# Patient Record
Sex: MALE | Race: WHITE | Employment: FULL TIME | URBAN - METROPOLITAN AREA
[De-identification: names, ages, dates, MRNs, and addresses within clinical notes are randomized per-mention and may not be internally consistent; named-entity substitution may affect disease eponyms.]

---

## 2020-10-07 ENCOUNTER — TELEPHONE (OUTPATIENT)
Dept: FAMILY MEDICINE CLINIC | Age: 45
End: 2020-10-07

## 2020-10-07 ENCOUNTER — OFFICE VISIT (OUTPATIENT)
Dept: FAMILY MEDICINE CLINIC | Age: 45
End: 2020-10-07
Payer: OTHER GOVERNMENT

## 2020-10-07 VITALS
WEIGHT: 163 LBS | DIASTOLIC BLOOD PRESSURE: 94 MMHG | SYSTOLIC BLOOD PRESSURE: 138 MMHG | HEART RATE: 76 BPM | TEMPERATURE: 97.5 F | BODY MASS INDEX: 24.71 KG/M2 | OXYGEN SATURATION: 99 % | HEIGHT: 68 IN

## 2020-10-07 PROBLEM — F41.9 ANXIETY: Status: ACTIVE | Noted: 2020-10-07

## 2020-10-07 PROBLEM — F90.9 ATTENTION DEFICIT HYPERACTIVITY DISORDER (ADHD): Status: ACTIVE | Noted: 2020-10-07

## 2020-10-07 PROBLEM — F43.10 PTSD (POST-TRAUMATIC STRESS DISORDER): Status: ACTIVE | Noted: 2020-10-07

## 2020-10-07 PROCEDURE — 90471 IMMUNIZATION ADMIN: CPT | Performed by: NURSE PRACTITIONER

## 2020-10-07 PROCEDURE — 99203 OFFICE O/P NEW LOW 30 MIN: CPT | Performed by: NURSE PRACTITIONER

## 2020-10-07 PROCEDURE — 90686 IIV4 VACC NO PRSV 0.5 ML IM: CPT | Performed by: NURSE PRACTITIONER

## 2020-10-07 RX ORDER — BUPROPION HYDROCHLORIDE 150 MG/1
150 TABLET ORAL EVERY MORNING
Qty: 30 TABLET | Refills: 5 | Status: SHIPPED | OUTPATIENT
Start: 2020-10-07 | End: 2020-12-10

## 2020-10-07 ASSESSMENT — PATIENT HEALTH QUESTIONNAIRE - PHQ9
1. LITTLE INTEREST OR PLEASURE IN DOING THINGS: 0
SUM OF ALL RESPONSES TO PHQ QUESTIONS 1-9: 2
SUM OF ALL RESPONSES TO PHQ9 QUESTIONS 1 & 2: 2
SUM OF ALL RESPONSES TO PHQ QUESTIONS 1-9: 2
2. FEELING DOWN, DEPRESSED OR HOPELESS: 2

## 2020-10-07 ASSESSMENT — ENCOUNTER SYMPTOMS
STRIDOR: 0
COUGH: 0
SHORTNESS OF BREATH: 0
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
## 2020-10-07 NOTE — TELEPHONE ENCOUNTER
Patient states his referral to psychiatry needs to go through [facility]. Patient had his  ID only with him today. His member Id is on the back of card.

## 2020-10-07 NOTE — PATIENT INSTRUCTIONS
Start Wellbutrin daily   Make appointment with [facility]  Follow up in 8 weeks   Can schedule physical for fasting blood work  BP mildly elevated monitor salt intake    Patient Education   bupropion  Pronunciation:  byoo PRO pee on  Brand:  Aplenzin, Forfivo XL, Wellbutrin SR, Wellbutrin XL, Zyban Advantage Pack  What is the most important information I should know about bupropion? You should not take bupropion if you have seizures or an eating disorder, or if you have suddenly stopped using alcohol, seizure medication, or sedatives. If you take Wellbutrin for depression, do not also take Zyban to quit smoking. Do not use bupropion within 14 days before or 14 days after you have used an MAO inhibitor, such as isocarboxazid, linezolid, methylene blue injection, phenelzine, rasagiline, selegiline, or tranylcypromine. Some young people have thoughts about suicide when first taking an antidepressant. Stay alert to changes in your mood or symptoms. Report any new or worsening symptoms to your doctor. What is bupropion? Bupropion is an antidepressant used to treat major depressive disorder and seasonal affective disorder. The Zyban brand of bupropion is used to help people stop smoking by reducing cravings and other withdrawal effects. Bupropion may also be used for purposes not listed in this medication guide. What should I discuss with my healthcare provider before taking bupropion? You should not take bupropion if you are allergic to it, or if you have:  · a seizure disorder;  · an eating disorder such as anorexia or bulimia; or  · if you have suddenly stopped using alcohol, seizure medication, or a sedative (such as Xanax, Valium, Fiorinal, Klonopin, and others). Do not use an MAO inhibitor within 14 days before or 14 days after you take bupropion. A dangerous drug interaction could occur. MAO inhibitors include isocarboxazid, linezolid, phenelzine, rasagiline, selegiline, and tranylcypromine.   Do not take bupropion to treat more than one condition at a time. If you take bupropion for depression, do not also take this medicine to quit smoking. Bupropion may cause seizures, especially if you have certain medical conditions or use certain drugs. Tell your doctor about all of your medical conditions and the drugs you use. Tell your doctor if you have ever had:  · a head injury, seizures, or brain or spinal cord tumor;  · narrow-angle glaucoma;  · heart disease, high blood pressure, or a heart attack;  · diabetes;  · kidney or liver disease (especially cirrhosis);  · depression, bipolar disorder, or other mental illness; or  · if you drink alcohol. Some young people have thoughts about suicide when first taking an antidepressant. Your doctor will need to check your progress at regular visits. Your family or other caregivers should also be alert to changes in your mood or symptoms. Ask your doctor about taking this medicine if you are pregnant. It is not known whether bupropion will harm an unborn baby. However, you may have a relapse of depression if you stop taking your antidepressant. Tell your doctor right away if you become pregnant. Do not start or stop taking bupropion without your doctor's advice. If you are pregnant, your name may be listed on a pregnancy registry to track the effects of bupropion on the baby. It may not be safe to breastfeed while using this medicine. Ask your doctor about any risk. Bupropion is not approved for use by anyone younger than 25years old. How should I take bupropion? Follow all directions on your prescription label and read all medication guides or instruction sheets. Your doctor may occasionally change your dose. Use the medicine exactly as directed. Too much of this medicine can increase your risk of a seizure. You may take bupropion with or without food. Swallow the extended-release tablet whole and do not crush, chew, or break it.   You should not change your dose or stop using bupropion suddenly, unless you have a seizure while taking this medicine. Stopping suddenly can cause unpleasant withdrawal symptoms. Ask your doctor how to safely stop using bupropion. If you take Zyban to help you stop smoking, you may continue to smoke for about 1 week after you start the medicine. Set a date to quit smoking during the first 2 weeks of treatment. Talk to your doctor if you have trouble quitting after taking Zyban for 7 to 12 weeks. Your doctor may prescribe a nicotine replacement product (such as patches or gum) to help you stop smoking. Start using the nicotine replacement product on the same day you stop (quit) smoking or using tobacco products. Some people taking bupropion (Wellbutrin or Zyban) have had high blood pressure that is severe, especially when also using a nicotine replacement product (patch or gum). Your blood pressure may need to be checked before and during treatment with bupropion. Read and carefully follow any Instructions for Use provided with your medicine. Ask your doctor or pharmacist if you do not understand these instructions. You may have nicotine withdrawal symptoms when you stop smoking, including: increased appetite, weight gain, trouble sleeping, trouble concentrating, slower heart rate, having the urge to smoke, and feeling anxious, restless, depressed, angry, frustrated, or irritated. These symptoms may occur with or without using medication such as Zyban. Smoking cessation may also cause new or worsening mental health problems, such as depression. This medicine may affect a drug-screening urine test and you may have false results. Tell the laboratory staff that you use bupropion. Store at room temperature away from moisture, heat, and light. What happens if I miss a dose? Skip the missed dose and use your next dose at the regular time. Do not use two doses at one time. What happens if I overdose?   Seek emergency medical attention or call the Poison Help line at 1-306.417.9927. An overdose of bupropion can be fatal.  Overdose symptoms may include muscle stiffness, hallucinations, fast or uneven heartbeat, shallow breathing, or fainting. What should I avoid while taking bupropion? Drinking alcohol with bupropion may increase your risk of seizures. If you drink alcohol regularly, talk with your doctor before changing the amount you drink. Bupropion can also cause seizures in a regular drinker who suddenly stops drinking at the start of treatment with bupropion. Avoid driving or hazardous activity until you know how this medicine will affect you. Your reactions could be impaired. What are the possible side effects of bupropion? Get emergency medical help if you have signs of an allergic reaction (hives, itching, fever, swollen glands, difficult breathing, swelling in your face or throat) or a severe skin reaction (fever, sore throat, burning eyes, skin pain, red or purple skin rash with blistering and peeling). Report any new or worsening symptoms to your doctor, such as: mood or behavior changes, anxiety, depression, panic attacks, trouble sleeping, or if you feel impulsive, irritable, agitated, hostile, aggressive, restless, hyperactive (mentally or physically), more depressed, or have thoughts about suicide or hurting yourself. Call your doctor at once if you have:  · a seizure (convulsions);  · confusion, unusual changes in mood or behavior;  · blurred vision, tunnel vision, eye pain or swelling, or seeing halos around lights;  · fast or irregular heartbeats; or  · a manic episode --racing thoughts, increased energy, reckless behavior, feeling extremely happy or irritable, talking more than usual, severe problems with sleep.   Common side effects may include:  · dry mouth, sore throat, stuffy nose;  · ringing in the ears;  · blurred vision;  · nausea, vomiting, stomach pain, loss of appetite, constipation;  · sleep problems (insomnia);  · tremors, sweating, feeling anxious or nervous;  · fast heartbeats;  · confusion, agitation, hostility;  · rash;  · weight loss;  · increased urination;  · headache, dizziness; or  · muscle or joint pain. This is not a complete list of side effects and others may occur. Call your doctor for medical advice about side effects. You may report side effects to FDA at 1-988-GSI-7520. What other drugs will affect bupropion? You may have a higher risk of seizures if you use certain other medicines while taking bupropion. Many drugs can affect bupropion. This includes prescription and over-the-counter medicines, vitamins, and herbal products. Not all possible interactions are listed here. Tell your doctor about all your current medicines and any medicine you start or stop using. Where can I get more information? Your pharmacist can provide more information about bupropion. Remember, keep this and all other medicines out of the reach of children, never share your medicines with others, and use this medication only for the indication prescribed. Every effort has been made to ensure that the information provided by [address] is accurate, up-to-date, and complete, but no guarantee is made to that effect. Drug information contained herein may be time sensitive. [facility] information has been compiled for use by healthcare practitioners and consumers in the [place] and therefore [employer] does not warrant that uses outside of the [place] are appropriate, unless specifically indicated otherwise. [facility]'s drug information does not endorse drugs, diagnose patients or recommend therapy.  [facility] drug information is an informational resource designed to assist licensed healthcare practitioners in caring for their patients and/or to serve consumers viewing this service as a supplement to, and not a substitute for, the expertise, skill, knowledge and judgment of healthcare practitioners. The absence of a warning for a given drug or drug combination in no way should be construed to indicate that the drug or drug combination is safe, effective or appropriate for any given patient. [employer] does not assume any responsibility for any aspect of healthcare administered with the aid of information [employer] provides. The information contained herein is not intended to cover all possible uses, directions, precautions, warnings, drug interactions, allergic reactions, or adverse effects. If you have questions about the drugs you are taking, check with your doctor, nurse or pharmacist.  Copyright 6892-0234 [address]: 24.01. Revision date: 1/27/2020. Care instructions adapted under license by [facility]). If you have questions about a medical condition or this instruction, always ask your healthcare professional. [place] [address] any warranty or liability for your use of this information. Patient Education   DASH Diet: Care Instructions  Your Care Instructions     The DASH diet is an eating plan that can help lower your blood pressure. DASH stands for Dietary Approaches to Stop Hypertension. Hypertension is high blood pressure. The DASH diet focuses on eating foods that are high in calcium, potassium, and magnesium. These nutrients can lower blood pressure. The foods that are highest in these nutrients are fruits, vegetables, low-fat dairy products, nuts, seeds, and legumes. But taking calcium, potassium, and magnesium supplements instead of eating foods that are high in those nutrients does not have the same effect. The DASH diet also includes whole grains, fish, and poultry. The DASH diet is one of several lifestyle changes your doctor may recommend to lower your high blood pressure. Your doctor may also want you to decrease the amount of sodium in your diet.  Lowering sodium while following the DASH diet can lower blood pressure even further than just the DASH diet alone. Follow-up care is a key part of your treatment and safety. Be sure to make and go to all appointments, and call your doctor if you are having problems. It's also a good idea to know your test results and keep a list of the medicines you take. How can you care for yourself at home? Following the DASH diet  · Eat 4 to 5 servings of fruit each day. A serving is 1 medium-sized piece of fruit, ½ cup chopped or canned fruit, 1/4 cup dried fruit, or 4 ounces (½ cup) of fruit juice. Choose fruit more often than fruit juice. · Eat 4 to 5 servings of vegetables each day. A serving is 1 cup of lettuce or raw leafy vegetables, ½ cup of chopped or cooked vegetables, or 4 ounces (½ cup) of vegetable juice. Choose vegetables more often than vegetable juice. · Get 2 to 3 servings of low-fat and fat-free dairy each day. A serving is 8 ounces of milk, 1 cup of yogurt, or 1 ½ ounces of cheese. · Eat 6 to 8 servings of grains each day. A serving is 1 slice of bread, 1 ounce of dry cereal, or ½ cup of cooked rice, pasta, or cooked cereal. Try to choose whole-grain products as much as possible. · Limit lean meat, poultry, and fish to 2 servings each day. A serving is 3 ounces, about the size of a deck of cards. · Eat 4 to 5 servings of nuts, seeds, and legumes (cooked dried beans, lentils, and split peas) each week. A serving is 1/3 cup of nuts, 2 tablespoons of seeds, or ½ cup of cooked beans or peas. · Limit fats and oils to 2 to 3 servings each day. A serving is 1 teaspoon of vegetable oil or 2 tablespoons of salad dressing. · Limit sweets and added sugars to 5 servings or less a week. A serving is 1 tablespoon jelly or jam, ½ cup sorbet, or 1 cup of lemonade. · Eat less than 2,300 milligrams (mg) of sodium a day. If you limit your sodium to 1,500 mg a day, you can lower your blood pressure even more. Tips for success  · Start small. Do not try to make dramatic changes to your diet all at once.  You

## 2020-10-07 NOTE — PROGRESS NOTES
Vaccine Information Sheet, \"Influenza - Inactivated\"  given to Kameron Joyce, or parent/legal guardian of  Kameron Joyce and verbalized understanding. Patient responses:    Have you ever had a reaction to a flu vaccine? No  Do you have any current illness? No  Have you ever had Guillian Delanson Syndrome? No  Do you have a serious allergy to any of the follow: Neomycin, Polymyxin, Thimerosal, eggs or egg products? No    Flu vaccine given per order. Please see immunization tab. Risks and benefits explained. Current VIS given.

## 2020-10-14 NOTE — TELEPHONE ENCOUNTER
The referral was approved. The pt was called and the referral information was sent to the p.  The referral and the the documentation was sent to the spesicalist

## 2020-12-09 ENCOUNTER — TELEPHONE (OUTPATIENT)
Dept: FAMILY MEDICINE CLINIC | Age: 45
End: 2020-12-09

## 2020-12-09 NOTE — TELEPHONE ENCOUNTER
----- Message from [facility] sent at 12/9/2020  4:15 PM EST -----  Subject: Appointment Request    Reason for Call: Routine Existing Condition Follow Up    QUESTIONS  Type of Appointment? Established Patient  Reason for appointment request? No appointments available during search  Additional Information for Provider? Patient is requesting a virtual apt   since he has tested positive for COVID on Dec 6 and has cancelled his in   person apt on 12/10 with Carlos Maurice. Please call [phone].  ---------------------------------------------------------------------------  --------------  Darl Boom INFO  What is the best way for the office to contact you? OK to leave message on   voicemail  Preferred Call Back Phone Number? [phone]  ---------------------------------------------------------------------------  --------------  SCRIPT ANSWERS  Relationship to Patient? Self  Appointment reason? Well Care/Follow Ups  Select a Well Care/Follow Ups appointment reason? Adult Existing Condition   Follow Up [Diabetes   CHF   COPD   Hypertension/Blood Pressure Check]  (Is the patient requesting to be seen urgently for their symptoms?)? No  Is this follow up request related to routine Diabetes Management? No  Are you having any new concerns about your existing condition? No  Have you been diagnosed with   tested for   or told that you are suspected of having COVID-19 (Coronavirus)? Yes  Did your symptoms begin or have you been tested for COVID-19 in the last   10 days?  Yes

## 2020-12-10 ENCOUNTER — VIRTUAL VISIT (OUTPATIENT)
Dept: FAMILY MEDICINE CLINIC | Age: 45
End: 2020-12-10
Payer: OTHER GOVERNMENT

## 2020-12-10 PROCEDURE — 99213 OFFICE O/P EST LOW 20 MIN: CPT | Performed by: NURSE PRACTITIONER

## 2020-12-10 ASSESSMENT — ENCOUNTER SYMPTOMS
COUGH: 1
STRIDOR: 0
SHORTNESS OF BREATH: 0
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
## 2021-03-16 ENCOUNTER — TELEPHONE (OUTPATIENT)
Dept: FAMILY MEDICINE CLINIC | Age: 46
End: 2021-03-16

## 2021-03-16 NOTE — TELEPHONE ENCOUNTER
----- Message from Gaudencio White sent at 3/15/2021  4:18 PM EDT -----  Subject: Appointment Request    Reason for Call: New Patient Request Appointment    QUESTIONS  Type of Appointment? New Patient/New to Provider  Reason for appointment request? Requested Provider unavailable - Alexys Rodriguez  Additional Information for Provider? Pt's Gf   Cassie Hahn   called in to establish care for PT with provider Maria Isabel Sanchez. Provider   told Ms. Best that she would take him as a new PT. Screened green. Please   call him back to schedule.  ---------------------------------------------------------------------------  --------------  CALL BACK INFO  What is the best way for the office to contact you? OK to leave message on   voicemail  Preferred Call Back Phone Number? [phone]  ---------------------------------------------------------------------------  --------------  SCRIPT ANSWERS  Relationship to Patient? Other  Representative Name? Cassie Hahn  Additional information verified (besides Name and Date of Birth)? Address  Appointment reason? Establish Care/Find a provider  Is this the first appointment to establish care for a ? No  Have you been diagnosed with   tested for   or told that you are suspected of having COVID-19 (Coronavirus)? Yes  Did your symptoms begin or have you been tested for COVID-19 in the last   10 days? No  Have you had a fever or taken medication to treat a fever within the past   3 days? No  Have you had a cough   shortness of breath or flu-like symptoms within the past 3 days? No  Do you currently have flu-like symptoms including fever or chills   cough   shortness of breath   or difficulty breathing   or new loss of taste or smell? No  (Service Expert  click yes below to proceed with RAP Index As Usual   Scheduling)?  Yes

## 2021-03-24 NOTE — TELEPHONE ENCOUNTER
Pt was scheduled using a different chart. [facility] made this chart when patient already had a chart. [facility] also put in the wrong birthday for pt in this chart.  His other chart is   Layla Reyes : [birth year]

## 2021-03-29 ENCOUNTER — OFFICE VISIT (OUTPATIENT)
Dept: FAMILY MEDICINE CLINIC | Age: 46
End: 2021-03-29
Payer: OTHER GOVERNMENT

## 2021-03-29 VITALS
WEIGHT: 167 LBS | BODY MASS INDEX: 25.31 KG/M2 | TEMPERATURE: 97.3 F | HEIGHT: 68 IN | OXYGEN SATURATION: 98 % | SYSTOLIC BLOOD PRESSURE: 130 MMHG | DIASTOLIC BLOOD PRESSURE: 86 MMHG | HEART RATE: 98 BPM

## 2021-03-29 DIAGNOSIS — R41.840 CONCENTRATION DEFICIT: Primary | ICD-10-CM

## 2021-03-29 PROCEDURE — 99213 OFFICE O/P EST LOW 20 MIN: CPT | Performed by: NURSE PRACTITIONER

## 2021-03-29 ASSESSMENT — ENCOUNTER SYMPTOMS
SHORTNESS OF BREATH: 0
WHEEZING: 0

## 2021-03-29 NOTE — PROGRESS NOTES
Patient: Araceli Olmos is a 55 y.o. male who presents today with the following Chief Complaint(s):  Chief Complaint   Patient presents with    Establish Care     discuss add, [facility] and [facility] since covid started          HPI   Here to establish care  Wants to discuss attention deficit- was going to be tested at the [facility] but then COVID happened and they weren't seeing anyone except emergencies. Was seeing Jordan Christopher and tried wellbutrin but it did not help him focus at all. Did not like how he felt on it. He states his daughter is getting tested [facility]  For ADD/ADHD  Retired from [employer] last year     No other c/o    No current outpatient medications on file. No current facility-administered medications for this visit. Patient's past medical history, surgical history, family history, medications,  andallergies  were all reviewed and updated as appropriate today. Review of Systems   Constitutional: Negative for chills and fever. Respiratory: Negative for shortness of breath and wheezing. Cardiovascular: Negative for chest pain and palpitations. Neurological: Negative for dizziness and headaches. Physical Exam  Vitals signs and nursing note reviewed. Constitutional:       Appearance: Normal appearance. He is well-developed. HENT:      Head: Normocephalic and atraumatic. Right Ear: External ear normal.      Left Ear: External ear normal.      Nose: Nose normal.      Mouth/Throat:      Pharynx: No oropharyngeal exudate or posterior oropharyngeal erythema. Eyes:      Conjunctiva/sclera: Conjunctivae normal.   Neck:      Musculoskeletal: Normal range of motion. Cardiovascular:      Rate and Rhythm: Normal rate and regular rhythm. Heart sounds: Normal heart sounds. No murmur. Pulmonary:      Effort: Pulmonary effort is normal. No respiratory distress. Breath sounds: Normal breath sounds. No wheezing or rales.    Musculoskeletal: Normal range of motion. Lymphadenopathy:      Cervical: No cervical adenopathy. Skin:     General: Skin is warm and dry. Neurological:      General: No focal deficit present. Mental Status: He is alert and oriented to person, place, and time. Deep Tendon Reflexes: Reflexes are normal and symmetric. Psychiatric:         Mood and Affect: Mood normal.         Behavior: Behavior normal.         Thought Content: Thought content normal.         Judgment: Judgment normal.       Vitals:    03/29/21 1732   BP: 130/86   Pulse:    Temp:    SpO2:        Assessment:  Encounter Diagnosis   Name Primary?  Concentration deficit Yes       Plan:  1. Concentration deficit  Will make appt with Ilsa for eval of ADD/ADHD          No follow-ups on file.

## 2021-04-20 ENCOUNTER — VIRTUAL VISIT (OUTPATIENT)
Dept: PSYCHOLOGY | Age: 46
End: 2021-04-20
Payer: OTHER GOVERNMENT

## 2021-04-20 DIAGNOSIS — R41.840 POOR CONCENTRATION: Primary | ICD-10-CM

## 2021-04-20 PROCEDURE — 90791 PSYCH DIAGNOSTIC EVALUATION: CPT | Performed by: PSYCHOLOGIST

## 2021-04-20 NOTE — PROGRESS NOTES
Behavioral Health Consultation  Jatin Muro M.A. Psychology Assistant  Kristofer Medrano, Ph.D. Supervising Psychologist  4/20/2021  3:08 PM EDT      Time spent with Patient: 25 minutes  [facility]. Reason for Consult:    Chief Complaint   Patient presents with   [facility] Other     ADHD symptoms     Referring Provider: Brendon Quintero, APRN - CNP  [address] [facility],  [address] [place] [address]    Pt provided informed consent for the behavioral health program. Discussed with patient model of service to include the limits of confidentiality (i.e. abuse reporting, suicide intervention, etc.) and short-term intervention focused approach. Pt indicated understanding. Feedback given to PCP. TELEHEALTH VISIT -- Audio/Visual (During Critical access hospitalYV-23 public health emergency)  }  Pursuant to the emergency declaration under the [address] [place] [address], [facility] waiver authority and the [employer] Act, this Virtual Visit was conducted, with patient's consent, to reduce the patient's risk of exposure to COVID-19 and provide continuity of care for an established patient. Services were provided through a video synchronous discussion virtually to substitute for in-person clinic visit. Pt gave verbal informed consent to participate in telehealth services. Conducted a risk-benefit analysis and determined that the patient's presenting problems are consistent with the use of telepsychology. Determined that the patient has sufficient knowledge and skills in the use of technology enabling them to adequately benefit from telepsychology. It was determined that this patient was able to be properly treated without an in-person session. Patient verified that they were currently located at the [facility] address that was provided during registration.     Verified the following information:  Patient's identification: Yes  [facility]  Patient's call back number: [phone]   Patient's emergency contact's name and number, as well as permission to contact them if needed: Extended Emergency Contact Information  Primary Emergency Contact: Korey Roach  Home Phone: [phone]  Relation: Domestic Partner   needed? No  Secondary Emergency Contact: alicia resendiz  Home Phone: [phone]  Relation: Other  Preferred language: English     Provider location: [facility]:  Patient presents for ADHD evaluation. Has trouble finishing projects (e.g., basement). Pt reported fidgeting and interruptting people a lot. Symptoms have been going on for most of my life. Goals for treatment incude having better quality of life. Patient lives with partner and up to 5 children. Patient works as an . Daily routine is inconsistent. Daily caffeine use consists of one medium [employer] coffee. No cigarette use. Pt has one glass of wine twice a month. No illegal drug use. Patient spends little time per day on social media or watching TV. Pt exercises 4 days per week for 20-40 min. Patient described having poor sleep quality (has been difficult for 15 years). Avg. Amount of sleep per night is 5 hours (could be broken up too). Racing thoughts in bed. Patient enjoys the following hobbies: running. Patient describes good social support. Pt does not identify with any spiritual or Methodist beliefs (reported that he tries to be kind). Family history is positive for ADHD.     O:  MSE:    Appearance: good hygiene   Attitude: cooperative and friendly  Consciousness: alert  Orientation: oriented to person, place, time, general circumstance  Memory: recent and remote memory intact  Attention/Concentration: intact during session  Psychomotor Activity:normal  Eye Contact: normal  Speech: normal rate and volume, well-articulated  Mood: neutral  Affect: congruent  Perception: within normal limits  Thought Content: within Family History:   Family History   Problem Relation Age of Onset    High Blood Pressure Mother     High Cholesterol Mother     Breast Cancer Mother     Alcohol Abuse Mother     High Blood Pressure Father     High Cholesterol Father     Cancer Paternal Aunt     Heart Attack Paternal Uncle     Cancer Maternal Grandmother         breast     Heart Attack Maternal Grandfather     Cancer Paternal Grandmother         \"female region\"    Heart Attack Paternal Grandfather        A:    Pt is being assessed for ADHD sx. Pt denied SI and harm ideation. Diagnosis:    1. Poor concentration      No past medical history on file.   Plan:  Pt interventions:  Established rapport, Conducted functional assessment, [place]-setting to identify pt's primary goals for [facility] visit / overall health and Supportive techniques    Pt Behavioral Change Plan:   See Pt Instructions

## 2021-05-07 ENCOUNTER — VIRTUAL VISIT (OUTPATIENT)
Dept: PSYCHOLOGY | Age: 46
End: 2021-05-07
Payer: OTHER GOVERNMENT

## 2021-05-07 DIAGNOSIS — R41.840 POOR CONCENTRATION: Primary | ICD-10-CM

## 2021-05-07 PROCEDURE — 90832 PSYTX W PT 30 MINUTES: CPT | Performed by: PSYCHOLOGIST

## 2021-05-07 NOTE — PROGRESS NOTES
Relation: Other  Preferred language: English     Provider location: [facility]:    Pt reported that as a child he got in trouble in school for not staying seated, not finishing assignments, and being dissruptive. Pt reported that he received Bs and Cs before college. Pt reported getting Ds and Fs in college. Pt did eventually earn two bachelor's degrees and an SAMSON. Pt reported the following inattentive ADHD sx: Overlooking details (since childhood), appearing not to listen (since childhood), task inattention, and difficulty organizing tasks. Pt reported the following hyperactivity/impulsivity sx: Fidgeting (present in childhood), leaving seats (present since childhood, pt has averaged 30k steps per day, 900k steps per month), running or climbing when inappropriate (present since childhood), unable to remain quiet (since childhood), talking excessively (present since childhood), and interrupting people (as a child). Pt reported that he is interested in both medication and behavior medicine interventions. Pt reported that he was previously on Wellbutrin (did not help). O:  MSE:    Appearance: good hygiene   Attitude: cooperative and friendly  Consciousness: alert  Orientation: oriented to person, place, time, general circumstance  Memory: recent and remote memory intact  Attention/Concentration: variable  Psychomotor Activity:normal  Eye Contact: normal  Speech: normal rate and volume, well-articulated  Mood: neutral  Affect: congruent  Perception: within normal limits  Thought Content: within normal limits  Thought Process: logical, coherent and goal-directed  Insight: fair  Judgment: intact  Ability to understand instructions: Yes  Ability to respond meaningfully: Yes  Morbid Ideation: no   Suicide Assessment: no suicidal ideation, plan, or intent  Homicidal Ideation: no    History:    Medications:   No current outpatient medications on file.      No current facility-administered medications for this visit. Social History:   Social History     Socioeconomic History    Marital status: Single     Spouse name: Not on file    Number of children: Not on file    Years of education: Not on file    Highest education level: Master's degree (e.g., MA, MS, Alan, MEd, MSW, SAMSON)   Occupational History    Not on file   Social Needs    Financial resource strain: Not on file    Food insecurity     Worry: Not on file     Inability: Not on file   [employer] needs     Medical: Not on file     Non-medical: Not on file   Tobacco Use    Smoking status: Never Smoker    Smokeless tobacco: Never Used   Substance and Sexual Activity    Alcohol use: Not Currently    Drug use: Never    Sexual activity: Yes   Lifestyle    Physical activity     Days per week: Not on file     Minutes per session: Not on file    Stress: Not on file   Relationships    Social connections     Talks on phone: Not on file     Gets together: Not on file     Attends Jainism service: Not on file     Active member of club or organization: Not on file     Attends meetings of clubs or organizations: Not on file     Relationship status: Not on file    Intimate partner violence     Fear of current or ex partner: Not on file     Emotionally abused: Not on file     Physically abused: Not on file     Forced sexual activity: Not on file   Other Topics Concern    Not on file   Social History Narrative    Not on file     TOBACCO:   reports that he has never smoked. He has never used smokeless tobacco.  ETOH:   reports previous alcohol use.   Family History:   Family History   Problem Relation Age of Onset    High Blood Pressure Mother     High Cholesterol Mother     Breast Cancer Mother     Alcohol Abuse Mother     High Blood Pressure Father     High Cholesterol Father     Cancer Paternal Aunt     Heart Attack Paternal Uncle     Cancer Maternal Grandmother         breast     Heart Attack Maternal Grandfather     Cancer Paternal Grandmother         \"female region\"    Heart Attack Paternal Grandfather        A:    Pt likely has ADHD but further assessment is needed to determine if it is the hyperactivity/impulsive type or combined type. Diagnosis:    1. Poor concentration      No past medical history on file.   Plan:  Pt interventions:  Conducted functional assessment    Pt Behavioral Change Plan:   See Pt Instructions

## 2021-05-21 ENCOUNTER — VIRTUAL VISIT (OUTPATIENT)
Dept: PSYCHOLOGY | Age: 46
End: 2021-05-21

## 2021-05-21 DIAGNOSIS — F90.2 ATTENTION DEFICIT HYPERACTIVITY DISORDER (ADHD), COMBINED TYPE: Primary | ICD-10-CM

## 2021-05-21 PROCEDURE — 99999 PR OFFICE/OUTPT VISIT,PROCEDURE ONLY: CPT | Performed by: PSYCHOLOGIST

## 2021-05-21 NOTE — PROGRESS NOTES
Behavioral Health Consultation  Ramirez Liao M.A. Psychology Assistant  Gucci Gannon, Ph.D. Supervising Psychologist  5/21/2021  11:45 AM EDT      Time spent with Patient: 15 minutes  This is patient's third [facility] appointment. Reason for Consult:    Chief Complaint   Patient presents with    Other     ADHD symptoms     Feedback given to PCP. TELEHEALTH VISIT -- Audio/Visual (During QBGUQ-40 public health emergency)  }  Pursuant to the emergency declaration under the [address] [place] [address] waiver authority and the [employer] Act, this Virtual Visit was conducted, with patient's consent, to reduce the patient's risk of exposure to COVID-19 and provide continuity of care for an established patient. Services were provided through a video synchronous discussion virtually to substitute for in-person clinic visit. Pt gave verbal informed consent to participate in telehealth services. Conducted a risk-benefit analysis and determined that the patient's presenting problems are consistent with the use of telepsychology. Determined that the patient has sufficient knowledge and skills in the use of technology enabling them to adequately benefit from telepsychology. It was determined that this patient was able to be properly treated without an in-person session. Patient verified that they were currently located at the [facility] address that was provided during registration. Verified the following information:  Patient's identification: Yes  Patient location: [address] [place] [id number]   Patient's call back number: [phone]   Patient's emergency contact's name and number, as well as permission to contact them if needed: Extended Emergency Contact Information  Primary Emergency Contact: Alicia Rowell  Home Phone: [phone]  Relation: Domestic Partner   needed?  No  Secondary Emergency Contact: alicia resendiz  Home Phone: [phone]  Relation: Other  Preferred language: English     [facility] [address] [facility]     S:    Pt completed the rest of the diagnostic interview for ADHD. In addition to sx previously reported (see previous notes), pt reported the following Inattentions Sx: Fails to finish tasks, Often loses things necessary for tasks, Easily distracted, and Often forgetful. In addition to sx previously reported (see previous notes), pt reported the following Hyperactivity/impulsivity: Hyperactivity, Blurts answers, and Struggles to take turns. [facility] shared diagnosis with pt. Pt reported that it felt good to know about the diagnosis. Pt was referred to PCP for medication, and to Dr. Vernell Hopkins [facility] for behavioral health interventions. O:  MSE:    Appearance: good hygiene   Attitude: cooperative and friendly  Consciousness: alert  Orientation: oriented to person, place, time, general circumstance  Memory: recent and remote memory intact  Attention/Concentration: intact during session  Psychomotor Activity:normal  Eye Contact: normal  Speech: normal rate and volume, well-articulated  Mood: neutral  Affect: congruent  Perception: within normal limits  Thought Content: within normal limits  Thought Process: logical, coherent and goal-directed  Insight: good  Judgment: intact  Ability to understand instructions: Yes  Ability to respond meaningfully: Yes  Morbid Ideation: no   Suicide Assessment: no suicidal ideation, plan, or intent  Homicidal Ideation: no    History:    Medications:   No current outpatient medications on file. No current facility-administered medications for this visit.      Social History:   Social History     Socioeconomic History    Marital status: Single     Spouse name: Not on file    Number of children: Not on file    Years of education: Not on file    Highest education level: Master's degree (e.g., MA, MS, Alan, MEd, MSW, SAMSON)   Occupational History    Not on file Tobacco Use    Smoking status: Never Smoker    Smokeless tobacco: Never Used   Vaping Use    Vaping Use: Never used   Substance and Sexual Activity    Alcohol use: Not Currently    Drug use: Never    Sexual activity: Yes   Other Topics Concern    Not on file   Social History Narrative    Not on file     Social Determinants of Health     Financial Resource Strain:     Difficulty of Paying Living Expenses:    Food Insecurity:     Worried About Running Out of Food in the Last Year:     [address] in the Last Year:    Transportation Needs:     Lack of Transportation (Medical):  Lack of Transportation (Non-Medical):    Physical Activity:     Days of Exercise per Week:     Minutes of Exercise per Session:    Stress:     Feeling of Stress :    Social Connections:     Frequency of Communication with Friends and Family:     Frequency of Social Gatherings with Friends and Family:     Attends Taoism Services:     Active Member of Clubs or Organizations:     Attends Club or Organization Meetings:     Marital Status:    Intimate Partner Violence:     Fear of Current or Ex-Partner:     Emotionally Abused:     Physically Abused:     Sexually Abused:      TOBACCO:   reports that he has never smoked. He has never used smokeless tobacco.  ETOH:   reports previous alcohol use. Family History:   Family History   Problem Relation Age of Onset    High Blood Pressure Mother     High Cholesterol Mother     Breast Cancer Mother     Alcohol Abuse Mother     High Blood Pressure Father     High Cholesterol Father     Cancer Paternal Aunt     Heart Attack Paternal Uncle     Cancer Maternal Grandmother         breast     Heart Attack Maternal Grandfather     Cancer Paternal Grandmother         \"female region\"    Heart Attack Paternal Grandfather        A:    Pt meets criteria for ADHD, combined type diagnosis. Pt will benefit from medication and behavioral intervention. Diagnosis:    1. Attention deficit hyperactivity disorder (ADHD), combined type      No past medical history on file.   Plan:  Pt interventions:  Conducted functional assessment    Pt Behavioral Change Plan:   See Pt Instructions

## 2021-06-04 ENCOUNTER — VIRTUAL VISIT (OUTPATIENT)
Dept: PSYCHOLOGY | Age: 46
End: 2021-06-04
Payer: OTHER GOVERNMENT

## 2021-06-04 DIAGNOSIS — F43.10 PTSD (POST-TRAUMATIC STRESS DISORDER): ICD-10-CM

## 2021-06-04 DIAGNOSIS — F90.9 ATTENTION DEFICIT HYPERACTIVITY DISORDER (ADHD), UNSPECIFIED ADHD TYPE: Primary | ICD-10-CM

## 2021-06-04 PROCEDURE — 90832 PSYTX W PT 30 MINUTES: CPT | Performed by: PSYCHOLOGIST

## 2021-06-04 NOTE — PROGRESS NOTES
alicia resendiz  Home Phone: [phone]  Relation: Other  Preferred language: English     Provider location: [place], [address] [facility] [address]:  Discussed patient's goals for treatment: improve task orientation, focus, reduce stress. Patient has difficulty turning off his brain after work. Notes that his wife has discussed his tendency to interrupt, be distracted, pick at his fingers, start things without finishing them. 10year-old daughter has been recommended for an ADHD evaluation. Patient has been dx with PTSD through the [place], and may choose to seek treatment. Discussed time management strategies. O:  MSE:    Appearance: good hygiene   Attitude: cooperative and friendly  Consciousness: alert  Orientation: oriented to person, place, time, general circumstance  Memory: recent and remote memory intact  Attention/Concentration: intact during session  Psychomotor Activity:normal  Eye Contact: normal  Speech: normal rate and volume, well-articulated  Mood: stressed  Affect: anxious  Perception: within normal limits  Thought Content: intrusive thoughts  Thought Process: logical, coherent and goal-directed  Insight: good  Judgment: intact  Ability to understand instructions: Yes  Ability to respond meaningfully: Yes  Morbid Ideation: no   Suicide Assessment: no suicidal ideation, plan, or intent  Homicidal Ideation: no    History:    Medications:   No current outpatient medications on file. No current facility-administered medications for this visit.      Social History:   Social History     Socioeconomic History    Marital status: Single     Spouse name: Not on file    Number of children: Not on file    Years of education: Not on file    Highest education level: Master's degree (e.g., MA, MS, Alan, MEd, MSW, SAMSON)   Occupational History    Not on file   Tobacco Use    Smoking status: Never Smoker    Smokeless tobacco: Never Used   Vaping Use    Vaping Use: Never used   Substance and Sexual Activity    Alcohol use: Not Currently    Drug use: Never    Sexual activity: Yes   Other Topics Concern    Not on file   Social History Narrative    Not on file     Social Determinants of Health     Financial Resource Strain:     Difficulty of Paying Living Expenses:    Food Insecurity:     Worried About Running Out of Food in the Last Year:     [address] in the Last Year:    Transportation Needs:     Lack of Transportation (Medical):  Lack of Transportation (Non-Medical):    Physical Activity:     Days of Exercise per Week:     Minutes of Exercise per Session:    Stress:     Feeling of Stress :    Social Connections:     Frequency of Communication with Friends and Family:     Frequency of Social Gatherings with Friends and Family:     Attends Adventism Services:     Active Member of Clubs or Organizations:     Attends Club or Organization Meetings:     Marital Status:    Intimate Partner Violence:     Fear of Current or Ex-Partner:     Emotionally Abused:     Physically Abused:     Sexually Abused:      TOBACCO:   reports that he has never smoked. He has never used smokeless tobacco.  ETOH:   reports previous alcohol use. Family History:   Family History   Problem Relation Age of Onset    High Blood Pressure Mother     High Cholesterol Mother     Breast Cancer Mother     Alcohol Abuse Mother     High Blood Pressure Father     High Cholesterol Father     Cancer Paternal Aunt     Heart Attack Paternal Uncle     Cancer Maternal Grandmother         breast     Heart Attack Maternal Grandfather     Cancer Paternal Grandmother         \"female region\"    Heart Attack Paternal Grandfather      A:  Patient engaged and cooperative. Denies SI. Insight and motivation are good. Diagnosis:    1. Attention deficit hyperactivity disorder (ADHD), unspecified ADHD type    2. PTSD (post-traumatic stress disorder)      No past medical history on file.   Plan:  Pt interventions:  Discussed benefits of referral for specialty care, Established rapport, Conducted functional assessment, [place]-setting to identify pt's primary goals for [facility] visit / overall health, Supportive techniques, Emphasized self-care as important for managing overall health and Collaboratively set goals with pt re: treatment.     Pt Behavioral Change Plan:   See Pt Instructions

## 2021-06-04 NOTE — PATIENT INSTRUCTIONS
1. Start using a timer to keep yourself on track at work. Start with 30 minute intervals and adjust from there. 2. Return to see Dr. Eubanks Service in 3 weeks. Websites (both offer memberships, but resources are available to non-members as well):    TALA (Children and Adults with ADD):  www. TALA.org    ADDA (Attention Deficit Disorder Association):  www.add.org    Books: Attention Deficit Disorder: The Unfocused Mind in Children and Adults by Dr. Joselin Mcconnell    Driven to Distraction by Dr. Yuniel Funk I'm not [address] [place] [address] or [facility]? Stopping the Roller Coaster When Someone You Love Has Attention Deficit Disorder By Keaton Enamorado and Loralie Klinefelter    Is It You, Me, or Adult ADD?  By Debra Montano and Dr. Abdullahi Jennings

## 2021-06-07 ENCOUNTER — TELEPHONE (OUTPATIENT)
Dept: PSYCHOLOGY | Age: 46
End: 2021-06-07

## 2021-06-07 ENCOUNTER — OFFICE VISIT (OUTPATIENT)
Dept: FAMILY MEDICINE CLINIC | Age: 46
End: 2021-06-07
Payer: OTHER GOVERNMENT

## 2021-06-07 VITALS
HEART RATE: 104 BPM | SYSTOLIC BLOOD PRESSURE: 136 MMHG | WEIGHT: 167 LBS | DIASTOLIC BLOOD PRESSURE: 84 MMHG | OXYGEN SATURATION: 98 % | BODY MASS INDEX: 25.39 KG/M2

## 2021-06-07 DIAGNOSIS — F90.2 ATTENTION DEFICIT HYPERACTIVITY DISORDER (ADHD), COMBINED TYPE: Primary | ICD-10-CM

## 2021-06-07 PROCEDURE — 99213 OFFICE O/P EST LOW 20 MIN: CPT | Performed by: NURSE PRACTITIONER

## 2021-06-07 RX ORDER — DEXTROAMPHETAMINE SACCHARATE, AMPHETAMINE ASPARTATE MONOHYDRATE, DEXTROAMPHETAMINE SULFATE AND AMPHETAMINE SULFATE 5; 5; 5; 5 MG/1; MG/1; MG/1; MG/1
20 CAPSULE, EXTENDED RELEASE ORAL EVERY MORNING
Qty: 30 CAPSULE | Refills: 0 | Status: SHIPPED | OUTPATIENT
Start: 2021-06-07 | End: 2021-06-28 | Stop reason: SDUPTHER

## 2021-06-07 RX ORDER — DEXMETHYLPHENIDATE HYDROCHLORIDE 30 MG/1
30 CAPSULE, EXTENDED RELEASE ORAL DAILY
Qty: 30 CAPSULE | Refills: 0 | Status: SHIPPED | OUTPATIENT
Start: 2021-06-07 | End: 2021-06-07 | Stop reason: ALTCHOICE

## 2021-06-07 ASSESSMENT — ENCOUNTER SYMPTOMS
WHEEZING: 0
SHORTNESS OF BREATH: 0

## 2021-06-07 NOTE — PROGRESS NOTES
Patient: Yogesh Briones is a 55 y.o. male who presents today with the following Chief Complaint(s):  Chief Complaint   Patient presents with    ADHD     dicuss poss meds          HPI   Here to discuss meds for ADHD. Saw Dinora Manriquez and confirmed ADHD. Tried concerta in the past and it did not help. Also tried wellbutrin and it did not help   Has trouble with focus and hyperactivity. Dr. Amparo Aggarwal suggested focalin or vyvanse      Current Outpatient Medications   Medication Sig Dispense Refill    Dexmethylphenidate HCl ER 30 MG CP24 Take 30 mg by mouth daily for 30 days. 30 capsule 0     No current facility-administered medications for this visit. Patient's past medical history, surgical history, family history, medications,  andallergies  were all reviewed and updated as appropriate today. Review of Systems   Constitutional: Negative for appetite change, chills and fever. Respiratory: Negative for shortness of breath and wheezing. Cardiovascular: Negative for chest pain and palpitations. Psychiatric/Behavioral: Negative for sleep disturbance. Physical Exam  Vitals and nursing note reviewed. Constitutional:       Appearance: Normal appearance. He is well-developed. HENT:      Head: Normocephalic and atraumatic. Right Ear: External ear normal.      Left Ear: External ear normal.      Nose: Nose normal.      Mouth/Throat:      Pharynx: No oropharyngeal exudate or posterior oropharyngeal erythema. Eyes:      Conjunctiva/sclera: Conjunctivae normal.   Cardiovascular:      Rate and Rhythm: Normal rate and regular rhythm. Heart sounds: Normal heart sounds. No murmur heard. Pulmonary:      Effort: Pulmonary effort is normal. No respiratory distress. Breath sounds: Normal breath sounds. No wheezing or rales. Musculoskeletal:         General: Normal range of motion. Cervical back: Normal range of motion. Lymphadenopathy:      Cervical: No cervical adenopathy.    Skin: General: Skin is warm and dry. Neurological:      General: No focal deficit present. Mental Status: He is alert and oriented to person, place, and time. Deep Tendon Reflexes: Reflexes are normal and symmetric. Psychiatric:         Mood and Affect: Mood normal.         Behavior: Behavior normal.         Thought Content: Thought content normal.         Judgment: Judgment normal.       Vitals:    06/07/21 1102   BP: 136/84   Pulse: 104   SpO2: 98%       Assessment:  Encounter Diagnosis   Name Primary?  Attention deficit hyperactivity disorder (ADHD), combined type Yes       Plan:  1. Attention deficit hyperactivity disorder (ADHD), combined type    - Dexmethylphenidate HCl ER 30 MG CP24; Take 30 mg by mouth daily for 30 days. Dispense: 30 capsule; Refill: 0        Return in about 1 month (around 7/7/2021) for f/u ADHD.

## 2021-06-07 NOTE — TELEPHONE ENCOUNTER
Patient is interested in a stimulant trial and is a good candidate for a long-acting stimulant like Vyvanse or Focalin XR. He will follow-up with me regularly. Thanks!

## 2021-06-07 NOTE — TELEPHONE ENCOUNTER
Patient called and said he researched the medication that was suggested at today's visit and he said it is more for children and young adults. Can he try adderall ? He will pay out of pocket if insurance does not cover it.   Please advise  [phone]

## 2021-06-22 ENCOUNTER — TELEPHONE (OUTPATIENT)
Dept: FAMILY MEDICINE CLINIC | Age: 46
End: 2021-06-22

## 2021-06-22 NOTE — TELEPHONE ENCOUNTER
----- Message from Fozia Doshi sent at 6/22/2021 11:45 AM EDT -----  Subject: Appointment Request    Reason for Call: Routine Existing Condition Follow Up    QUESTIONS  Type of Appointment? Established Patient  Reason for appointment request? No appointments available during search  Additional Information for Provider? Pt is requesting for a 1 month   medication follow up on 7/6 0r 7/7 but no times are available. Pt states   that extended release wears off before end of work day. Wants to know if   pcp can add a short action dosage. ---------------------------------------------------------------------------  --------------  Jagdish GOULD  What is the best way for the office to contact you? OK to leave message on   voicemail  Preferred Call Back Phone Number? [phone]  ---------------------------------------------------------------------------  --------------  SCRIPT ANSWERS  Relationship to Patient? Self  Appointment reason? Well Care/Follow Ups  Select a Well Care/Follow Ups appointment reason? Adult Existing Condition   Follow Up [Diabetes, CHF, COPD, Hypertension/Blood Pressure Check]  (Is the patient requesting to be seen urgently for their symptoms?)? No  Is this follow up request related to routine Diabetes Management? No  Are you having any new concerns about your existing condition? No  Have you been diagnosed with, awaiting test results for, or told that you   are suspected of having COVID-19 (Coronavirus)? (If patient has tested   negative or was tested as a requirement for work, school, or travel and   not based on symptoms, answer no)? No  Do you currently have flu-like symptoms including fever or chills, cough,   shortness of breath, difficulty breathing, or new loss of taste or smell? No  Have you had close contact with someone with COVID-19 in the last 14 days? No  (Service Expert  click yes below to proceed with Bass Manager As Usual   Scheduling)?  Yes

## 2021-06-22 NOTE — TELEPHONE ENCOUNTER
Patient is scheduled for 6/28/21. He would like to know if another medication can be added to what he has or wait until his appointment on the 28th.  Advise

## 2021-06-28 ENCOUNTER — OFFICE VISIT (OUTPATIENT)
Dept: FAMILY MEDICINE CLINIC | Age: 46
End: 2021-06-28
Payer: OTHER GOVERNMENT

## 2021-06-28 VITALS
HEART RATE: 86 BPM | SYSTOLIC BLOOD PRESSURE: 138 MMHG | DIASTOLIC BLOOD PRESSURE: 86 MMHG | OXYGEN SATURATION: 97 % | BODY MASS INDEX: 25.7 KG/M2 | WEIGHT: 169 LBS

## 2021-06-28 DIAGNOSIS — F90.2 ATTENTION DEFICIT HYPERACTIVITY DISORDER (ADHD), COMBINED TYPE: Primary | ICD-10-CM

## 2021-06-28 PROBLEM — R05.9 COUGH: Status: ACTIVE | Noted: 2021-06-28

## 2021-06-28 PROBLEM — R07.9 CHEST PAIN: Status: ACTIVE | Noted: 2021-06-28

## 2021-06-28 PROBLEM — H52.209 ASTIGMATISM: Status: ACTIVE | Noted: 2021-06-28

## 2021-06-28 PROBLEM — N18.2 STAGE 2 CHRONIC KIDNEY DISEASE: Status: ACTIVE | Noted: 2021-06-28

## 2021-06-28 PROBLEM — J06.9 ACUTE UPPER RESPIRATORY INFECTION: Status: ACTIVE | Noted: 2021-06-28

## 2021-06-28 PROBLEM — H52.13 MYOPIA, BILATERAL: Status: ACTIVE | Noted: 2018-04-10

## 2021-06-28 PROBLEM — K21.9 ESOPHAGEAL REFLUX: Status: ACTIVE | Noted: 2021-06-28

## 2021-06-28 PROBLEM — H35.40 PERIPHERAL RETINAL DEGENERATION: Status: ACTIVE | Noted: 2021-06-28

## 2021-06-28 PROBLEM — G47.00 INSOMNIA, UNSPECIFIED: Status: ACTIVE | Noted: 2017-09-06

## 2021-06-28 PROBLEM — Z00.8 OTHER SPECIFIED GENERAL MEDICAL EXAMINATIONS: Status: ACTIVE | Noted: 2021-06-28

## 2021-06-28 PROBLEM — I10 HYPERTENSION: Status: ACTIVE | Noted: 2021-06-28

## 2021-06-28 PROBLEM — Z63.0 PROBLEMS IN RELATIONSHIP WITH SPOUSE OR PARTNER: Status: ACTIVE | Noted: 2017-08-25

## 2021-06-28 PROBLEM — J02.9 PHARYNGITIS: Status: ACTIVE | Noted: 2021-06-28

## 2021-06-28 PROBLEM — M54.2 CERVICALGIA: Status: ACTIVE | Noted: 2021-06-28

## 2021-06-28 PROBLEM — M51.26 HERNIATED LUMBAR INTERVERTEBRAL DISC: Status: ACTIVE | Noted: 2021-06-28

## 2021-06-28 PROBLEM — R13.10 DYSPHAGIA: Status: ACTIVE | Noted: 2021-06-28

## 2021-06-28 PROBLEM — G47.33 OBSTRUCTIVE SLEEP APNEA SYNDROME: Status: ACTIVE | Noted: 2020-11-11

## 2021-06-28 PROBLEM — F43.22 ADJUSTMENT DISORDER WITH ANXIETY: Status: ACTIVE | Noted: 2021-06-28

## 2021-06-28 PROBLEM — M62.830 BACK MUSCLE SPASM: Status: ACTIVE | Noted: 2021-06-28

## 2021-06-28 PROBLEM — Z48.89 ENCOUNTER FOR POSTOPERATIVE CARE: Status: ACTIVE | Noted: 2021-06-28

## 2021-06-28 PROBLEM — R09.81 NASAL CONGESTION: Status: ACTIVE | Noted: 2021-06-28

## 2021-06-28 PROBLEM — J30.9 ALLERGIC RHINITIS: Status: ACTIVE | Noted: 2021-06-28

## 2021-06-28 PROBLEM — M54.6 PAIN IN THORACIC SPINE: Status: ACTIVE | Noted: 2021-06-28

## 2021-06-28 PROBLEM — M25.539 ARTHRALGIA OF WRIST: Status: ACTIVE | Noted: 2021-06-28

## 2021-06-28 PROBLEM — E78.5 HYPERLIPOPROTEINEMIA: Status: ACTIVE | Noted: 2021-06-28

## 2021-06-28 PROBLEM — M76.60 ACHILLES TENDINITIS: Status: ACTIVE | Noted: 2021-06-28

## 2021-06-28 PROBLEM — R53.83 OTHER FATIGUE: Status: ACTIVE | Noted: 2021-06-28

## 2021-06-28 PROBLEM — R07.1 CHEST PAIN ON BREATHING: Status: ACTIVE | Noted: 2021-06-28

## 2021-06-28 PROBLEM — M54.30 SCIATICA: Status: ACTIVE | Noted: 2021-06-28

## 2021-06-28 PROBLEM — N20.0 NEPHROLITHIASIS: Status: ACTIVE | Noted: 2021-06-28

## 2021-06-28 PROBLEM — R19.7 SYMPTOM OF DIARRHEA: Status: ACTIVE | Noted: 2021-06-28

## 2021-06-28 PROBLEM — H52.223 REGULAR ASTIGMATISM, BILATERAL: Status: ACTIVE | Noted: 2018-04-10

## 2021-06-28 PROBLEM — Z20.822 SEVERE ACUTE RESPIRATORY SYNDROME CORONAVIRUS 2 (SARS-COV-2) TEST RESULT UNKNOWN: Status: ACTIVE | Noted: 2021-06-28

## 2021-06-28 PROBLEM — R03.0 FINDING OF ABOVE NORMAL BLOOD PRESSURE: Status: ACTIVE | Noted: 2021-06-28

## 2021-06-28 PROBLEM — K64.5 THROMBOSED EXTERNAL HEMORRHOIDS: Status: ACTIVE | Noted: 2021-06-28

## 2021-06-28 PROBLEM — E66.3 OVERWEIGHT: Status: ACTIVE | Noted: 2021-06-28

## 2021-06-28 PROBLEM — Z02.89 HEALTH EXAMINATION OF DEFINED SUBPOPULATION: Status: ACTIVE | Noted: 2021-06-28

## 2021-06-28 PROBLEM — K21.00 GASTROESOPHAGEAL REFLUX DISEASE WITH ESOPHAGITIS: Status: ACTIVE | Noted: 2021-06-28

## 2021-06-28 PROBLEM — I82.1: Status: ACTIVE | Noted: 2021-06-28

## 2021-06-28 PROCEDURE — 99213 OFFICE O/P EST LOW 20 MIN: CPT | Performed by: NURSE PRACTITIONER

## 2021-06-28 RX ORDER — DEXTROAMPHETAMINE SACCHARATE, AMPHETAMINE ASPARTATE, DEXTROAMPHETAMINE SULFATE AND AMPHETAMINE SULFATE 2.5; 2.5; 2.5; 2.5 MG/1; MG/1; MG/1; MG/1
10 TABLET ORAL DAILY
Qty: 30 TABLET | Refills: 0 | Status: SHIPPED | OUTPATIENT
Start: 2021-07-07 | End: 2021-08-03 | Stop reason: SDUPTHER

## 2021-06-28 RX ORDER — DEXTROAMPHETAMINE SACCHARATE, AMPHETAMINE ASPARTATE MONOHYDRATE, DEXTROAMPHETAMINE SULFATE AND AMPHETAMINE SULFATE 5; 5; 5; 5 MG/1; MG/1; MG/1; MG/1
20 CAPSULE, EXTENDED RELEASE ORAL EVERY MORNING
Qty: 30 CAPSULE | Refills: 0 | Status: SHIPPED | OUTPATIENT
Start: 2021-07-07 | End: 2021-08-03 | Stop reason: DRUGHIGH

## 2021-06-28 ASSESSMENT — ENCOUNTER SYMPTOMS
SHORTNESS OF BREATH: 0
WHEEZING: 0

## 2021-06-28 NOTE — PROGRESS NOTES
Patient: Julia Philip is a 55 y.o. male who presents today with the following Chief Complaint(s):  Chief Complaint   Patient presents with    Medication Check     discuss adhd meds, states it is helping but not getting him through the day          HPI   ADHD: takes adderall 20 MG XR in the morning- wears off by noon-   Sleeping and eating well. BP stable  Current Outpatient Medications   Medication Sig Dispense Refill    [START ON 7/7/2021] amphetamine-dextroamphetamine (ADDERALL XR) 20 MG extended release capsule Take 1 capsule by mouth every morning for 30 days. 30 capsule 0    [START ON 7/7/2021] amphetamine-dextroamphetamine (ADDERALL, 10MG,) 10 MG tablet Take 1 tablet by mouth daily for 30 days. 30 tablet 0     No current facility-administered medications for this visit. Patient's past medical history, surgical history, family history, medications,  andallergies  were all reviewed and updated as appropriate today. Review of Systems   Constitutional: Negative for appetite change, chills and fever. Respiratory: Negative for shortness of breath and wheezing. Cardiovascular: Negative for chest pain and palpitations. Psychiatric/Behavioral: Positive for decreased concentration (helps- but wears off ). Negative for sleep disturbance. Physical Exam  Vitals and nursing note reviewed. Constitutional:       Appearance: Normal appearance. He is well-developed. HENT:      Head: Normocephalic and atraumatic. Right Ear: External ear normal.      Left Ear: External ear normal.      Nose: Nose normal.      Mouth/Throat:      Pharynx: No oropharyngeal exudate or posterior oropharyngeal erythema. Eyes:      Conjunctiva/sclera: Conjunctivae normal.   Cardiovascular:      Rate and Rhythm: Normal rate and regular rhythm. Heart sounds: Normal heart sounds. No murmur heard. Pulmonary:      Effort: Pulmonary effort is normal. No respiratory distress.       Breath sounds: Normal breath sounds. No wheezing or rales. Musculoskeletal:         General: Normal range of motion. Cervical back: Normal range of motion. Lymphadenopathy:      Cervical: No cervical adenopathy. Skin:     General: Skin is warm and dry. Neurological:      General: No focal deficit present. Mental Status: He is alert and oriented to person, place, and time. Deep Tendon Reflexes: Reflexes are normal and symmetric. Psychiatric:         Mood and Affect: Mood normal.         Behavior: Behavior normal.         Thought Content: Thought content normal.         Judgment: Judgment normal.       Vitals:    06/28/21 1837   BP: 138/86   Pulse:    SpO2:        Assessment:  Encounter Diagnosis   Name Primary?  Attention deficit hyperactivity disorder (ADHD), combined type Yes       Plan:  1. Attention deficit hyperactivity disorder (ADHD), combined type  Add 10mg in the afternoon  - amphetamine-dextroamphetamine (ADDERALL XR) 20 MG extended release capsule; Take 1 capsule by mouth every morning for 30 days. Dispense: 30 capsule; Refill: 0  - amphetamine-dextroamphetamine (ADDERALL, 10MG,) 10 MG tablet; Take 1 tablet by mouth daily for 30 days. Dispense: 30 tablet; Refill: 0        Return in about 1 month (around 7/28/2021) for f/u ADHD- can be VV.

## 2021-07-28 PROBLEM — R05.9 COUGH: Status: RESOLVED | Noted: 2021-06-28 | Resolved: 2021-07-28

## 2021-08-03 ENCOUNTER — VIRTUAL VISIT (OUTPATIENT)
Dept: FAMILY MEDICINE CLINIC | Age: 46
End: 2021-08-03
Payer: OTHER GOVERNMENT

## 2021-08-03 DIAGNOSIS — F90.2 ATTENTION DEFICIT HYPERACTIVITY DISORDER (ADHD), COMBINED TYPE: ICD-10-CM

## 2021-08-03 PROCEDURE — 99213 OFFICE O/P EST LOW 20 MIN: CPT | Performed by: NURSE PRACTITIONER

## 2021-08-03 RX ORDER — DEXTROAMPHETAMINE SACCHARATE, AMPHETAMINE ASPARTATE MONOHYDRATE, DEXTROAMPHETAMINE SULFATE AND AMPHETAMINE SULFATE 6.25; 6.25; 6.25; 6.25 MG/1; MG/1; MG/1; MG/1
25 CAPSULE, EXTENDED RELEASE ORAL EVERY MORNING
Qty: 30 CAPSULE | Refills: 0 | Status: SHIPPED | OUTPATIENT
Start: 2021-08-05 | End: 2021-09-02 | Stop reason: SDUPTHER

## 2021-08-03 RX ORDER — DEXTROAMPHETAMINE SACCHARATE, AMPHETAMINE ASPARTATE, DEXTROAMPHETAMINE SULFATE AND AMPHETAMINE SULFATE 2.5; 2.5; 2.5; 2.5 MG/1; MG/1; MG/1; MG/1
10 TABLET ORAL DAILY
Qty: 30 TABLET | Refills: 0 | Status: SHIPPED | OUTPATIENT
Start: 2021-08-05 | End: 2021-09-02 | Stop reason: SDUPTHER

## 2021-08-03 ASSESSMENT — ENCOUNTER SYMPTOMS
WHEEZING: 0
SHORTNESS OF BREATH: 0

## 2021-08-03 NOTE — PROGRESS NOTES
8/3/2021    TELEHEALTH EVALUATION -- Audio/Visual (During DYHVD-81 public health emergency)    HPI:    Maria Teresa Gonzalez (:  1975) has requested an audio/video evaluation for the following concern(s):    ADHD: adderall 20mg XR, 10mg in the afternoon- 20mg not keeping him focused right now- no trouble with eating or sleeping. Would like to try the 25mg XR for the morning. Review of Systems   Constitutional: Negative for activity change, appetite change and fever. Respiratory: Negative for shortness of breath and wheezing. Cardiovascular: Negative for chest pain and palpitations. Neurological: Negative for dizziness and headaches. Psychiatric/Behavioral: Negative for sleep disturbance. Decreased concentration: 20mg Xr not lasting long enough. Prior to Visit Medications    Medication Sig Taking? Authorizing Provider   amphetamine-dextroamphetamine (ADDERALL XR) 25 MG extended release capsule Take 1 capsule by mouth every morning for 30 days. Yes Jesica Days, APRN - CNP   amphetamine-dextroamphetamine (ADDERALL, 10MG,) 10 MG tablet Take 1 tablet by mouth daily for 30 days.  Yes Jesica Days, APRN - CNP       Social History     Tobacco Use    Smoking status: Never Smoker    Smokeless tobacco: Never Used   Vaping Use    Vaping Use: Never used   Substance Use Topics    Alcohol use: Not Currently    Drug use: Never        Allergies   Allergen Reactions    Cat Hair Extract      Other reaction(s): Unknown   ,   Past Medical History:   Diagnosis Date    Attention deficit hyperactivity disorder (ADHD), combined type    ,   Past Surgical History:   Procedure Laterality Date    HEMORRHOID SURGERY      VASECTOMY     ,   Family History   Problem Relation Age of Onset    High Blood Pressure Mother     High Cholesterol Mother     Breast Cancer Mother     Alcohol Abuse Mother     High Blood Pressure Father     High Cholesterol Father     Cancer Paternal Aunt     Heart Attack Paternal Uncle     Cancer Maternal Grandmother         breast     Heart Attack Maternal Grandfather     Cancer Paternal Grandmother         \"female region\"    Heart Attack Paternal Grandfather        PHYSICAL EXAMINATION:  [ INSTRUCTIONS:  \"[x]\" Indicates a positive item  \"[]\" Indicates a negative item  -- DELETE ALL ITEMS NOT EXAMINED]  Vital Signs: (As obtained by patient/caregiver or practitioner observation)    Blood pressure-  Heart rate-    Respiratory rate-    Temperature-  Pulse oximetry-     Constitutional: [x] Appears well-developed and well-nourished [x] No apparent distress      [] Abnormal-   Mental status  [x] Alert and awake  [x] Oriented to person/place/time [x]Able to follow commands      Eyes:  EOM    []  Normal  [] Abnormal-  Sclera  [x]  Normal  [] Abnormal -         Discharge []  None visible  [] Abnormal -    HENT:   [x] Normocephalic, atraumatic. [] Abnormal   [] Mouth/Throat: Mucous membranes are moist.     External Ears [x] Normal  [] Abnormal-     Neck: [] No visualized mass     Pulmonary/Chest: [x] Respiratory effort normal.  [x] No visualized signs of difficulty breathing or respiratory distress        [] Abnormal-      Musculoskeletal:   [] Normal gait with no signs of ataxia         [x] Normal range of motion of neck        [] Abnormal-       Neurological:        [x] No Facial Asymmetry (Cranial nerve 7 motor function) (limited exam to video visit)          [x] No gaze palsy        [] Abnormal-         Skin:        [x] No significant exanthematous lesions or discoloration noted on facial skin         [] Abnormal-            Psychiatric:       [x] Normal Affect [x] No Hallucinations        [] Abnormal-     Other pertinent observable physical exam findings-     ASSESSMENT/PLAN:  1. Attention deficit hyperactivity disorder (ADHD), combined type    - amphetamine-dextroamphetamine (ADDERALL XR) 25 MG extended release capsule; Take 1 capsule by mouth every morning for 30 days.   Dispense: 30 capsule; Refill: 0  - amphetamine-dextroamphetamine (ADDERALL, 10MG,) 10 MG tablet; Take 1 tablet by mouth daily for 30 days. Dispense: 30 tablet; Refill: 0      No follow-ups on file. Johanne Short, was evaluated through a synchronous (real-time) audio-video encounter. The patient (or guardian if applicable) is aware that this is a billable service. Verbal consent to proceed has been obtained within the past 12 months. The visit was conducted pursuant to the emergency declaration under the [address] [place] [address], [address] [place] [address] authority and the [employer] Act. Patient identification was verified, and a caregiver was present when appropriate. The patient was located in a state where the provider was credentialed to provide care. Total time spent on this encounter: Not billed by time    --YUMIKO Baxter CNP on 8/3/2021 at 9:35 AM    An electronic signature was used to authenticate this note.

## 2021-09-02 ENCOUNTER — VIRTUAL VISIT (OUTPATIENT)
Dept: FAMILY MEDICINE CLINIC | Age: 46
End: 2021-09-02
Payer: OTHER GOVERNMENT

## 2021-09-02 DIAGNOSIS — F90.2 ATTENTION DEFICIT HYPERACTIVITY DISORDER (ADHD), COMBINED TYPE: Primary | ICD-10-CM

## 2021-09-02 PROCEDURE — 99213 OFFICE O/P EST LOW 20 MIN: CPT | Performed by: NURSE PRACTITIONER

## 2021-09-02 RX ORDER — DEXTROAMPHETAMINE SACCHARATE, AMPHETAMINE ASPARTATE, DEXTROAMPHETAMINE SULFATE AND AMPHETAMINE SULFATE 2.5; 2.5; 2.5; 2.5 MG/1; MG/1; MG/1; MG/1
10 TABLET ORAL DAILY
Qty: 30 TABLET | Refills: 0 | Status: SHIPPED | OUTPATIENT
Start: 2021-09-02 | End: 2021-09-30 | Stop reason: SDUPTHER

## 2021-09-02 RX ORDER — DEXTROAMPHETAMINE SACCHARATE, AMPHETAMINE ASPARTATE MONOHYDRATE, DEXTROAMPHETAMINE SULFATE AND AMPHETAMINE SULFATE 6.25; 6.25; 6.25; 6.25 MG/1; MG/1; MG/1; MG/1
25 CAPSULE, EXTENDED RELEASE ORAL EVERY MORNING
Qty: 30 CAPSULE | Refills: 0 | Status: SHIPPED | OUTPATIENT
Start: 2021-09-02 | End: 2021-09-30 | Stop reason: SDUPTHER

## 2021-09-02 ASSESSMENT — ENCOUNTER SYMPTOMS
SHORTNESS OF BREATH: 0
WHEEZING: 0

## 2021-09-02 NOTE — PROGRESS NOTES
2021    TELEHEALTH EVALUATION -- Audio/Visual (During JBCXA-85 public health emergency)    HPI:    Tish Shields (:  1975) has requested an audio/video evaluation for the following concern(s):    ADHD: started adderall 25mg XR in the mornings and 10mg in the afternoon. Working well     Dad passed away   Mom passed away over this last 10 days as well     Review of Systems   Constitutional: Negative for chills, fever and unexpected weight change. Respiratory: Negative for shortness of breath and wheezing. Cardiovascular: Negative for chest pain and palpitations. Psychiatric/Behavioral: Negative for decreased concentration. Prior to Visit Medications    Medication Sig Taking? Authorizing Provider   amphetamine-dextroamphetamine (ADDERALL XR) 25 MG extended release capsule Take 1 capsule by mouth every morning for 30 days. Yes YUMIKO Eisenberg - JONAH   amphetamine-dextroamphetamine (ADDERALL, 10MG,) 10 MG tablet Take 1 tablet by mouth daily for 30 days.  Yes YUMIKO Eisenberg - JONAH       Social History     Tobacco Use    Smoking status: Never Smoker    Smokeless tobacco: Never Used   Vaping Use    Vaping Use: Never used   Substance Use Topics    Alcohol use: Not Currently    Drug use: Never        Allergies   Allergen Reactions    Cat Hair Extract      Other reaction(s): Unknown   ,   Past Medical History:   Diagnosis Date    Attention deficit hyperactivity disorder (ADHD), combined type    ,   Past Surgical History:   Procedure Laterality Date    HEMORRHOID SURGERY      VASECTOMY     ,   Family History   Problem Relation Age of Onset    High Blood Pressure Mother     High Cholesterol Mother     Breast Cancer Mother     Alcohol Abuse Mother     Other Father         COVID    High Blood Pressure Father     High Cholesterol Father     Cancer Maternal Grandmother         breast     Heart Attack Maternal Grandfather     Cancer Paternal Grandmother \"female region\"    Heart Attack Paternal Grandfather     Cancer Paternal Aunt     Heart Attack Paternal Uncle        PHYSICAL EXAMINATION:  [ INSTRUCTIONS:  \"[x]\" Indicates a positive item  \"[]\" Indicates a negative item  -- DELETE ALL ITEMS NOT EXAMINED]  Vital Signs: (As obtained by patient/caregiver or practitioner observation)    Blood pressure-  Heart rate-    Respiratory rate-    Temperature-  Pulse oximetry-     Constitutional: [x] Appears well-developed and well-nourished [x] No apparent distress      [] Abnormal-   Mental status  [x] Alert and awake  [x] Oriented to person/place/time [x]Able to follow commands      Eyes:  EOM    []  Normal  [] Abnormal-  Sclera  [x]  Normal  [] Abnormal -         Discharge []  None visible  [] Abnormal -    HENT:   [x] Normocephalic, atraumatic. [] Abnormal   [] Mouth/Throat: Mucous membranes are moist.     External Ears [] Normal  [] Abnormal-     Neck: [] No visualized mass     Pulmonary/Chest: [x] Respiratory effort normal.  [x] No visualized signs of difficulty breathing or respiratory distress        [] Abnormal-      Musculoskeletal:   [] Normal gait with no signs of ataxia         [x] Normal range of motion of neck        [] Abnormal-       Neurological:        [x] No Facial Asymmetry (Cranial nerve 7 motor function) (limited exam to video visit)          [x] No gaze palsy        [] Abnormal-         Skin:        [x] No significant exanthematous lesions or discoloration noted on facial skin         [] Abnormal-            Psychiatric:       [x] Normal Affect [x] No Hallucinations        [] Abnormal-     Other pertinent observable physical exam findings-     ASSESSMENT/PLAN:  1. Attention deficit hyperactivity disorder (ADHD), combined type    - amphetamine-dextroamphetamine (ADDERALL XR) 25 MG extended release capsule; Take 1 capsule by mouth every morning for 30 days. Dispense: 30 capsule;  Refill: 0  - amphetamine-dextroamphetamine (ADDERALL, 10MG,) 10 MG

## 2021-09-30 DIAGNOSIS — F90.2 ATTENTION DEFICIT HYPERACTIVITY DISORDER (ADHD), COMBINED TYPE: ICD-10-CM

## 2021-09-30 RX ORDER — DEXTROAMPHETAMINE SACCHARATE, AMPHETAMINE ASPARTATE, DEXTROAMPHETAMINE SULFATE AND AMPHETAMINE SULFATE 2.5; 2.5; 2.5; 2.5 MG/1; MG/1; MG/1; MG/1
10 TABLET ORAL DAILY
Qty: 30 TABLET | Refills: 0 | Status: SHIPPED | OUTPATIENT
Start: 2021-09-30 | End: 2021-11-01 | Stop reason: SDUPTHER

## 2021-09-30 RX ORDER — DEXTROAMPHETAMINE SACCHARATE, AMPHETAMINE ASPARTATE MONOHYDRATE, DEXTROAMPHETAMINE SULFATE AND AMPHETAMINE SULFATE 6.25; 6.25; 6.25; 6.25 MG/1; MG/1; MG/1; MG/1
25 CAPSULE, EXTENDED RELEASE ORAL EVERY MORNING
Qty: 30 CAPSULE | Refills: 0 | Status: SHIPPED | OUTPATIENT
Start: 2021-09-30 | End: 2021-11-01 | Stop reason: SDUPTHER

## 2021-09-30 NOTE — TELEPHONE ENCOUNTER
Last Office Visit  -  9/2/21  Next Office Visit  - n/a  Last Filled  -  9/2/21  Last UDS -  n/a  Contract -  N/a

## 2021-10-01 ENCOUNTER — TELEPHONE (OUTPATIENT)
Dept: FAMILY MEDICINE CLINIC | Age: 46
End: 2021-10-01

## 2021-10-01 NOTE — TELEPHONE ENCOUNTER
----- Message from Wesly De La Cruz sent at 10/1/2021 10:45 AM EDT -----  Subject: Message to Provider    QUESTIONS  Information for Provider? Pt called in refill for both his Adderall rx.   called pharmacy and they have not received. Pt wanting to pick this up   today. Please call back to confirm   ---------------------------------------------------------------------------  --------------  CALL BACK INFO  What is the best way for the office to contact you? OK to leave message on   voicemail  Preferred Call Back Phone Number? [phone]  ---------------------------------------------------------------------------  --------------  SCRIPT ANSWERS  Relationship to Patient?  Self

## 2021-10-01 NOTE — TELEPHONE ENCOUNTER
Tried to call Rachael but was hung up on twice. Contacted pt to let him now that rx had been sent. He will try again.

## 2021-11-01 ENCOUNTER — TELEPHONE (OUTPATIENT)
Dept: FAMILY MEDICINE CLINIC | Age: 46
End: 2021-11-01

## 2021-11-01 DIAGNOSIS — F90.2 ATTENTION DEFICIT HYPERACTIVITY DISORDER (ADHD), COMBINED TYPE: ICD-10-CM

## 2021-11-01 RX ORDER — DEXTROAMPHETAMINE SACCHARATE, AMPHETAMINE ASPARTATE, DEXTROAMPHETAMINE SULFATE AND AMPHETAMINE SULFATE 2.5; 2.5; 2.5; 2.5 MG/1; MG/1; MG/1; MG/1
10 TABLET ORAL DAILY
Qty: 30 TABLET | Refills: 0 | Status: SHIPPED | OUTPATIENT
Start: 2021-11-01 | End: 2021-11-30 | Stop reason: SDUPTHER

## 2021-11-01 RX ORDER — DEXTROAMPHETAMINE SACCHARATE, AMPHETAMINE ASPARTATE MONOHYDRATE, DEXTROAMPHETAMINE SULFATE AND AMPHETAMINE SULFATE 6.25; 6.25; 6.25; 6.25 MG/1; MG/1; MG/1; MG/1
25 CAPSULE, EXTENDED RELEASE ORAL EVERY MORNING
Qty: 30 CAPSULE | Refills: 0 | Status: SHIPPED | OUTPATIENT
Start: 2021-11-01 | End: 2021-11-30 | Stop reason: SDUPTHER

## 2021-11-01 NOTE — TELEPHONE ENCOUNTER
----- Message from Jelani Whiting sent at 11/1/2021 10:30 AM EDT -----  Subject: Refill Request    QUESTIONS  Name of Medication? amphetamine-dextroamphetamine (ADDERALL XR) 25 MG   extended release capsule  Patient-reported dosage and instructions? 1 time daily  How many days do you have left? 0  [id number]  Pharmacy phone number (if available)? [phone]  ---------------------------------------------------------------------------  --------------,  Name of Medication? amphetamine-dextroamphetamine (ADDERALL, 10MG,) 10 MG   tablet  Patient-reported dosage and instructions? 1 time daily  How many days do you have left? 0  [id number]  Pharmacy phone number (if available)? [phone]  ---------------------------------------------------------------------------  --------------  Roslyn GOULD  What is the best way for the office to contact you? OK to leave message on   voicemail  Preferred Call Back Phone Number?  [phone]

## 2021-11-30 DIAGNOSIS — F90.2 ATTENTION DEFICIT HYPERACTIVITY DISORDER (ADHD), COMBINED TYPE: ICD-10-CM

## 2021-11-30 RX ORDER — DEXTROAMPHETAMINE SACCHARATE, AMPHETAMINE ASPARTATE MONOHYDRATE, DEXTROAMPHETAMINE SULFATE AND AMPHETAMINE SULFATE 6.25; 6.25; 6.25; 6.25 MG/1; MG/1; MG/1; MG/1
25 CAPSULE, EXTENDED RELEASE ORAL EVERY MORNING
Qty: 30 CAPSULE | Refills: 0 | Status: SHIPPED | OUTPATIENT
Start: 2021-12-01 | End: 2021-12-29 | Stop reason: SDUPTHER

## 2021-11-30 RX ORDER — DEXTROAMPHETAMINE SACCHARATE, AMPHETAMINE ASPARTATE, DEXTROAMPHETAMINE SULFATE AND AMPHETAMINE SULFATE 2.5; 2.5; 2.5; 2.5 MG/1; MG/1; MG/1; MG/1
10 TABLET ORAL DAILY
Qty: 30 TABLET | Refills: 0 | Status: SHIPPED | OUTPATIENT
Start: 2021-12-01 | End: 2021-12-29 | Stop reason: SDUPTHER

## 2021-11-30 NOTE — TELEPHONE ENCOUNTER
Last Office Visit  -  9-2-2021  Next Office Visit  - 12-6-2021    Last Filled  -   Last UDS -    Contract -     Refill amphetamine-dextroamphetamine (ADDERALL XR) 25 MG extended release capsule # 30    Refill amphetamine-dextroamphetamine (ADDERALL, 10MG,) 10 MG  # 30     Pharmacy [facility]

## 2021-12-06 ENCOUNTER — OFFICE VISIT (OUTPATIENT)
Dept: FAMILY MEDICINE CLINIC | Age: 46
End: 2021-12-06
Payer: OTHER GOVERNMENT

## 2021-12-06 ENCOUNTER — TELEPHONE (OUTPATIENT)
Dept: FAMILY MEDICINE CLINIC | Age: 46
End: 2021-12-06

## 2021-12-06 VITALS
BODY MASS INDEX: 25.7 KG/M2 | DIASTOLIC BLOOD PRESSURE: 82 MMHG | WEIGHT: 169 LBS | SYSTOLIC BLOOD PRESSURE: 110 MMHG | HEART RATE: 102 BPM | OXYGEN SATURATION: 98 %

## 2021-12-06 DIAGNOSIS — G47.00 INSOMNIA, UNSPECIFIED TYPE: Primary | ICD-10-CM

## 2021-12-06 DIAGNOSIS — Z82.49 FAMILY HISTORY OF HEART ATTACK: ICD-10-CM

## 2021-12-06 DIAGNOSIS — J01.90 ACUTE SINUSITIS, RECURRENCE NOT SPECIFIED, UNSPECIFIED LOCATION: ICD-10-CM

## 2021-12-06 DIAGNOSIS — F90.9 ATTENTION DEFICIT HYPERACTIVITY DISORDER (ADHD), UNSPECIFIED ADHD TYPE: ICD-10-CM

## 2021-12-06 DIAGNOSIS — F43.9 STRESS: Primary | ICD-10-CM

## 2021-12-06 PROCEDURE — 99214 OFFICE O/P EST MOD 30 MIN: CPT | Performed by: NURSE PRACTITIONER

## 2021-12-06 RX ORDER — ZOLPIDEM TARTRATE 5 MG/1
5 TABLET ORAL NIGHTLY PRN
Qty: 15 TABLET | Refills: 0 | Status: SHIPPED | OUTPATIENT
Start: 2021-12-06 | End: 2022-01-05

## 2021-12-06 RX ORDER — BUSPIRONE HYDROCHLORIDE 5 MG/1
5 TABLET ORAL 3 TIMES DAILY PRN
Qty: 90 TABLET | Refills: 3 | Status: SHIPPED | OUTPATIENT
Start: 2021-12-06 | End: 2022-01-05

## 2021-12-06 RX ORDER — FLUOXETINE HYDROCHLORIDE 20 MG/1
20 CAPSULE ORAL DAILY
Qty: 30 CAPSULE | Refills: 3 | Status: SHIPPED | OUTPATIENT
Start: 2021-12-06 | End: 2022-08-02 | Stop reason: SDUPTHER

## 2021-12-06 RX ORDER — DOXYCYCLINE HYCLATE 100 MG/1
100 CAPSULE ORAL 2 TIMES DAILY
Qty: 14 CAPSULE | Refills: 0 | Status: SHIPPED | OUTPATIENT
Start: 2021-12-06 | End: 2021-12-13

## 2021-12-06 ASSESSMENT — PATIENT HEALTH QUESTIONNAIRE - PHQ9
SUM OF ALL RESPONSES TO PHQ9 QUESTIONS 1 & 2: 0
1. LITTLE INTEREST OR PLEASURE IN DOING THINGS: 0
SUM OF ALL RESPONSES TO PHQ QUESTIONS 1-9: 0
2. FEELING DOWN, DEPRESSED OR HOPELESS: 0
SUM OF ALL RESPONSES TO PHQ QUESTIONS 1-9: 0
SUM OF ALL RESPONSES TO PHQ QUESTIONS 1-9: 0

## 2021-12-06 ASSESSMENT — ENCOUNTER SYMPTOMS
SINUS PAIN: 1
WHEEZING: 0
SINUS PRESSURE: 1
SHORTNESS OF BREATH: 0

## 2021-12-06 NOTE — PROGRESS NOTES
Patient: Odalis Etienne is a 55 y.o. male who presents today with the following Chief Complaint(s):  Chief Complaint   Patient presents with    Stress     all parents and step parents , taking more on with business     Anxiety     worried about his heart     Insomnia         HPI   Stress: step dad, mom, dad  and best friend all passed away recently. Is having to figure out and take care of his parents homes in [place]. Feeling the stress of all this. Needs to use some FMLA/STD for this. Parents and some grandparents have passed from heart attacks. Would like to see cardiology for workup  Is seeing a therapist which has been helpful       Insomnia: tried melatonin- did not help. Had a Cold a few weeks ago- felt better- now day 8 again of having congestion-has been taking sudafed and mucinex, sinus pressure as well now. Had some left over antibiotic that he took- helped some. Current Outpatient Medications   Medication Sig Dispense Refill    FLUoxetine (PROZAC) 20 MG capsule Take 1 capsule by mouth daily 30 capsule 3    busPIRone (BUSPAR) 5 MG tablet Take 1 tablet by mouth 3 times daily as needed (anxiety/stress) Do not exceed 60mg daily 90 tablet 3    doxycycline hyclate (VIBRAMYCIN) 100 MG capsule Take 1 capsule by mouth 2 times daily for 7 days 14 capsule 0    amphetamine-dextroamphetamine (ADDERALL XR) 25 MG extended release capsule Take 1 capsule by mouth every morning for 30 days. 30 capsule 0    amphetamine-dextroamphetamine (ADDERALL, 10MG,) 10 MG tablet Take 1 tablet by mouth daily for 30 days. 30 tablet 0     No current facility-administered medications for this visit. Patient's past medical history, surgical history, family history, medications,  andallergies  were all reviewed and updated as appropriate today. Review of Systems   Constitutional: Negative for chills and fever. HENT: Positive for sinus pressure and sinus pain.     Respiratory: Negative for shortness of breath and wheezing. Cardiovascular: Negative for chest pain and palpitations. Psychiatric/Behavioral: Positive for sleep disturbance. Negative for decreased concentration. The patient is nervous/anxious. Physical Exam  Vitals and nursing note reviewed. Constitutional:       Appearance: Normal appearance. He is well-developed. HENT:      Head: Normocephalic and atraumatic. Right Ear: External ear normal.      Left Ear: External ear normal.      Nose: Nose normal.      Mouth/Throat:      Pharynx: No oropharyngeal exudate or posterior oropharyngeal erythema. Eyes:      Conjunctiva/sclera: Conjunctivae normal.   Cardiovascular:      Rate and Rhythm: Normal rate and regular rhythm. Heart sounds: Normal heart sounds. No murmur heard. Pulmonary:      Effort: Pulmonary effort is normal. No respiratory distress. Breath sounds: Normal breath sounds. No wheezing or rales. Musculoskeletal:         General: Normal range of motion. Cervical back: Normal range of motion. Lymphadenopathy:      Cervical: No cervical adenopathy. Skin:     General: Skin is warm and dry. Neurological:      General: No focal deficit present. Mental Status: He is alert and oriented to person, place, and time. Deep Tendon Reflexes: Reflexes are normal and symmetric. Psychiatric:         Mood and Affect: Mood normal.         Behavior: Behavior normal.         Thought Content: Thought content normal.         Judgment: Judgment normal.       Vitals:    12/06/21 1502   BP: 110/82   Pulse:    SpO2:        Assessment:  Encounter Diagnoses   Name Primary?  Family history of heart attack     Attention deficit hyperactivity disorder (ADHD), unspecified ADHD type     Stress Yes    Acute sinusitis, recurrence not specified, unspecified location        Plan:  1. Family history of heart attack    - Nallely Coronado MD, Cardiology, [facility]    2.  Attention deficit hyperactivity disorder (ADHD), unspecified ADHD type  Continue adderall    3. Stress    - FLUoxetine (PROZAC) 20 MG capsule; Take 1 capsule by mouth daily  Dispense: 30 capsule; Refill: 3  - busPIRone (BUSPAR) 5 MG tablet; Take 1 tablet by mouth 3 times daily as needed (anxiety/stress) Do not exceed 60mg daily  Dispense: 90 tablet; Refill: 3    4. Acute sinusitis, recurrence not specified, unspecified location    - doxycycline hyclate (VIBRAMYCIN) 100 MG capsule; Take 1 capsule by mouth 2 times daily for 7 days  Dispense: 14 capsule; Refill: 0        No follow-ups on file.

## 2021-12-06 NOTE — TELEPHONE ENCOUNTER
Pt requesting Sleep Aid medication. Last Seen Today 12/6/21 with Saba Henriquez. Pt stated he had a discussion with Saba Henriquez during office visit but there was no New Medication sent to his pharmacy. Please Advise and Call Back if PCP will send new medication.     [facility] [address] [place] [address], [address] [place] [address], [facility].

## 2021-12-07 ENCOUNTER — TELEPHONE (OUTPATIENT)
Dept: FAMILY MEDICINE CLINIC | Age: 46
End: 2021-12-07

## 2021-12-07 PROBLEM — F51.01 PRIMARY INSOMNIA: Status: ACTIVE | Noted: 2017-09-06

## 2021-12-07 NOTE — TELEPHONE ENCOUNTER
Caller is requesting confirmation that patient is restricted from work for his STD claim. Please advise.

## 2021-12-08 NOTE — TELEPHONE ENCOUNTER
Need to know when he thinks he will be ok to go back to work. I have to put a date on the form.  Is he planning to go back after he sees cardiology

## 2021-12-29 DIAGNOSIS — F90.2 ATTENTION DEFICIT HYPERACTIVITY DISORDER (ADHD), COMBINED TYPE: ICD-10-CM

## 2021-12-29 RX ORDER — DEXTROAMPHETAMINE SACCHARATE, AMPHETAMINE ASPARTATE MONOHYDRATE, DEXTROAMPHETAMINE SULFATE AND AMPHETAMINE SULFATE 6.25; 6.25; 6.25; 6.25 MG/1; MG/1; MG/1; MG/1
25 CAPSULE, EXTENDED RELEASE ORAL EVERY MORNING
Qty: 30 CAPSULE | Refills: 0 | Status: SHIPPED | OUTPATIENT
Start: 2021-12-29 | End: 2022-01-27 | Stop reason: SDUPTHER

## 2021-12-29 RX ORDER — DEXTROAMPHETAMINE SACCHARATE, AMPHETAMINE ASPARTATE, DEXTROAMPHETAMINE SULFATE AND AMPHETAMINE SULFATE 2.5; 2.5; 2.5; 2.5 MG/1; MG/1; MG/1; MG/1
10 TABLET ORAL DAILY
Qty: 30 TABLET | Refills: 0 | Status: SHIPPED | OUTPATIENT
Start: 2021-12-29 | End: 2022-01-27 | Stop reason: SDUPTHER

## 2022-01-06 NOTE — PROGRESS NOTES
[facility]   Cardiac Consultation    Referring Provider:  Shareen Kawasaki, APRN - CNP     Chief Complaint   Patient presents with   [place] New Patient     heart issues run in his family    Chest Pain     started August 2021    Fatigue     more tired    Edema     hands    Shortness of Breath     due to anxiety      Belgica Singh   1975    History of Present Illness:    Belgica Singh is a 55 y.o. male who is here today as a NEW patient consult for cardiology, referred by Shareen Kawasaki, APRN - CNP for a family history of heart diease. Today he states he has had high blood pressure off and on at PCP visits but has never been on blood pressure medications. He states he has a family history of heart diease in both grandfathers and mother. He states his mother passed away in her home from a heart attack. He states he has left chest pain which feels like a pressure, radiates into his lower back. Chest pain started this past year and occurs daily. Pain can last for minutes to hours. He feels like the pain is brought on by stress. He exercises on a regular basis but does not note any pain during exertion. Patient currently denies any weight gain, edema, palpitations,  dizziness, and syncope. Past Medical History:   has a past medical history of Attention deficit hyperactivity disorder (ADHD), combined type. Surgical History:   has a past surgical history that includes Vasectomy (2015) and Hemorrhoid surgery (2010). Social History:   reports that he has never smoked. He has never used smokeless tobacco. He reports previous alcohol use. He reports that he does not use drugs.      Family History:  family history includes Alcohol Abuse in his mother; Breast Cancer in his mother; Cancer in his maternal grandmother, paternal aunt, and paternal grandmother; Heart Attack in his father, maternal grandfather, mother, paternal grandfather, and paternal uncle; High Blood Pressure in his father and mother; High Cholesterol in his father and mother; Other in his father. Home Medications:  Prior to Admission medications    Medication Sig Start Date End Date Taking? Authorizing Provider   amphetamine-dextroamphetamine (ADDERALL XR) 25 MG extended release capsule Take 1 capsule by mouth every morning for 30 days. 12/29/21 1/28/22 Yes Froylan Smoke, APRN - CNP   amphetamine-dextroamphetamine (ADDERALL, 10MG,) 10 MG tablet Take 1 tablet by mouth daily for 30 days. 12/29/21 1/28/22 Yes Froylan Smoke, APRN - CNP   FLUoxetine (PROZAC) 20 MG capsule Take 1 capsule by mouth daily 12/6/21  Yes Froylan Smoke, APRN - CNP        Allergies:  Cat hair extract     Review of Systems:   · Constitutional: there has been no unanticipated weight loss. There's been no change in energy level, sleep pattern, or activity level. · Eyes: No visual changes or diplopia. No scleral icterus. · ENT: No Headaches, hearing loss or vertigo. No mouth sores or sore throat. · Cardiovascular: Reviewed in HPI  · Respiratory: No cough or wheezing, no sputum production. No hematemesis. · Gastrointestinal: No abdominal pain, appetite loss, blood in stools. No change in bowel or bladder habits. · Genitourinary: No dysuria, trouble voiding, or hematuria. · Musculoskeletal:  No gait disturbance, weakness or joint complaints. · Integumentary: No rash or pruritis. · Neurological: No headache, diplopia, change in muscle strength, numbness or tingling. No change in gait, balance, coordination, mood, affect, memory, mentation, behavior. · Psychiatric: No anxiety, no depression. · Endocrine: No malaise, fatigue or temperature intolerance. No excessive thirst, fluid intake, or urination. No tremor. · Hematologic/Lymphatic: No abnormal bruising or bleeding, blood clots or swollen lymph nodes. · Allergic/Immunologic: No nasal congestion or hives.     Physical Examination:    Vitals:    01/07/22 0939   BP: 134/88   Pulse:    SpO2: Constitutional and General Appearance: NAD   Respiratory:  · Normal excursion and expansion without use of accessory muscles  · Resp Auscultation: Normal breath sounds without dullness  Cardiovascular:  · The apical impulses not displaced  · Heart tones are crisp and normal  · Cervical veins are not engorged  · The carotid upstroke is normal in amplitude and contour without delay or bruit  · Normal S1S2, No S3, No Murmur  · Peripheral pulses are symmetrical and full  · There is no clubbing, cyanosis of the extremities. · No edema  · Femoral Arteries: 2+ and equal  · Pedal Pulses: 2+ and equal   Abdomen:  · No masses or tenderness  · Liver/Spleen: No Abnormalities Noted  Neurological/Psychiatric:  · Alert and oriented in all spheres  · Moves all extremities well  · Exhibits normal gait balance and coordination  · No abnormalities of mood, affect, memory, mentation, or behavior are noted    EKG 1/7/2022  Sinus  Rhythm   -RSR(V1) -nondiagnostic. Assessment:   Abnormal EKG - reviewed w/ pt  Chest pain - typical/atypical features  Family history of heart diease  History of ADHD- indication for Adderall   High normal BP    Plan:  Stress echocardiogram  CT calcium score   Labs- fasting lipids   Cardiac medications reviewed including indications and pertinent side effects    Check blood pressure and heart rate at home a few times per week- keep a log with dates and times and bring to office visit   Regular exercise and following a healthy diet encouraged   Follow up with me in one month to review testing       Scribe's attestation: This note was scribed in the presence of Dr. Gilford Fetter M.D. By Concetta Kraus RN    The scribes documentation has been prepared under my direction and personally reviewed by me in its entirety. I confirm that the note above accurately reflects all work, treatment, procedures, and medical decision making performed by me.     Dr. Gilford Fetter, MD    Thank you for allowing me to

## 2022-01-07 ENCOUNTER — OFFICE VISIT (OUTPATIENT)
Dept: CARDIOLOGY CLINIC | Age: 47
End: 2022-01-07
Payer: OTHER GOVERNMENT

## 2022-01-07 VITALS
HEART RATE: 66 BPM | HEIGHT: 68 IN | BODY MASS INDEX: 25.61 KG/M2 | DIASTOLIC BLOOD PRESSURE: 88 MMHG | WEIGHT: 169 LBS | SYSTOLIC BLOOD PRESSURE: 134 MMHG | OXYGEN SATURATION: 100 %

## 2022-01-07 DIAGNOSIS — Z82.49 FAMILY HISTORY OF HEART DISEASE: ICD-10-CM

## 2022-01-07 DIAGNOSIS — R53.83 FATIGUE, UNSPECIFIED TYPE: ICD-10-CM

## 2022-01-07 DIAGNOSIS — R07.2 PRECORDIAL PAIN: ICD-10-CM

## 2022-01-07 DIAGNOSIS — R94.31 ABNORMAL EKG: ICD-10-CM

## 2022-01-07 DIAGNOSIS — R06.02 SOB (SHORTNESS OF BREATH): Primary | ICD-10-CM

## 2022-01-07 LAB
CHOLESTEROL, TOTAL: 200 MG/DL (ref 0–199)
HDLC SERPL-MCNC: 57 MG/DL (ref 40–60)
LDL CHOLESTEROL CALCULATED: 120 MG/DL
TRIGL SERPL-MCNC: 114 MG/DL (ref 0–150)
VLDLC SERPL CALC-MCNC: 23 MG/DL

## 2022-01-07 PROCEDURE — 93000 ELECTROCARDIOGRAM COMPLETE: CPT | Performed by: INTERNAL MEDICINE

## 2022-01-07 PROCEDURE — 99244 OFF/OP CNSLTJ NEW/EST MOD 40: CPT | Performed by: INTERNAL MEDICINE

## 2022-01-07 NOTE — PATIENT INSTRUCTIONS
Plan:  Stress echocardiogram to evaluate chest pain  Recommend   Labs- fasting lipids   Cardiac medications reviewed including indications and pertinent side effects    Check blood pressure and heart rate at home a few times per week- keep a log with dates and times and bring to office visit   Regular exercise and following a healthy diet encouraged   Follow up with me in one moth to review testing   Your provider has ordered testing for further evaluation. An order/prescription has been included in your paper work.  To schedule outpatient testing, contact Central Scheduling by calling [address] [place] [address] ([phone]).

## 2022-01-10 ENCOUNTER — TELEPHONE (OUTPATIENT)
Dept: CARDIOLOGY CLINIC | Age: 47
End: 2022-01-10

## 2022-01-10 NOTE — TELEPHONE ENCOUNTER
----- Message from Rustam Kaiser MD sent at 1/10/2022 10:46 AM EST -----  Call  Chol a little elevated. Would work on diet and exercise for now.  Would not recommend medication at this time

## 2022-01-18 ENCOUNTER — TELEPHONE (OUTPATIENT)
Dept: CARDIOLOGY CLINIC | Age: 47
End: 2022-01-18

## 2022-01-18 NOTE — TELEPHONE ENCOUNTER
Received paperwork for Sort term disability for patient. Patient has testing scheduled for tomorrow 1/19/2022. Explained to patient that we are not able to fill short term disability paperwork right now. Need to see what testing shows.

## 2022-01-19 ENCOUNTER — TELEPHONE (OUTPATIENT)
Dept: CARDIOLOGY CLINIC | Age: 47
End: 2022-01-19

## 2022-01-19 ENCOUNTER — HOSPITAL ENCOUNTER (OUTPATIENT)
Dept: NON INVASIVE DIAGNOSTICS | Age: 47
Discharge: HOME | End: 2022-01-19
Payer: OTHER GOVERNMENT

## 2022-01-19 ENCOUNTER — HOSPITAL ENCOUNTER (OUTPATIENT)
Dept: CT IMAGING | Age: 47
Discharge: HOME | End: 2022-01-19
Payer: OTHER GOVERNMENT

## 2022-01-19 DIAGNOSIS — Z82.49 FAMILY HISTORY OF HEART DISEASE: ICD-10-CM

## 2022-01-19 DIAGNOSIS — R07.2 PRECORDIAL PAIN: ICD-10-CM

## 2022-01-19 DIAGNOSIS — R94.31 ABNORMAL EKG: ICD-10-CM

## 2022-01-19 DIAGNOSIS — R06.02 SOB (SHORTNESS OF BREATH): ICD-10-CM

## 2022-01-19 LAB
LV EF: 60 %
LVEF MODALITY: NORMAL

## 2022-01-19 PROCEDURE — 75571 CT HRT W/O DYE W/CA TEST: CPT

## 2022-01-19 PROCEDURE — 93351 STRESS TTE COMPLETE: CPT

## 2022-01-19 NOTE — TELEPHONE ENCOUNTER
----- Message from Jeffery Roberts MD sent at 1/19/2022 11:11 AM EST -----  Call  CT shows mild elevated Ca score c/w mild plaque  Also incidental lung nodule and hiatal hernia w/ assoc gastritis - should f/u and d/w PCP  Will review cardiac findings along w/ stress test results at [address] [place] [address] for 2/4/22

## 2022-01-19 NOTE — TELEPHONE ENCOUNTER
----- Message from Herman Dobbs MD sent at 1/19/2022  1:23 PM EST -----  Call  Stress test is normal  As prior message stated - keep 2/4/22 OV to discuss abnormal Ca score

## 2022-01-27 DIAGNOSIS — F90.2 ATTENTION DEFICIT HYPERACTIVITY DISORDER (ADHD), COMBINED TYPE: ICD-10-CM

## 2022-01-27 RX ORDER — DEXTROAMPHETAMINE SACCHARATE, AMPHETAMINE ASPARTATE, DEXTROAMPHETAMINE SULFATE AND AMPHETAMINE SULFATE 2.5; 2.5; 2.5; 2.5 MG/1; MG/1; MG/1; MG/1
10 TABLET ORAL DAILY
Qty: 30 TABLET | Refills: 0 | Status: SHIPPED | OUTPATIENT
Start: 2022-01-27 | End: 2022-02-22 | Stop reason: SDUPTHER

## 2022-01-27 RX ORDER — DEXTROAMPHETAMINE SACCHARATE, AMPHETAMINE ASPARTATE MONOHYDRATE, DEXTROAMPHETAMINE SULFATE AND AMPHETAMINE SULFATE 6.25; 6.25; 6.25; 6.25 MG/1; MG/1; MG/1; MG/1
25 CAPSULE, EXTENDED RELEASE ORAL EVERY MORNING
Qty: 30 CAPSULE | Refills: 0 | Status: SHIPPED | OUTPATIENT
Start: 2022-01-27 | End: 2022-02-22 | Stop reason: SDUPTHER

## 2022-01-27 NOTE — TELEPHONE ENCOUNTER
Requesting Refill  amphetamine-dextroamphetamine (ADDERALL XR) 25 MG extended release     amphetamine-dextroamphetamine (ADDERALL, 10MG,) 10 MG tablet       Last Office Visit  -  12/6/21  Next Office Visit  -  none    Last Filled  -    Last UDS -    Contract -

## 2022-01-30 ENCOUNTER — TELEPHONE (OUTPATIENT)
Dept: CASE MANAGEMENT | Age: 47
End: 2022-01-30

## 2022-02-22 ENCOUNTER — OFFICE VISIT (OUTPATIENT)
Dept: FAMILY MEDICINE CLINIC | Age: 47
End: 2022-02-22
Payer: OTHER GOVERNMENT

## 2022-02-22 VITALS
OXYGEN SATURATION: 98 % | WEIGHT: 172 LBS | HEIGHT: 68 IN | SYSTOLIC BLOOD PRESSURE: 132 MMHG | DIASTOLIC BLOOD PRESSURE: 86 MMHG | HEART RATE: 83 BPM | BODY MASS INDEX: 26.07 KG/M2

## 2022-02-22 DIAGNOSIS — Z00.00 PHYSICAL EXAM: Primary | ICD-10-CM

## 2022-02-22 DIAGNOSIS — F90.2 ATTENTION DEFICIT HYPERACTIVITY DISORDER (ADHD), COMBINED TYPE: ICD-10-CM

## 2022-02-22 PROCEDURE — 99396 PREV VISIT EST AGE 40-64: CPT | Performed by: NURSE PRACTITIONER

## 2022-02-22 RX ORDER — DEXTROAMPHETAMINE SACCHARATE, AMPHETAMINE ASPARTATE MONOHYDRATE, DEXTROAMPHETAMINE SULFATE AND AMPHETAMINE SULFATE 6.25; 6.25; 6.25; 6.25 MG/1; MG/1; MG/1; MG/1
25 CAPSULE, EXTENDED RELEASE ORAL EVERY MORNING
Qty: 30 CAPSULE | Refills: 0 | Status: SHIPPED | OUTPATIENT
Start: 2022-02-23 | End: 2022-03-28 | Stop reason: SDUPTHER

## 2022-02-22 RX ORDER — BUSPIRONE HYDROCHLORIDE 5 MG/1
TABLET ORAL
COMMUNITY
Start: 2022-02-18

## 2022-02-22 RX ORDER — DEXTROAMPHETAMINE SACCHARATE, AMPHETAMINE ASPARTATE, DEXTROAMPHETAMINE SULFATE AND AMPHETAMINE SULFATE 2.5; 2.5; 2.5; 2.5 MG/1; MG/1; MG/1; MG/1
10 TABLET ORAL DAILY
Qty: 30 TABLET | Refills: 0 | Status: SHIPPED | OUTPATIENT
Start: 2022-02-23 | End: 2022-03-28 | Stop reason: SDUPTHER

## 2022-02-22 ASSESSMENT — ENCOUNTER SYMPTOMS
CONSTIPATION: 0
WHEEZING: 0
DIARRHEA: 0
SORE THROAT: 0
ABDOMINAL PAIN: 0
SHORTNESS OF BREATH: 0

## 2022-02-22 NOTE — PROGRESS NOTES
Patient ID: Aidan Lockhart is a 55 y.o. male who presents today for a Physical Exam.      HPI  Here for physical and med check     ADHD: adderall 25 XR in the morning and 10mg in the afternoon- states it is working well. Sleeping and eating well. Past Medical History:   Diagnosis Date    Attention deficit hyperactivity disorder (ADHD), combined type        Past Surgical History:   Procedure Laterality Date    HEMORRHOID SURGERY  2010    VASECTOMY  2015       Family History   Problem Relation Age of Onset    Heart Attack Mother     High Blood Pressure Mother     High Cholesterol Mother     Breast Cancer Mother     Alcohol Abuse Mother     Heart Attack Father     Other Father         COVID    High Blood Pressure Father     High Cholesterol Father     Cancer Maternal Grandmother         breast     Heart Attack Maternal Grandfather     Cancer Paternal Grandmother         \"female region\"    Heart Attack Paternal Grandfather     Cancer Paternal Aunt     Heart Attack Paternal Uncle           Social History     Socioeconomic History    Marital status: Single     Spouse name: None    Number of children: None    Years of education: None    Highest education level: Master's degree (e.g., MA, MS, Alan, MEd, MSW, SAMSON)   Occupational History    None   Tobacco Use    Smoking status: Never Smoker    Smokeless tobacco: Never Used   Vaping Use    Vaping Use: Never used   Substance and Sexual Activity    Alcohol use: Not Currently    Drug use: Never    Sexual activity: Yes   Other Topics Concern    None   Social History Narrative    None     Social Determinants of Health     Financial Resource Strain:     Difficulty of Paying Living Expenses: Not on file   Food Insecurity:     Worried About Running Out of Food in the Last Year: Not on file    Domonique of Food in the Last Year: Not on file   Transportation Needs:     Lack of Transportation (Medical):  Not on file    Lack of Transportation (Non-Medical): Not on file   Physical Activity:     Days of Exercise per Week: Not on file    Minutes of Exercise per Session: Not on file   Stress:     Feeling of Stress : Not on file   Social Connections:     Frequency of Communication with Friends and Family: Not on file    Frequency of Social Gatherings with Friends and Family: Not on file    Attends Scientologist Services: Not on file    Active Member of [address] [place] [address] or Organizations: Not on file    Attends Club or Organization Meetings: Not on file    Marital Status: Not on file   Intimate Partner Violence:     Fear of Current or Ex-Partner: Not on file    Emotionally Abused: Not on file    Physically Abused: Not on file    Sexually Abused: Not on file   Housing Stability:     Unable to Pay for Housing in the Last Year: Not on file    Number of [place] in the Last Year: Not on file    Unstable Housing in the Last Year: Not on file       Allergies   Allergen Reactions    Cat Hair Extract      Other reaction(s): Unknown       Current Outpatient Medications   Medication Sig Dispense Refill    busPIRone (BUSPAR) 5 MG tablet TAKE 1 TABLET BY MOUTH THREE TIMES DAILY AS NEEDED FOR ANXIETY OR STRESS. DO NOT EXCEED 60 MG DAILY      [START ON 2/23/2022] amphetamine-dextroamphetamine (ADDERALL XR) 25 MG extended release capsule Take 1 capsule by mouth every morning for 30 days. 30 capsule 0    [START ON 2/23/2022] amphetamine-dextroamphetamine (ADDERALL, 10MG,) 10 MG tablet Take 1 tablet by mouth daily for 30 days. 30 tablet 0    FLUoxetine (PROZAC) 20 MG capsule Take 1 capsule by mouth daily 30 capsule 3     No current facility-administered medications for this visit. The patient's past medical history, past surgical history, family history, medications, and allergies were all reviewed and updated at appropriate today. Review of Systems   Constitutional: Negative for chills and fever. HENT: Negative for congestion and sore throat.     Eyes: Negative for visual disturbance. Respiratory: Negative for shortness of breath and wheezing. Cardiovascular: Negative for chest pain and palpitations. Gastrointestinal: Negative for abdominal pain, constipation and diarrhea. Genitourinary: Negative. Musculoskeletal: Negative. Skin: Negative. Allergic/Immunologic: Negative for food allergies. Neurological: Negative for dizziness and headaches. Hematological: Does not bruise/bleed easily. Psychiatric/Behavioral: Negative for decreased concentration and sleep disturbance. Physical Exam  Vitals and nursing note reviewed. Constitutional:       Appearance: Normal appearance. He is well-developed. HENT:      Head: Normocephalic and atraumatic. Right Ear: Tympanic membrane and external ear normal.      Left Ear: Tympanic membrane and external ear normal.      Nose: Nose normal.      Mouth/Throat:      Pharynx: No oropharyngeal exudate or posterior oropharyngeal erythema. Eyes:      Conjunctiva/sclera: Conjunctivae normal.   Cardiovascular:      Rate and Rhythm: Normal rate and regular rhythm. Heart sounds: Normal heart sounds. No murmur heard. Pulmonary:      Effort: Pulmonary effort is normal. No respiratory distress. Breath sounds: Normal breath sounds. No wheezing or rales. Abdominal:      General: Bowel sounds are normal. There is no distension. Palpations: Abdomen is soft. Tenderness: There is no abdominal tenderness. There is no rebound. Musculoskeletal:         General: Normal range of motion. Cervical back: Normal range of motion. Lymphadenopathy:      Cervical: No cervical adenopathy. Skin:     General: Skin is warm and dry. Neurological:      General: No focal deficit present. Mental Status: He is alert and oriented to person, place, and time. Deep Tendon Reflexes: Reflexes are normal and symmetric.    Psychiatric:         Mood and Affect: Mood normal.         Behavior: Behavior normal.         Thought Content: Thought content normal.         Judgment: Judgment normal.         Assessment:  Encounter Diagnoses   Name Primary?  Attention deficit hyperactivity disorder (ADHD), combined type     Physical exam Yes       Plan:  1. Attention deficit hyperactivity disorder (ADHD), combined type    - amphetamine-dextroamphetamine (ADDERALL XR) 25 MG extended release capsule; Take 1 capsule by mouth every morning for 30 days. Dispense: 30 capsule; Refill: 0  - amphetamine-dextroamphetamine (ADDERALL, 10MG,) 10 MG tablet; Take 1 tablet by mouth daily for 30 days. Dispense: 30 tablet; Refill: 0    2. Physical exam  Lipids were done by Cardiology  1. Cm Lung nodule was found on CT when CT calcium scoring was done. Suggested to recheck in 6 months to a year             No follow-ups on file.

## 2022-02-22 NOTE — LETTER
[address]  Phone: [phone]  Fax: [phone]    YUMIKO Judge CNP        February 22, 2022     Patient: Royal Segovia   YOB: 1975   Date of Visit: 2/22/2022       To Whom it May Concern:    Teresa Hines was seen in my clinic on 2/22/2022. He may return to work on 3/8/2022. If you have any questions or concerns, please don't hesitate to call.     Sincerely,         YUMIKO Judge CNP

## 2022-02-25 ENCOUNTER — TELEPHONE (OUTPATIENT)
Dept: FAMILY MEDICINE CLINIC | Age: 47
End: 2022-02-25

## 2022-02-25 NOTE — TELEPHONE ENCOUNTER
Spoke to pt and he states that he had been off on FMLA and this is just his return to work note. He just needs it to be changed from 3/8 to 3/4. 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

## 2022-02-25 NOTE — TELEPHONE ENCOUNTER
Received call from Pt requesting that return to work letter date be changed to March 4th instead of March 8th. Pt states that his employer is requesting this today if possible.      Please advise

## 2022-02-28 ENCOUNTER — TELEPHONE (OUTPATIENT)
Dept: FAMILY MEDICINE CLINIC | Age: 47
End: 2022-02-28

## 2022-02-28 NOTE — TELEPHONE ENCOUNTER
-Pt sending \"[employer]\" return to work form that needs completed by PCP. -Pt states his employer is requesting this form be signed by Pt's PCP. -Pt states he also needs to fill out his portion of form & PCP needs to sign.  -As soon as this form is received it will be given to [address] [place] [address].

## 2022-03-28 DIAGNOSIS — F90.2 ATTENTION DEFICIT HYPERACTIVITY DISORDER (ADHD), COMBINED TYPE: ICD-10-CM

## 2022-03-28 RX ORDER — DEXTROAMPHETAMINE SACCHARATE, AMPHETAMINE ASPARTATE, DEXTROAMPHETAMINE SULFATE AND AMPHETAMINE SULFATE 2.5; 2.5; 2.5; 2.5 MG/1; MG/1; MG/1; MG/1
10 TABLET ORAL DAILY
Qty: 30 TABLET | Refills: 0 | Status: SHIPPED | OUTPATIENT
Start: 2022-03-28 | End: 2022-04-25 | Stop reason: SDUPTHER

## 2022-03-28 RX ORDER — DEXTROAMPHETAMINE SACCHARATE, AMPHETAMINE ASPARTATE MONOHYDRATE, DEXTROAMPHETAMINE SULFATE AND AMPHETAMINE SULFATE 6.25; 6.25; 6.25; 6.25 MG/1; MG/1; MG/1; MG/1
25 CAPSULE, EXTENDED RELEASE ORAL EVERY MORNING
Qty: 30 CAPSULE | Refills: 0 | Status: SHIPPED | OUTPATIENT
Start: 2022-03-28 | End: 2022-04-25 | Stop reason: SDUPTHER

## 2022-03-28 NOTE — TELEPHONE ENCOUNTER
Requesting Refill  amphetamine-dextroamphetamine (ADDERALL XR) 25 MG extended     amphetamine-dextroamphetamine (ADDERALL, 10MG,) 10 MG tablet     Last Office Visit  -  2/22/22  Next Office Visit  -5/23/22

## 2022-04-25 DIAGNOSIS — F90.2 ATTENTION DEFICIT HYPERACTIVITY DISORDER (ADHD), COMBINED TYPE: ICD-10-CM

## 2022-04-25 RX ORDER — DEXTROAMPHETAMINE SACCHARATE, AMPHETAMINE ASPARTATE, DEXTROAMPHETAMINE SULFATE AND AMPHETAMINE SULFATE 2.5; 2.5; 2.5; 2.5 MG/1; MG/1; MG/1; MG/1
10 TABLET ORAL DAILY
Qty: 30 TABLET | Refills: 0 | Status: SHIPPED | OUTPATIENT
Start: 2022-04-27 | End: 2022-05-23 | Stop reason: SDUPTHER

## 2022-04-25 RX ORDER — DEXTROAMPHETAMINE SACCHARATE, AMPHETAMINE ASPARTATE MONOHYDRATE, DEXTROAMPHETAMINE SULFATE AND AMPHETAMINE SULFATE 6.25; 6.25; 6.25; 6.25 MG/1; MG/1; MG/1; MG/1
25 CAPSULE, EXTENDED RELEASE ORAL EVERY MORNING
Qty: 30 CAPSULE | Refills: 0 | Status: SHIPPED | OUTPATIENT
Start: 2022-04-27 | End: 2022-05-23 | Stop reason: SDUPTHER

## 2022-04-25 NOTE — TELEPHONE ENCOUNTER
Received call from Pt requesting the following   amphetamine-dextroamphetamine (ADDERALL XR) 25 MG extended release capsule    amphetamine-dextroamphetamine (ADDERALL, 10MG,) 10 MG tablet     Last Office Visit  -  2/22/22  Next Office Visit  -  5/23/22    Please send to [facility] in [place]

## 2022-04-25 NOTE — TELEPHONE ENCOUNTER
Last Office Visit  -  2/22/22  Next Office Visit  -  5/23/22    Last Filled  -  3/28/22  Last UDS -  n/a  Contract -  N/a

## 2022-05-23 ENCOUNTER — TELEMEDICINE (OUTPATIENT)
Dept: FAMILY MEDICINE CLINIC | Age: 47
End: 2022-05-23
Payer: OTHER GOVERNMENT

## 2022-05-23 DIAGNOSIS — F90.2 ATTENTION DEFICIT HYPERACTIVITY DISORDER (ADHD), COMBINED TYPE: Primary | ICD-10-CM

## 2022-05-23 DIAGNOSIS — F43.9 STRESS: ICD-10-CM

## 2022-05-23 PROCEDURE — 99213 OFFICE O/P EST LOW 20 MIN: CPT | Performed by: NURSE PRACTITIONER

## 2022-05-23 RX ORDER — DEXTROAMPHETAMINE SACCHARATE, AMPHETAMINE ASPARTATE MONOHYDRATE, DEXTROAMPHETAMINE SULFATE AND AMPHETAMINE SULFATE 6.25; 6.25; 6.25; 6.25 MG/1; MG/1; MG/1; MG/1
25 CAPSULE, EXTENDED RELEASE ORAL EVERY MORNING
Qty: 30 CAPSULE | Refills: 0 | Status: SHIPPED | OUTPATIENT
Start: 2022-05-27 | End: 2022-06-28 | Stop reason: SDUPTHER

## 2022-05-23 RX ORDER — DEXTROAMPHETAMINE SACCHARATE, AMPHETAMINE ASPARTATE, DEXTROAMPHETAMINE SULFATE AND AMPHETAMINE SULFATE 2.5; 2.5; 2.5; 2.5 MG/1; MG/1; MG/1; MG/1
10 TABLET ORAL DAILY
Qty: 30 TABLET | Refills: 0 | Status: SHIPPED | OUTPATIENT
Start: 2022-05-27 | End: 2022-06-28 | Stop reason: SDUPTHER

## 2022-05-23 NOTE — PROGRESS NOTES
Mika Lauren is a 52 y.o. male evaluated via telephone on 5/23/2022 for Medication Check  . Documentation:  I communicated with the patient and/or health care decision maker about   ADHD: Adderall 25mg XR in the morning and Adderall 10mg in the afternoon. Doing well- no complaints- no trouble sleeping or eating   Anxiety: doing well on prozac and buspar    Details of this discussion including any medical advice provided:   1. Stress  Continue prozac and buspar     2. Attention deficit hyperactivity disorder (ADHD), combined type    - amphetamine-dextroamphetamine (ADDERALL, 10MG,) 10 MG tablet; Take 1 tablet by mouth daily for 30 days. Dispense: 30 tablet; Refill: 0  - amphetamine-dextroamphetamine (ADDERALL XR) 25 MG extended release capsule; Take 1 capsule by mouth every morning for 30 days. Dispense: 30 capsule; Refill: 0        Total Time: not billed by time    Mika Lauren was evaluated through a synchronous (real-time) audio encounter. Patient identification was verified at the start of the visit. He (or guardian if applicable) is aware that this is a billable service, which includes applicable co-pays. This visit was conducted with the patient's (and/or legal guardian's) verbal consent. He has not had a related appointment within my department in the past 7 days or scheduled within the next 24 hours. The patient was located in a state where the provider was licensed to provide care.     Note: not billable if this call serves to triage the patient into an appointment for the relevant concern    Olen Sandhoff, YUMIKO - CNP

## 2022-06-28 DIAGNOSIS — F90.2 ATTENTION DEFICIT HYPERACTIVITY DISORDER (ADHD), COMBINED TYPE: ICD-10-CM

## 2022-06-28 RX ORDER — DEXTROAMPHETAMINE SACCHARATE, AMPHETAMINE ASPARTATE, DEXTROAMPHETAMINE SULFATE AND AMPHETAMINE SULFATE 2.5; 2.5; 2.5; 2.5 MG/1; MG/1; MG/1; MG/1
10 TABLET ORAL DAILY
Qty: 30 TABLET | Refills: 0 | Status: SHIPPED | OUTPATIENT
Start: 2022-06-28 | End: 2022-08-01 | Stop reason: SDUPTHER

## 2022-06-28 RX ORDER — DEXTROAMPHETAMINE SACCHARATE, AMPHETAMINE ASPARTATE MONOHYDRATE, DEXTROAMPHETAMINE SULFATE AND AMPHETAMINE SULFATE 6.25; 6.25; 6.25; 6.25 MG/1; MG/1; MG/1; MG/1
25 CAPSULE, EXTENDED RELEASE ORAL EVERY MORNING
Qty: 30 CAPSULE | Refills: 0 | Status: SHIPPED | OUTPATIENT
Start: 2022-06-28 | End: 2022-08-01 | Stop reason: SDUPTHER

## 2022-08-01 DIAGNOSIS — F90.2 ATTENTION DEFICIT HYPERACTIVITY DISORDER (ADHD), COMBINED TYPE: ICD-10-CM

## 2022-08-01 RX ORDER — DEXTROAMPHETAMINE SACCHARATE, AMPHETAMINE ASPARTATE MONOHYDRATE, DEXTROAMPHETAMINE SULFATE AND AMPHETAMINE SULFATE 6.25; 6.25; 6.25; 6.25 MG/1; MG/1; MG/1; MG/1
25 CAPSULE, EXTENDED RELEASE ORAL EVERY MORNING
Qty: 30 CAPSULE | Refills: 0 | Status: SHIPPED | OUTPATIENT
Start: 2022-08-01 | End: 2022-09-01 | Stop reason: SDUPTHER

## 2022-08-01 RX ORDER — DEXTROAMPHETAMINE SACCHARATE, AMPHETAMINE ASPARTATE, DEXTROAMPHETAMINE SULFATE AND AMPHETAMINE SULFATE 2.5; 2.5; 2.5; 2.5 MG/1; MG/1; MG/1; MG/1
10 TABLET ORAL DAILY
Qty: 30 TABLET | Refills: 0 | Status: SHIPPED | OUTPATIENT
Start: 2022-08-01 | End: 2022-09-01 | Stop reason: SDUPTHER

## 2022-08-01 NOTE — TELEPHONE ENCOUNTER
Last visit 5/23/22 NB  Future 8/2/22 NB    Refill   amphetamine-dextroamphetamine (ADDERALL, 10MG,) 10 MG tablet    amphetamine-dextroamphetamine (ADDERALL XR) 25 MG extended release capsule    [facility]

## 2022-08-02 ENCOUNTER — TELEMEDICINE (OUTPATIENT)
Dept: FAMILY MEDICINE CLINIC | Age: 47
End: 2022-08-02
Payer: OTHER GOVERNMENT

## 2022-08-02 DIAGNOSIS — F43.9 STRESS: ICD-10-CM

## 2022-08-02 DIAGNOSIS — F90.2 ATTENTION DEFICIT HYPERACTIVITY DISORDER (ADHD), COMBINED TYPE: Primary | ICD-10-CM

## 2022-08-02 PROCEDURE — 99213 OFFICE O/P EST LOW 20 MIN: CPT | Performed by: NURSE PRACTITIONER

## 2022-08-02 RX ORDER — FLUOXETINE HYDROCHLORIDE 20 MG/1
20 CAPSULE ORAL DAILY
Qty: 30 CAPSULE | Refills: 5 | Status: SHIPPED | OUTPATIENT
Start: 2022-08-02

## 2022-08-02 ASSESSMENT — PATIENT HEALTH QUESTIONNAIRE - PHQ9
SUM OF ALL RESPONSES TO PHQ QUESTIONS 1-9: 0
2. FEELING DOWN, DEPRESSED OR HOPELESS: 0
SUM OF ALL RESPONSES TO PHQ QUESTIONS 1-9: 0
1. LITTLE INTEREST OR PLEASURE IN DOING THINGS: 0
SUM OF ALL RESPONSES TO PHQ9 QUESTIONS 1 & 2: 0
SUM OF ALL RESPONSES TO PHQ QUESTIONS 1-9: 0
SUM OF ALL RESPONSES TO PHQ QUESTIONS 1-9: 0

## 2022-08-02 ASSESSMENT — ENCOUNTER SYMPTOMS
SHORTNESS OF BREATH: 0
WHEEZING: 0

## 2022-08-02 NOTE — PROGRESS NOTES
2022    TELEHEALTH EVALUATION -- Audio/Visual (During WLTIK-17 public health emergency)    HPI:    Megan Lozano (:  1975) has requested an audio/video evaluation for the following concern(s):    ADHD: adderall 25mg in the morning and 10mg in the afternoon no trouble eating or sleeping. Wants referral to psych: taking prozac 20mg and buspar PRN. Had stopped taking them for a few months but started them again recently and feels better. Having a hard time through losing his mom, dad and step dad last year. Wants to talk to someone and work on himself . Review of Systems   Constitutional:  Negative for chills and fever. Respiratory:  Negative for shortness of breath and wheezing. Cardiovascular:  Negative for chest pain and palpitations. Psychiatric/Behavioral:  Positive for dysphoric mood. Negative for decreased concentration (meds helping). The patient is not nervous/anxious. Prior to Visit Medications    Medication Sig Taking? Authorizing Provider   FLUoxetine (PROZAC) 20 MG capsule Take 1 capsule by mouth in the morning. Yes Oakley Conch, APRN - CNP   amphetamine-dextroamphetamine (ADDERALL, 10MG,) 10 MG tablet Take 1 tablet by mouth in the morning for 30 days. Yes Oakley Conch, APRN - CNP   amphetamine-dextroamphetamine (ADDERALL XR) 25 MG extended release capsule Take 1 capsule by mouth every morning for 30 days. Yes Oakley Conch, APRN - CNP   busPIRone (BUSPAR) 5 MG tablet TAKE 1 TABLET BY MOUTH THREE TIMES DAILY AS NEEDED FOR ANXIETY OR STRESS.  DO NOT EXCEED 60 MG DAILY Yes Historical Provider, MD       Social History     Tobacco Use    Smoking status: Never    Smokeless tobacco: Never   Vaping Use    Vaping Use: Never used   Substance Use Topics    Alcohol use: Not Currently    Drug use: Never        Allergies   Allergen Reactions    Cat Hair Extract      Other reaction(s): Unknown   ,   Past Medical History:   Diagnosis Date    Attention deficit hyperactivity disorder (ADHD), combined type    ,   Past Surgical History:   Procedure Laterality Date    HEMORRHOID SURGERY  2010    VASECTOMY  2015   ,   Social History     Tobacco Use    Smoking status: Never    Smokeless tobacco: Never   Vaping Use    Vaping Use: Never used   Substance Use Topics    Alcohol use: Not Currently    Drug use: Never   ,   Family History   Problem Relation Age of Onset    Heart Attack Mother     High Blood Pressure Mother     High Cholesterol Mother     Breast Cancer Mother     Alcohol Abuse Mother     Heart Attack Father     Other Father         COVID    High Blood Pressure Father     High Cholesterol Father     Cancer Maternal Grandmother         breast     Heart Attack Maternal Grandfather     Cancer Paternal Grandmother         \"female region\"    Heart Attack Paternal Grandfather     Cancer Paternal Aunt     Heart Attack Paternal Uncle        PHYSICAL EXAMINATION:  [ INSTRUCTIONS:  \"[x]\" Indicates a positive item  \"[]\" Indicates a negative item  -- DELETE ALL ITEMS NOT EXAMINED]  Vital Signs: (As obtained by patient/caregiver or practitioner observation)    Blood pressure-  Heart rate-    Respiratory rate-    Temperature-  Pulse oximetry-     Constitutional: [x] Appears well-developed and well-nourished [x] No apparent distress      [] Abnormal-   Mental status  [x] Alert and awake  [x] Oriented to person/place/time [x]Able to follow commands      Eyes:  EOM    []  Normal  [] Abnormal-  Sclera  [x]  Normal  [] Abnormal -         Discharge []  None visible  [] Abnormal -    HENT:   [x] Normocephalic, atraumatic.   [] Abnormal   [] Mouth/Throat: Mucous membranes are moist.     External Ears [] Normal  [] Abnormal-     Neck: [] No visualized mass     Pulmonary/Chest: [x] Respiratory effort normal.  [x] No visualized signs of difficulty breathing or respiratory distress        [] Abnormal-      Musculoskeletal:   [] Normal gait with no signs of ataxia         [x] Normal range of motion of neck [] Abnormal-       Neurological:        [x] No Facial Asymmetry (Cranial nerve 7 motor function) (limited exam to video visit)          [x] No gaze palsy        [] Abnormal-         Skin:        [x] No significant exanthematous lesions or discoloration noted on facial skin         [] Abnormal-            Psychiatric:       [x] Normal Affect [x] No Hallucinations        [] Abnormal-     Other pertinent observable physical exam findings-     ASSESSMENT/PLAN:  1. Stress  Get back on prozac 20mg daily- feeling better back on med  Will make appt with Dr. Evelyn Brian- may need longer term therapy  - FLUoxetine (PROZAC) 20 MG capsule; Take 1 capsule by mouth in the morning. Dispense: 30 capsule; Refill: 5    2. Attention deficit hyperactivity disorder (ADHD), combined type  Stable- continue adderall 25mg in the morning and 10mg in the afternoon       No follow-ups on file. Christopher Ramirez, was evaluated through a synchronous (real-time) audio-video encounter. The patient (or guardian if applicable) is aware that this is a billable service, which includes applicable co-pays. This Virtual Visit was conducted with patient's (and/or legal guardian's) consent. The visit was conducted pursuant to the emergency declaration under the [address] [place] [address] authority and the [employer] General Act. Patient identification was verified, and a caregiver was present when appropriate. The patient was located at work in [place]  Provider was located at [facility] (Appt Dept): [facility]. Total time spent on this encounter: Not billed by time    --YUMIKO Allen - CNP on 8/2/2022 at 11:18 AM    An electronic signature was used to authenticate this note.

## 2022-09-01 DIAGNOSIS — F90.2 ATTENTION DEFICIT HYPERACTIVITY DISORDER (ADHD), COMBINED TYPE: ICD-10-CM

## 2022-09-01 RX ORDER — DEXTROAMPHETAMINE SACCHARATE, AMPHETAMINE ASPARTATE, DEXTROAMPHETAMINE SULFATE AND AMPHETAMINE SULFATE 2.5; 2.5; 2.5; 2.5 MG/1; MG/1; MG/1; MG/1
10 TABLET ORAL DAILY
Qty: 30 TABLET | Refills: 0 | Status: SHIPPED | OUTPATIENT
Start: 2022-09-01 | End: 2022-09-29 | Stop reason: SDUPTHER

## 2022-09-01 RX ORDER — DEXTROAMPHETAMINE SACCHARATE, AMPHETAMINE ASPARTATE MONOHYDRATE, DEXTROAMPHETAMINE SULFATE AND AMPHETAMINE SULFATE 6.25; 6.25; 6.25; 6.25 MG/1; MG/1; MG/1; MG/1
25 CAPSULE, EXTENDED RELEASE ORAL EVERY MORNING
Qty: 30 CAPSULE | Refills: 0 | Status: SHIPPED | OUTPATIENT
Start: 2022-09-01 | End: 2022-09-29 | Stop reason: SDUPTHER

## 2022-09-01 NOTE — TELEPHONE ENCOUNTER
Received call from Pt requesting the following:   amphetamine-dextroamphetamine (ADDERALL XR) 25 MG extended release capsule    amphetamine-dextroamphetamine (ADDERALL, 10MG,) 10 MG tablet     Last Office Visit  -  8/2/22  Next Office Visit  -  none

## 2022-09-07 ENCOUNTER — OFFICE VISIT (OUTPATIENT)
Dept: PSYCHOLOGY | Age: 47
End: 2022-09-07
Payer: OTHER GOVERNMENT

## 2022-09-07 DIAGNOSIS — F43.10 COMPLEX POSTTRAUMATIC STRESS DISORDER: Primary | ICD-10-CM

## 2022-09-07 PROCEDURE — 90791 PSYCH DIAGNOSTIC EVALUATION: CPT | Performed by: PSYCHOLOGIST

## 2022-09-07 ASSESSMENT — ANXIETY QUESTIONNAIRES
6. BECOMING EASILY ANNOYED OR IRRITABLE: 1-SEVERAL DAYS
4. TROUBLE RELAXING: 1-SEVERAL DAYS
1. FEELING NERVOUS, ANXIOUS, OR ON EDGE: 0
7. FEELING AFRAID AS IF SOMETHING AWFUL MIGHT HAPPEN: 0-NOT AT ALL
3. WORRYING TOO MUCH ABOUT DIFFERENT THINGS: 0-NOT AT ALL
GAD7 TOTAL SCORE: 2
2. NOT BEING ABLE TO STOP OR CONTROL WORRYING: 0-NOT AT ALL
5. BEING SO RESTLESS THAT IT IS HARD TO SIT STILL: 0-NOT AT ALL

## 2022-09-07 ASSESSMENT — PATIENT HEALTH QUESTIONNAIRE - PHQ9
SUM OF ALL RESPONSES TO PHQ QUESTIONS 1-9: 0
2. FEELING DOWN, DEPRESSED OR HOPELESS: 0
SUM OF ALL RESPONSES TO PHQ QUESTIONS 1-9: 0
1. LITTLE INTEREST OR PLEASURE IN DOING THINGS: 0
SUM OF ALL RESPONSES TO PHQ QUESTIONS 1-9: 0
SUM OF ALL RESPONSES TO PHQ9 QUESTIONS 1 & 2: 0
SUM OF ALL RESPONSES TO PHQ QUESTIONS 1-9: 0

## 2022-09-07 NOTE — PATIENT INSTRUCTIONS
Consider reading \"The Body Keeps the Score. \"    Consider contacting the [facility] for PTSD treatment, either in PTSD Clinic or the OIF/OEF clinic. You could also contact the [address] [place] [address] [facility] [phone].

## 2022-09-07 NOTE — PROGRESS NOTES
Behavioral Health Consultation  Becky Brooks, Ph.D.  Psychologist  2022  11:36 AM EDT      Time spent with Patient: 30 minutes  This is patient's first [facility] appointment. Reason for Consult:    Chief Complaint   Patient presents with    Stress     Referring Provider: YUMIKO Gastelum CNP  [address] [address]    Pt provided informed consent for the behavioral health program. Discussed with patient model of service to include the limits of confidentiality (i.e. abuse reporting, suicide intervention, etc.) and short-term intervention focused approach. Pt indicated understanding. Feedback given to PCP. S:  Patient presents with concerns about processing multiple traumatic losses last year. Sx have been present for about a year. Last summer, his father was in the ICU with covid, later passed away (patient was present as he passed away). Managed his affairs while continuing his work in [employer]. Shortly after his father , he learned that his best friend had  in a boat accident. His mother also  later that day, and his step-father  a few months later. Pt reported the following symptoms: intrusive thoughts, nightmares, dissociative reactions, psychological distress to cues, physiological reaction to cues, negative self-beliefs, persistent negative emotional state, detachment from others, anhedonia, irritability, and sleep disturbance. Symptoms have been present for more than one month. Goals for treatment incude processing trauma, developing coping strategies, communicating with more empathy and establishing with an .      O:  MSE:    Appearance: good hygiene   Attitude: cooperative, friendly, and tearful  Consciousness: alert  Orientation: oriented to person, place, time, general circumstance  Memory: recent and remote memory intact  Attention/Concentration: intact during session  Psychomotor Activity:normal  Eye Contact: normal  Speech: normal rate and volume, well-articulated  Mood: stressed  Affect: anxious  Perception: within normal limits  Thought Content: all-or-none thinking and intrusive thoughts  Thought Process: logical, coherent and goal-directed  Insight: good  Judgment: intact  Ability to understand instructions: Yes  Ability to respond meaningfully: Yes  Morbid Ideation: no   Suicide Assessment: no suicidal ideation, plan, or intent  Homicidal Ideation: no    History:    Medications:   Current Outpatient Medications   Medication Sig Dispense Refill    amphetamine-dextroamphetamine (ADDERALL, 10MG,) 10 MG tablet Take 1 tablet by mouth daily for 30 days. 30 tablet 0    amphetamine-dextroamphetamine (ADDERALL XR) 25 MG extended release capsule Take 1 capsule by mouth every morning for 30 days. 30 capsule 0    FLUoxetine (PROZAC) 20 MG capsule Take 1 capsule by mouth in the morning. 30 capsule 5    busPIRone (BUSPAR) 5 MG tablet TAKE 1 TABLET BY MOUTH THREE TIMES DAILY AS NEEDED FOR ANXIETY OR STRESS. DO NOT EXCEED 60 MG DAILY       No current facility-administered medications for this visit.      Social History:   Social History     Socioeconomic History    Marital status: Single     Spouse name: Not on file    Number of children: Not on file    Years of education: Not on file    Highest education level: Master's degree (e.g., MA, MS, Alan, MEd, MSW, SAMSON)   Occupational History    Not on file   Tobacco Use    Smoking status: Never    Smokeless tobacco: Never   Vaping Use    Vaping Use: Never used   Substance and Sexual Activity    Alcohol use: Not Currently    Drug use: Never    Sexual activity: Yes   Other Topics Concern    Not on file   Social History Narrative    Not on file     Social Determinants of Health     Financial Resource Strain: Not on file   Food Insecurity: Not on file   Transportation Needs: Not on file   Physical Activity: Not on file   Stress: Not on file   Social Connections: Not on file   Intimate Partner Violence: Not on file Housing Stability: Not on file     TOBACCO:   reports that he has never smoked. He has never used smokeless tobacco.  ETOH:   reports that he does not currently use alcohol. Family History:   Family History   Problem Relation Age of Onset    Heart Attack Mother     High Blood Pressure Mother     High Cholesterol Mother     Breast Cancer Mother     Alcohol Abuse Mother     Heart Attack Father     Other Father         COVID    High Blood Pressure Father     High Cholesterol Father     Cancer Maternal Grandmother         breast     Heart Attack Maternal Grandfather     Cancer Paternal Grandmother         \"female region\"    Heart Attack Paternal Grandfather     Cancer Paternal Aunt     Heart Attack Paternal Uncle      A:  Administered PHQ-9 and KIRAN-7 (see below). Patient endorses minimal symptoms of depression and subclinical symptoms of anxiety. Denies SI. Insight and motivation are good. PHQ Scores 9/7/2022 8/2/2022 12/6/2021 3/29/2021 10/7/2020   PHQ2 Score 0 0 0 0 2   PHQ9 Score 0 0 0 0 2     Interpretation of Total Score Depression Severity: 1-4 = Minimal depression, 5-9 = Mild depression, 10-14 = Moderate depression, 15-19 = Moderately severe depression, 20-27 = Severe depression    KIRAN 7 SCORE 9/7/2022   KIRAN-7 Total Score 2     Interpretation of KIRAN-7 score: 5-9 = mild anxiety, 10-14 = moderate anxiety, 15+ = severe anxiety. Recommend referral to behavioral health for scores 10 or greater. Diagnosis:    1.  Complex posttraumatic stress disorder          Diagnosis Date    Attention deficit hyperactivity disorder (ADHD), combined type      Plan:  Pt interventions:  Provided education on PTSD symptoms and treatment options for evidence-based treatment (Cognitive Processing Therapy and Prolonged Exposure), Established rapport, Conducted functional assessment, [place]-setting to identify pt's primary goals for [facility] visit / overall health, Supportive techniques, Emphasized self-care as important for managing overall health, Reviewed options for identifying appropriate providers, and Provided pt book recommendation.     Pt Behavioral Change Plan:   See Pt Instructions

## 2022-09-21 ENCOUNTER — OFFICE VISIT (OUTPATIENT)
Dept: PSYCHOLOGY | Age: 47
End: 2022-09-21
Payer: OTHER GOVERNMENT

## 2022-09-21 DIAGNOSIS — F43.10 COMPLEX POSTTRAUMATIC STRESS DISORDER: Primary | ICD-10-CM

## 2022-09-21 PROCEDURE — 90832 PSYTX W PT 30 MINUTES: CPT | Performed by: PSYCHOLOGIST

## 2022-09-21 ASSESSMENT — ANXIETY QUESTIONNAIRES
7. FEELING AFRAID AS IF SOMETHING AWFUL MIGHT HAPPEN: 0-NOT AT ALL
3. WORRYING TOO MUCH ABOUT DIFFERENT THINGS: 0-NOT AT ALL
1. FEELING NERVOUS, ANXIOUS, OR ON EDGE: 0
GAD7 TOTAL SCORE: 2
6. BECOMING EASILY ANNOYED OR IRRITABLE: 1-SEVERAL DAYS
5. BEING SO RESTLESS THAT IT IS HARD TO SIT STILL: 0-NOT AT ALL
2. NOT BEING ABLE TO STOP OR CONTROL WORRYING: 0-NOT AT ALL
4. TROUBLE RELAXING: 1-SEVERAL DAYS

## 2022-09-21 ASSESSMENT — PATIENT HEALTH QUESTIONNAIRE - PHQ9
SUM OF ALL RESPONSES TO PHQ9 QUESTIONS 1 & 2: 1
1. LITTLE INTEREST OR PLEASURE IN DOING THINGS: 1
SUM OF ALL RESPONSES TO PHQ QUESTIONS 1-9: 1
2. FEELING DOWN, DEPRESSED OR HOPELESS: 0

## 2022-09-21 NOTE — PATIENT INSTRUCTIONS
Consider reading \"The Body Keeps the Score. \"     Consider contacting the [place] for PTSD treatment, either in PTSD Clinic or the OIF/OEF clinic. You could also contact the [address] [place] [address] [phone].

## 2022-09-21 NOTE — PROGRESS NOTES
morning for 30 days. 30 capsule 0    FLUoxetine (PROZAC) 20 MG capsule Take 1 capsule by mouth in the morning. 30 capsule 5    busPIRone (BUSPAR) 5 MG tablet TAKE 1 TABLET BY MOUTH THREE TIMES DAILY AS NEEDED FOR ANXIETY OR STRESS. DO NOT EXCEED 60 MG DAILY       No current facility-administered medications for this visit. Social History:   Social History     Socioeconomic History    Marital status: Single     Spouse name: Not on file    Number of children: Not on file    Years of education: Not on file    Highest education level: Master's degree (e.g., MA, MS, Alan, MEd, MSW, SAMSON)   Occupational History    Not on file   Tobacco Use    Smoking status: Never    Smokeless tobacco: Never   Vaping Use    Vaping Use: Never used   Substance and Sexual Activity    Alcohol use: Not Currently    Drug use: Never    Sexual activity: Yes   Other Topics Concern    Not on file   Social History Narrative    Not on file     Social Determinants of Health     Financial Resource Strain: Not on file   Food Insecurity: Not on file   Transportation Needs: Not on file   Physical Activity: Not on file   Stress: Not on file   Social Connections: Not on file   Intimate Partner Violence: Not on file   Housing Stability: Not on file     TOBACCO:   reports that he has never smoked. He has never used smokeless tobacco.  ETOH:   reports that he does not currently use alcohol.   Family History:   Family History   Problem Relation Age of Onset    Heart Attack Mother     High Blood Pressure Mother     High Cholesterol Mother     Breast Cancer Mother     Alcohol Abuse Mother     Heart Attack Father     Other Father         COVID    High Blood Pressure Father     High Cholesterol Father     Cancer Maternal Grandmother         breast     Heart Attack Maternal Grandfather     Cancer Paternal Grandmother         \"female region\"    Heart Attack Paternal Grandfather     Cancer Paternal Aunt     Heart Attack Paternal Uncle      A:  Re-administered PHQ-9 and KIRAN-7 (see below). Patient endorses minimal symptoms of depression and subclinical symptoms of anxiety. Denies SI. No change in symptoms of anxiety since previous administration of KIRAN-7 and no change in symptoms of depression since previous administration of PHQ-9. Insight and motivation are good. PHQ Scores 9/21/2022 9/7/2022 8/2/2022 12/6/2021 3/29/2021 10/7/2020   PHQ2 Score 1 0 0 0 0 2   PHQ9 Score 1 0 0 0 0 2     Interpretation of Total Score Depression Severity: 1-4 = Minimal depression, 5-9 = Mild depression, 10-14 = Moderate depression, 15-19 = Moderately severe depression, 20-27 = Severe depression    KIRAN 7 SCORE 9/21/2022 9/7/2022   KIRAN-7 Total Score 2 2     Interpretation of KIRAN-7 score: 5-9 = mild anxiety, 10-14 = moderate anxiety, 15+ = severe anxiety. Recommend referral to behavioral health for scores 10 or greater. Diagnosis:    1. Complex posttraumatic stress disorder          Diagnosis Date    Attention deficit hyperactivity disorder (ADHD), combined type      Plan:  Pt interventions:  Provided education on PTSD symptoms and treatment options for evidence-based treatment (Cognitive Processing Therapy and Prolonged Exposure), Conducted functional assessment, [place]-setting to identify pt's primary goals for GIANNICHRISTEN BAHE[facility] visit / overall health, Supportive techniques, Emphasized self-care as important for managing overall health, and Reviewed options for identifying appropriate providers.     Pt Behavioral Change Plan:   See Pt Instructions

## 2022-09-29 ENCOUNTER — TELEPHONE (OUTPATIENT)
Dept: FAMILY MEDICINE CLINIC | Age: 47
End: 2022-09-29

## 2022-09-29 DIAGNOSIS — F90.2 ATTENTION DEFICIT HYPERACTIVITY DISORDER (ADHD), COMBINED TYPE: ICD-10-CM

## 2022-09-29 RX ORDER — DEXTROAMPHETAMINE SACCHARATE, AMPHETAMINE ASPARTATE MONOHYDRATE, DEXTROAMPHETAMINE SULFATE AND AMPHETAMINE SULFATE 6.25; 6.25; 6.25; 6.25 MG/1; MG/1; MG/1; MG/1
25 CAPSULE, EXTENDED RELEASE ORAL EVERY MORNING
Qty: 30 CAPSULE | Refills: 0 | Status: SHIPPED | OUTPATIENT
Start: 2022-09-29 | End: 2022-11-04 | Stop reason: SDUPTHER

## 2022-09-29 RX ORDER — DEXTROAMPHETAMINE SACCHARATE, AMPHETAMINE ASPARTATE, DEXTROAMPHETAMINE SULFATE AND AMPHETAMINE SULFATE 2.5; 2.5; 2.5; 2.5 MG/1; MG/1; MG/1; MG/1
10 TABLET ORAL DAILY
Qty: 30 TABLET | Refills: 0 | Status: SHIPPED | OUTPATIENT
Start: 2022-09-29 | End: 2022-10-11

## 2022-09-29 NOTE — TELEPHONE ENCOUNTER
Patient contacted the office req a refill on  Adderall XR 25mg Extended Release Caps  Adderall 10mg tabs  Last visit 8/2/22  No future  Rachael calero

## 2022-09-29 NOTE — TELEPHONE ENCOUNTER
Last Office Visit  -  8/2/22  Next Office Visit  -  n/a    Last Filled  -  9/1/22  Last UDS -  n/a  Contract -  n/a

## 2022-10-11 ENCOUNTER — TELEPHONE (OUTPATIENT)
Dept: FAMILY MEDICINE CLINIC | Age: 47
End: 2022-10-11

## 2022-10-11 DIAGNOSIS — F90.9 ATTENTION DEFICIT HYPERACTIVITY DISORDER (ADHD), UNSPECIFIED ADHD TYPE: Primary | ICD-10-CM

## 2022-10-11 RX ORDER — DEXTROAMPHETAMINE SACCHARATE, AMPHETAMINE ASPARTATE, DEXTROAMPHETAMINE SULFATE AND AMPHETAMINE SULFATE 5; 5; 5; 5 MG/1; MG/1; MG/1; MG/1
10 TABLET ORAL DAILY
Qty: 15 TABLET | Refills: 0 | Status: SHIPPED | OUTPATIENT
Start: 2022-10-11 | End: 2022-11-04 | Stop reason: SDUPTHER

## 2022-10-11 NOTE — TELEPHONE ENCOUNTER
Last Office Visit  -  8-2-2022  Next Office Visit  -      Last Filled  -    Last UDS -    Contract -       Pt stated that Adderall 10mg tabs is on extended back order,please advised

## 2022-10-19 ENCOUNTER — TELEPHONE (OUTPATIENT)
Dept: PSYCHOLOGY | Age: 47
End: 2022-10-19

## 2022-10-19 NOTE — TELEPHONE ENCOUNTER
Connected with patient for virtual visit. He noted that he is in [place]. Informed him that visit cannot be completed while he is out of state. Confirmed that he will be in [facility] for an appointment next week.

## 2022-10-28 ENCOUNTER — TELEMEDICINE (OUTPATIENT)
Dept: PSYCHOLOGY | Age: 47
End: 2022-10-28
Payer: OTHER GOVERNMENT

## 2022-10-28 DIAGNOSIS — F43.10 COMPLEX POSTTRAUMATIC STRESS DISORDER: Primary | ICD-10-CM

## 2022-10-28 PROCEDURE — 90832 PSYTX W PT 30 MINUTES: CPT | Performed by: PSYCHOLOGIST

## 2022-10-28 NOTE — PROGRESS NOTES
Behavioral Health Consultation  Brendan Hernandez, Ph.D.  Psychologist  10/28/2022  2:49 PM EDT      Time spent with Patient: 25 minutes  This is patient's third [facility] appointment. Reason for Consult:    Chief Complaint   Patient presents with    Depression    Anxiety     Referring Provider: YUMIKO Garrett - CNP  [address] [address]    Pt provided informed consent for the behavioral health program. Discussed with patient model of service to include the limits of confidentiality (i.e. abuse reporting, suicide intervention, etc.) and short-term intervention focused approach. Pt indicated understanding. Feedback given to PCP. TELEHEALTH VISIT -- Audio/Visual (During FNUGK-28 public health emergency)    Pursuant to the emergency declaration under the [facility], [facility] waiver authority and the [employer] Act, this Virtual Visit was conducted, with patient's consent, to reduce the patient's risk of exposure to COVID-19 and provide continuity of care for an established patient. Services were provided through a video synchronous discussion virtually to substitute for in-person clinic visit. Pt gave verbal informed consent to participate in telehealth services. Conducted a risk-benefit analysis and determined that the patient's presenting problems are consistent with the use of telepsychology. Determined that the patient has sufficient knowledge and skills in the use of technology enabling them to adequately benefit from telepsychology. It was determined that this patient was able to be properly treated without an in-person session. Patient verified that they were currently located at the [facility] address that was provided during registration.     Verified the following information:  Patient's identification: Yes  Patient location: [address] [facility]  Patient's call back number: [phone]   Patient's emergency contact's name and number, as well as permission to contact them if needed: Extended Emergency Contact Information  Primary Emergency Contact: Estrella Miller  Home Phone: [phone]  Relation: Domestic Partner   needed? No     Provider location: [address]:  Patient reported that he has been able to complete a number of personal and professional tasks in the last month. Has been traveling a lot. Admits to experiencing a lot of anxiety, wants to make his parents proud by making good decisions and being successful. 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 He has been exercising more regularly again. Has been more mindful about what he is eating. Has significantly reduced his alcohol use in the last month. He has been using melatonin at night and notes a significant improvement in his sleep. Processed ways he wants to protect work/life balance. O:  MSE:    Appearance: good hygiene   Attitude: cooperative and friendly  Consciousness: alert  Orientation: oriented to person, place, time, general circumstance  Memory: recent and remote memory intact  Attention/Concentration: intact during session  Psychomotor Activity:normal  Eye Contact: normal  Speech: normal rate and volume, well-articulated  Mood: anxiety  Affect: euthymic  Perception: within normal limits  Thought Content: within normal limits  Thought Process: logical, coherent and goal-directed  Insight: good  Judgment: intact  Ability to understand instructions: Yes  Ability to respond meaningfully: Yes  Morbid Ideation: no   Suicide Assessment: no suicidal ideation, plan, or intent  Homicidal Ideation: no    History:    Medications:   Current Outpatient Medications   Medication Sig Dispense Refill    amphetamine-dextroamphetamine (ADDERALL, 20MG,) 20 MG tablet Take 0.5 tablets by mouth daily for 30 days.  15 tablet 0    amphetamine-dextroamphetamine (ADDERALL XR) 25 MG extended release capsule Take 1 capsule by mouth every morning for 30 days. 30 capsule 0    FLUoxetine (PROZAC) 20 MG capsule Take 1 capsule by mouth in the morning. 30 capsule 5    busPIRone (BUSPAR) 5 MG tablet TAKE 1 TABLET BY MOUTH THREE TIMES DAILY AS NEEDED FOR ANXIETY OR STRESS. DO NOT EXCEED 60 MG DAILY       No current facility-administered medications for this visit. Social History:   Social History     Socioeconomic History    Marital status: Single     Spouse name: Not on file    Number of children: Not on file    Years of education: Not on file    Highest education level: Master's degree (e.g., MA, MS, Alan, MEd, MSW, SAMSON)   Occupational History    Not on file   Tobacco Use    Smoking status: Never    Smokeless tobacco: Never   Vaping Use    Vaping Use: Never used   Substance and Sexual Activity    Alcohol use: Not Currently    Drug use: Never    Sexual activity: Yes   Other Topics Concern    Not on file   Social History Narrative    Not on file     Social Determinants of Health     Financial Resource Strain: Not on file   Food Insecurity: Not on file   Transportation Needs: Not on file   Physical Activity: Not on file   Stress: Not on file   Social Connections: Not on file   Intimate Partner Violence: Not on file   Housing Stability: Not on file     TOBACCO:   reports that he has never smoked. He has never used smokeless tobacco.  ETOH:   reports that he does not currently use alcohol.   Family History:   Family History   Problem Relation Age of Onset    Heart Attack Mother     High Blood Pressure Mother     High Cholesterol Mother     Breast Cancer Mother     Alcohol Abuse Mother     Heart Attack Father     Other Father         COVID    High Blood Pressure Father     High Cholesterol Father     Cancer Maternal Grandmother         breast     Heart Attack Maternal Grandfather     Cancer Paternal Grandmother         \"female region\"    Heart Attack Paternal Grandfather     Cancer Paternal Aunt Heart Attack Paternal Uncle      A:  Patient engaged and cooperative. Denies SI. Insight and motivation are good. Diagnosis:    1. Complex posttraumatic stress disorder          Diagnosis Date    Attention deficit hyperactivity disorder (ADHD), combined type      Plan:  Pt interventions:  Conducted functional assessment, [place]-setting to identify pt's primary goals for [facility]ICHARD[facility]CCHRISTEN BAHENA [facility] visit / overall health, Supportive techniques, and Emphasized self-care as important for managing overall health.     Pt Behavioral Change Plan:   See Pt Instructions

## 2022-11-04 DIAGNOSIS — F90.2 ATTENTION DEFICIT HYPERACTIVITY DISORDER (ADHD), COMBINED TYPE: ICD-10-CM

## 2022-11-04 DIAGNOSIS — F90.9 ATTENTION DEFICIT HYPERACTIVITY DISORDER (ADHD), UNSPECIFIED ADHD TYPE: ICD-10-CM

## 2022-11-04 RX ORDER — DEXTROAMPHETAMINE SACCHARATE, AMPHETAMINE ASPARTATE, DEXTROAMPHETAMINE SULFATE AND AMPHETAMINE SULFATE 5; 5; 5; 5 MG/1; MG/1; MG/1; MG/1
10 TABLET ORAL DAILY
Qty: 15 TABLET | Refills: 0 | Status: SHIPPED | OUTPATIENT
Start: 2022-11-10 | End: 2022-12-10

## 2022-11-04 RX ORDER — DEXTROAMPHETAMINE SACCHARATE, AMPHETAMINE ASPARTATE MONOHYDRATE, DEXTROAMPHETAMINE SULFATE AND AMPHETAMINE SULFATE 6.25; 6.25; 6.25; 6.25 MG/1; MG/1; MG/1; MG/1
25 CAPSULE, EXTENDED RELEASE ORAL EVERY MORNING
Qty: 30 CAPSULE | Refills: 0 | Status: SHIPPED | OUTPATIENT
Start: 2022-11-10 | End: 2022-12-10

## 2022-11-04 NOTE — TELEPHONE ENCOUNTER
Call from patient requesting refill on    amphetamine-dextroamphetamine (ADDERALL XR) 25 MG extended release capsule    amphetamine-dextroamphetamine (ADDERALL, 20MG,) 20 MG tablet     Patient is requesting the meds to be called in on a date when he can  both at the same time. He would like to coordinate same  date for the mediations.      [address] Office Visit  -  8.2.22 NB  Next Office Visit  -

## 2022-11-11 ENCOUNTER — TELEMEDICINE (OUTPATIENT)
Dept: PSYCHOLOGY | Age: 47
End: 2022-11-11
Payer: OTHER GOVERNMENT

## 2022-11-11 DIAGNOSIS — F43.10 COMPLEX POSTTRAUMATIC STRESS DISORDER: Primary | ICD-10-CM

## 2022-11-11 DIAGNOSIS — F90.9 ATTENTION DEFICIT HYPERACTIVITY DISORDER (ADHD), UNSPECIFIED ADHD TYPE: ICD-10-CM

## 2022-11-11 PROCEDURE — 90832 PSYTX W PT 30 MINUTES: CPT | Performed by: PSYCHOLOGIST

## 2022-11-11 NOTE — PROGRESS NOTES
Behavioral Health Consultation  Isaiah Farris, Ph.D.  Psychologist  11/11/2022  11:49 AM EST      Time spent with Patient: 20 minutes  This is patient's fourth [facility] appointment. Reason for Consult:    Chief Complaint   Patient presents with    Depression     Referring Provider: YUMIKO Baez - CNP  [address] [address]    Pt provided informed consent for the behavioral health program. Discussed with patient model of service to include the limits of confidentiality (i.e. abuse reporting, suicide intervention, etc.) and short-term intervention focused approach. Pt indicated understanding. Feedback given to PCP. TELEHEALTH VISIT -- Audio/Visual (During SEVK-31 public health emergency)    Pursuant to the emergency declaration under the [address] [place] [address], [facility] waiver authority and the [employer] Act, this Virtual Visit was conducted, with patient's consent, to reduce the patient's risk of exposure to COVID-19 and provide continuity of care for an established patient. Services were provided through a video synchronous discussion virtually to substitute for in-person clinic visit. Pt gave verbal informed consent to participate in telehealth services. Conducted a risk-benefit analysis and determined that the patient's presenting problems are consistent with the use of telepsychology. Determined that the patient has sufficient knowledge and skills in the use of technology enabling them to adequately benefit from telepsychology. It was determined that this patient was able to be properly treated without an in-person session. Patient verified that they were currently located at the [facility] address that was provided during registration.     Verified the following information:  Patient's identification: Yes  Patient location: [address])   Patient's call back number: [phone]   Patient's emergency contact's name and number, as well as permission to contact them if needed: Extended Emergency Contact Information  Primary Emergency Contact: Amelia Sow  Home Phone: [phone]  Relation: Domestic Partner   needed? No     Provider location: [address]:  Discussed treatment planning with patient. Noted that he tends to move at a faster pace than others, is \"thinking ahead\" most of the time. He identifies this as a barrier to connecting with others. Admits that staying busy is a way to dissociate. He also discussed how this was strategy that helped him assimilate to [employer] culture, where he felt that it was not OK to express emotion. Currently, he notes impairments in relationships at work and in his personal life. He has received feedback at work that he comes across as [place]. He admits to avoiding negative conversations with others. Discussed contrast between his choice to have a positive outlook and the traumatic experiences he has endured. He expressed a desire to manage his life in more of the \"middle ground. \" Discussed specific goals: managing fight/flight response. Also processed his grief for his parents, ways that he has been struggling with their loss. As he goes through their belongings, he notes questions that he wishes he could talk about with them. Reviewed radical acceptance as a strategy for managing his thoughts. Also recommended grounding and mindfulness meditation.      O:  MSE:    Appearance: good hygiene   Attitude: cooperative and friendly  Consciousness: alert  Orientation: oriented to person, place, time, general circumstance  Memory: recent and remote memory intact  Attention/Concentration: intact during session  Psychomotor Activity:normal  Eye Contact: normal  Speech: normal rate and volume, well-articulated  Mood: anxious  Affect: anxious  Perception: within normal limits  Thought Content: all-or-none thinking and intrusive thoughts  Thought Process: logical, coherent and goal-directed  Insight: good  Judgment: intact  Ability to understand instructions: Yes  Ability to respond meaningfully: Yes  Morbid Ideation: no   Suicide Assessment: no suicidal ideation, plan, or intent  Homicidal Ideation: no    History:    Medications:   Current Outpatient Medications   Medication Sig Dispense Refill    amphetamine-dextroamphetamine (ADDERALL, 20MG,) 20 MG tablet Take 0.5 tablets by mouth daily for 30 days. 15 tablet 0    amphetamine-dextroamphetamine (ADDERALL XR) 25 MG extended release capsule Take 1 capsule by mouth every morning for 30 days. 30 capsule 0    FLUoxetine (PROZAC) 20 MG capsule Take 1 capsule by mouth in the morning. 30 capsule 5    busPIRone (BUSPAR) 5 MG tablet TAKE 1 TABLET BY MOUTH THREE TIMES DAILY AS NEEDED FOR ANXIETY OR STRESS. DO NOT EXCEED 60 MG DAILY       No current facility-administered medications for this visit. Social History:   Social History     Socioeconomic History    Marital status: Single     Spouse name: Not on file    Number of children: Not on file    Years of education: Not on file    Highest education level: Master's degree (e.g., MA, MS, Alan, MEd, MSW, SAMSON)   Occupational History    Not on file   Tobacco Use    Smoking status: Never    Smokeless tobacco: Never   Vaping Use    Vaping Use: Never used   Substance and Sexual Activity    Alcohol use: Not Currently    Drug use: Never    Sexual activity: Yes   Other Topics Concern    Not on file   Social History Narrative    Not on file     Social Determinants of Health     Financial Resource Strain: Not on file   Food Insecurity: Not on file   Transportation Needs: Not on file   Physical Activity: Not on file   Stress: Not on file   Social Connections: Not on file   Intimate Partner Violence: Not on file   Housing Stability: Not on file     TOBACCO:   reports that he has never smoked.  He has never used smokeless tobacco.  ETOH:   reports that he does not currently use alcohol. Family History:   Family History   Problem Relation Age of Onset    Heart Attack Mother     High Blood Pressure Mother     High Cholesterol Mother     Breast Cancer Mother     Alcohol Abuse Mother     Heart Attack Father     Other Father         COVID    High Blood Pressure Father     High Cholesterol Father     Cancer Maternal Grandmother         breast     Heart Attack Maternal Grandfather     Cancer Paternal Grandmother         \"female region\"    Heart Attack Paternal Grandfather     Cancer Paternal Aunt     Heart Attack Paternal Uncle      A:  Patient engaged and cooperative. Denies SI. Insight and motivation are good. Diagnosis:    1. Complex posttraumatic stress disorder    2. Attention deficit hyperactivity disorder (ADHD), unspecified ADHD type          Diagnosis Date    Attention deficit hyperactivity disorder (ADHD), combined type      Plan:  Pt interventions:  Trained in strategies for increasing balanced thinking, Conducted functional assessment, [place]-setting to identify pt's primary goals for [facility] visit / overall health, Supportive techniques, Emphasized self-care as important for managing overall health, and Identified relevant behavioral strategies for targeting anxiety including mindfulness and grounding.     Pt Behavioral Change Plan:   See Pt Instructions

## 2022-11-11 NOTE — PATIENT INSTRUCTIONS
Consider adopting a mindfulness practice. You could search the internet for recordings of a practice called the \"body scan,\" or go for a short walk and pay attention to all of the details in your surrounding. You can also find more information about mindfulness online. Return to see Dr. Nicole Bañuelos in 2 weeks. One of the four options you have for any problem is \"radical acceptance\" (Carina Soto). Radical acceptance is about accepting life on lifes terms and not resisting what you cannot or choose not to change. Radical acceptance is about saying yes to life, just as it is. Imagine that you talk with an  about leasing an apartment in a popular complex that is completely full. He agrees to call you when the two-bedroom apartment is available. You wait for months, then stop by to check with him. When you arrive, he is signing a lease agreement with a couple for a two-bedroom unit. When you confront him, he shrugs. That shouldnt happen. It isnt fair. And it did happen. The pain is the loss of an apartment that you really wanted. You may feel sad and hurt. Suffering is what you do with that pain and the interpretation you put on the pain. Suffering is optional; pain is not. Its difficult to accept what you dont want to be true. And its more difficult to not accept. Not accepting pain brings suffering. Refusing to [address] [address] often say, I cant stand this, This isnt fair, This cant be true, and It shouldnt be this way.  Its almost as if we think refusing to accept the truth will keep it from being true, or that accepting means agreeing. Accepting doesnt mean agreeing. Its exhausting to fight reality, and it doesnt work.  Refusing to accept that you were fired for something you didnt do, that your friend cheated you, or that you werent accepted into the college you wanted to attend doesnt change the situation, and it adds to the pain you experience. Accepting reality is difficult when life is painful. No one wants to experience pain, disappointment, sadness, or loss. But those experiences are a part of life. When you attempt to avoid or resist those emotions, you add suffering to your pain. You may build the emotion bigger with your thoughts or create more misery by attempting to avoid the painful emotions. You can stop suffering by practicing acceptance. Life is full of experiences, some that you enjoy and others you dislike. When you push away or attempt to avoid feelings of sadness and pain, you also diminish your ability to feel tamy. Avoidance of emotions often leads to depression and anxiety. Avoidance can also lead to destructive behaviors, such as gambling, drinking too much, overspending, eating too little or too much, and overworking. These behaviors may help avoid pain in the short run, but they only make the situation worse in the long run. Acceptance means that you can turn your resistant ruminating into accepting thoughts like, Im in this situation. I dont approve of it. I dont think its OK, but it is what it is, and I cant change that it happened.     Imagine that you are late for an important job interview. Traffic is especially congested, and you are stopped at red light after red light. Raging at the traffic lights or the drivers in front of you will not help you get to your destination sooner and will only add to your upset. Accepting the situation and doing the best you can will be less emotionally painful, and likely more effective. With acceptance you will arrive at your interview less distressed and perhaps better able to manage the situation. MINDFULNESS    Mindfulness means paying attention in a particular way: on purpose, in the present moment, and non-judgmentally.    -Radha Katz    \"Mindfulness is the basic human ability to be fully present, aware of where we are and what were doing, and not overly reactive or overwhelmed by whats going on around us. \"   -Mindful De Peyster    Paying attention on purpose  Mindfulness involves paying attention on purpose. Mindfulness involves a conscious direction of our awareness. This can mean purposefully directing our attention to the breath, or a particular emotion, or an activity as simple as eating. Doing so allows us to actively shape the mind. Paying attention in the present moment  Left to itself, the mind wanders through all kinds of thoughts -- including thoughts expressing anger, craving, depression, self-pity, and anxiety. As we indulge in these kinds of thoughts, we reinforce those emotions and cause ourselves to suffer. These thoughts usually center around the past or future. But the past no longer exists. The future is just a fantasy until it happens. The one moment we actually can experience -- the present moment -- is the one we seem most to avoid. By purposefully directing our awareness away from thoughts about the past or future and instead towards the [address] - our present moment experience - we decrease the effect of these thoughts on our lives. Paying attention non-judgmentally  Mindfulness is an emotionally non-reactive state. We don't  that this experience is good and that one is bad. Or, if we do make those judgments, we dont get upset in reaction to our experience. We simply notice it arising, observe it mindfully, and allow it to pass through us. When practicing mindfulness, we may be aware that certain experiences are pleasant and some are unpleasant, but on an emotional level we dont react. Resources  \"Wherever You Go, There You Are: Mindfulness Meditation in Everyday Life\" - by Charles Rebolledo of Mindfulness:  An Introduction to the Practice of Meditation\" - by Eloisa Teague  \"The Mindfulness Solution: Everyday Practices for Everyday Problems\" - by Usman Adame    Ways to Practice Mindfulness  Pay attention to your breathing  Take a mindful walk  Eat mindfully  Ground yourself in your five senses  Journal  Try dishwashing, cleaning and doing laundry a little bit slower than you usually do  Meditate    Grounding  Grounding is a technique that helps keep you focused on the present moment by reorienting you to reality in the here-and-now. Grounding skills can be helpful in managing overwhelming feelings or intense anxiety. They help you to regain your mental focus from an often intensely emotional state. Grounding skills occur within two specific approaches:   Sensory Awareness and Cognitive Awareness    1. Sensory Awareness (awareness of senses)    Grounding Exercise #1:   Keep your eyes open, look around the room, notice your surroundings, notice  details. Hold a pillow, stuffed animal or a ball. Place a cool cloth or hold something cool (e.g., can of soda) on your forehead or the inside of your wrist   Listen to soothing music   Put your feet firmly on the ground   FOCUS on someones voice or a neutral conversation. Grounding Exercise #2:   The [id number] Exercise   Name 5 things you can see in the room with you. Name 4 things you can feel Wyline Graver on my back or feet on floor)   Name 3 things you can hear right now (fingers tapping on keyboard or tv)   Name 2 things you can smell right now (or, 2 things you like the smell of)   Name 1 good thing about yourself    Grounding Exercise #3  5/5/5 Grounding Exercise  What are 5 things you can see? What are 5 things you can feel? What are 5 things you can hear?    -Repeat with 4 different observations for each, 3 different for each, 2 different for each, etc.   -If you get to 1 and are still feeling anxious, re-start at 5 with 5 different things you can see. 2. Cognitive Awareness (awareness of thoughts)    Grounding Exercise #4: Re-orient yourself in place and time by asking yourself some or all of these questions:  1. Where am I?  2. What is today?   3. What is the date? 4. What is the month? 5. What is the year? 6. How old am I?  7. What season is it?

## 2022-11-21 ENCOUNTER — TELEMEDICINE (OUTPATIENT)
Dept: PSYCHOLOGY | Age: 47
End: 2022-11-21
Payer: OTHER GOVERNMENT

## 2022-11-21 DIAGNOSIS — F90.9 ATTENTION DEFICIT HYPERACTIVITY DISORDER (ADHD), UNSPECIFIED ADHD TYPE: ICD-10-CM

## 2022-11-21 DIAGNOSIS — F43.10 COMPLEX POSTTRAUMATIC STRESS DISORDER: Primary | ICD-10-CM

## 2022-11-21 PROCEDURE — 90832 PSYTX W PT 30 MINUTES: CPT | Performed by: PSYCHOLOGIST

## 2022-11-21 NOTE — PROGRESS NOTES
Behavioral Health Consultation  Nhung Rosa, Ph.D.  Psychologist  11/21/2022  2:35 PM EST      Time spent with Patient: 25 minutes  This is patient's fifth [facility] appointment. Reason for Consult:    Chief Complaint   Patient presents with    Depression    Anxiety     Referring Provider: YUMIKO Coleman - CNP  [address] [address]    Pt provided informed consent for the behavioral health program. Discussed with patient model of service to include the limits of confidentiality (i.e. abuse reporting, suicide intervention, etc.) and short-term intervention focused approach. Pt indicated understanding. Feedback given to PCP. TELEHEALTH VISIT -- Audio/Visual (During [facility]-52 public health emergency)    Pursuant to the emergency declaration under the [address] [place] [address], Community [facility] waiver authority and the [employer] and [employer] Act, this Virtual Visit was conducted, with patient's consent, to reduce the patient's risk of exposure to COVID-19 and provide continuity of care for an established patient. Services were provided through a video synchronous discussion virtually to substitute for in-person clinic visit. Pt gave verbal informed consent to participate in telehealth services. Conducted a risk-benefit analysis and determined that the patient's presenting problems are consistent with the use of telepsychology. Determined that the patient has sufficient knowledge and skills in the use of technology enabling them to adequately benefit from telepsychology. It was determined that this patient was able to be properly treated without an in-person session. Patient verified that they were currently located at the [facility] address that was provided during registration.     Verified the following information:  Patient's identification: Yes  Patient location: [place]   Patient's call back number: [phone]   Patient's emergency contact's name and number, as well as permission to contact them if needed: Extended Emergency Contact Information  Primary Emergency Contact: Mae Prieto  Home Phone: [phone]  Relation: Domestic Partner   needed? No     Provider location: [address]:  Patient reported that his father's birthday was last week, and he had a very difficult time managing his stress that day. Processed relationship dynamics with his romantic partner. Patient also acknowledged work-related stress. He has been struggling with staying mindful, present-focused. Does not feel like he is able to balance work and his personal life. Processed ways he is grieving his parents this time of year. Also discussed intrusive, all-or-nothing thinking. O:  MSE:    Appearance: good hygiene   Attitude: cooperative and friendly  Consciousness: alert  Orientation: oriented to person, place, time, general circumstance  Memory: recent and remote memory intact  Attention/Concentration: intact during session  Psychomotor Activity:normal  Eye Contact: normal  Speech: normal rate and volume, well-articulated  Mood: stressed, overwhelmed  Affect: irritable  Perception: within normal limits  Thought Content: all-or-none thinking and intrusive thoughts  Thought Process: logical, coherent and goal-directed  Insight: good  Judgment: intact  Ability to understand instructions: Yes  Ability to respond meaningfully: Yes  Morbid Ideation: no   Suicide Assessment: no suicidal ideation, plan, or intent  Homicidal Ideation: no    History:    Medications:   Current Outpatient Medications   Medication Sig Dispense Refill    amphetamine-dextroamphetamine (ADDERALL, 20MG,) 20 MG tablet Take 0.5 tablets by mouth daily for 30 days. 15 tablet 0    amphetamine-dextroamphetamine (ADDERALL XR) 25 MG extended release capsule Take 1 capsule by mouth every morning for 30 days.  30 capsule 0    FLUoxetine (PROZAC) 20 MG capsule Take 1 capsule by mouth in the morning. 30 capsule 5    busPIRone (BUSPAR) 5 MG tablet TAKE 1 TABLET BY MOUTH THREE TIMES DAILY AS NEEDED FOR ANXIETY OR STRESS. DO NOT EXCEED 60 MG DAILY       No current facility-administered medications for this visit. Social History:   Social History     Socioeconomic History    Marital status: Single     Spouse name: Not on file    Number of children: Not on file    Years of education: Not on file    Highest education level: Master's degree (e.g., MA, MS, Alan, MEd, MSW, SAMSON)   Occupational History    Not on file   Tobacco Use    Smoking status: Never    Smokeless tobacco: Never   Vaping Use    Vaping Use: Never used   Substance and Sexual Activity    Alcohol use: Not Currently    Drug use: Never    Sexual activity: Yes   Other Topics Concern    Not on file   Social History Narrative    Not on file     Social Determinants of Health     Financial Resource Strain: Not on file   Food Insecurity: Not on file   Transportation Needs: Not on file   Physical Activity: Not on file   Stress: Not on file   Social Connections: Not on file   Intimate Partner Violence: Not on file   Housing Stability: Not on file     TOBACCO:   reports that he has never smoked. He has never used smokeless tobacco.  ETOH:   reports that he does not currently use alcohol. Family History:   Family History   Problem Relation Age of Onset    Heart Attack Mother     High Blood Pressure Mother     High Cholesterol Mother     Breast Cancer Mother     Alcohol Abuse Mother     Heart Attack Father     Other Father         COVID    High Blood Pressure Father     High Cholesterol Father     Cancer Maternal Grandmother         breast     Heart Attack Maternal Grandfather     Cancer Paternal Grandmother         \"female region\"    Heart Attack Paternal Grandfather     Cancer Paternal Aunt     Heart Attack Paternal Uncle      A:  Patient engaged and cooperative. Denies SI. Insight and motivation are good. Diagnosis:    1.  Complex posttraumatic stress disorder    2. Attention deficit hyperactivity disorder (ADHD), unspecified ADHD type          Diagnosis Date    Attention deficit hyperactivity disorder (ADHD), combined type      Plan:  Pt interventions:  Conducted functional assessment, [place]-setting to identify pt's primary goals for [facility] visit / overall health, Supportive techniques, Emphasized self-care as important for managing overall health, and Identified maladaptive thoughts.     Pt Behavioral Change Plan:   See Pt Instructions

## 2022-11-21 NOTE — PATIENT INSTRUCTIONS
A Letter of Self-Compassion  Youre going to start with choosing an aspect of yourself that you dislike and criticize. It may be an appearance, career, relationships, health, etc.  Write in detail about how this perceived inadequacy makes you feel. What thoughts, images, emotions, or stories come up when you think about it? Next, imagine someone who is unconditionally loving, accepting, and supportive. This friend sees your strengths and opportunities for growth, including the negative aspects about you. The friend accepts and forgives, embracing you kindly just as you are. Now write a letter to yourself from the perspective of this kind friend. What does he or she say to you? How does this friend encourage and support you in taking steps to change? Let the words flow and dont stress about structure or phrasing. After fully drafting the letter, put it aside for fifteen minutes. Then return to the letter and reread it. Let the words sink in. Feel the encouragement, support, compassion, and acceptance. Review the letter whenever you are feeling down about this aspect and remember that accepting yourself is the first step to change.

## 2022-12-05 ENCOUNTER — TELEMEDICINE (OUTPATIENT)
Dept: PSYCHOLOGY | Age: 47
End: 2022-12-05

## 2022-12-05 DIAGNOSIS — F43.10 COMPLEX POSTTRAUMATIC STRESS DISORDER: Primary | ICD-10-CM

## 2022-12-05 NOTE — PROGRESS NOTES
Behavioral Health Consultation  Shae Uribe, Ph.D.  Psychologist  12/5/2022  1:05 PM EST      Time spent with Patient: 30 minutes  This is patient's sixth [facility] appointment. Reason for Consult:    Chief Complaint   Patient presents with    Depression    Anxiety     Referring Provider: Ebonie Kimball, APRN - CNP  [address] [address]    Pt provided informed consent for the behavioral health program. Discussed with patient model of service to include the limits of confidentiality (i.e. abuse reporting, suicide intervention, etc.) and short-term intervention focused approach. Pt indicated understanding. Feedback given to PCP. TELEHEALTH VISIT -- Audio/Visual (During [facility]-65 public health emergency)    Pursuant to the emergency declaration under the [address] [place] [address], [facility] waiver authority and the [employer] and [employer] Act, this Virtual Visit was conducted, with patient's consent, to reduce the patient's risk of exposure to COVID-19 and provide continuity of care for an established patient. Services were provided through a video synchronous discussion virtually to substitute for in-person clinic visit. Pt gave verbal informed consent to participate in telehealth services. Conducted a risk-benefit analysis and determined that the patient's presenting problems are consistent with the use of telepsychology. Determined that the patient has sufficient knowledge and skills in the use of technology enabling them to adequately benefit from telepsychology. It was determined that this patient was able to be properly treated without an in-person session. Patient verified that they were currently located at the [facility] address that was provided during registration.     Verified the following information:  Patient's identification: Yes  Patient location: [address] [address]   Patient's call back number: [phone]   Patient's emergency contact's name and number, as well as permission to contact them if needed: Extended Emergency Contact Information  Primary Emergency Contact: Ambreen Esparza  Home Phone: [phone]  Relation: Domestic Partner   needed? 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 Processed a self-compassion exercise he completed since last visit. Noted feeling pressure to be \"perfect\" in order to please his grandparents, stand out in a large family. Described a new sense of closure after visiting [place] this weekend and making final preparations to rent his family's properties. Patient described feeling tired. \"I can't sleep enough. \" Appetite is variable. O:  MSE:    Appearance: good hygiene   Attitude: cooperative and friendly  Consciousness: alert  Orientation: oriented to person, place, time, general circumstance  Memory: recent and remote memory intact  Attention/Concentration: intact during session  Psychomotor Activity:normal  Eye Contact: normal  Speech: normal rate and volume, well-articulated  Mood: \"good\"  Affect: euthymic  Perception: within normal limits  Thought Content: within normal limits  Thought Process: logical, coherent and goal-directed  Insight: good  Judgment: intact  Ability to understand instructions: Yes  Ability to respond meaningfully: Yes  Morbid Ideation: no   Suicide Assessment: no suicidal ideation, plan, or intent  Homicidal Ideation: no    History:    Medications:   Current Outpatient Medications   Medication Sig Dispense Refill    amphetamine-dextroamphetamine (ADDERALL, 20MG,) 20 MG tablet Take 0.5 tablets by mouth daily for 30 days. 15 tablet 0    amphetamine-dextroamphetamine (ADDERALL XR) 25 MG extended release capsule Take 1 capsule by mouth every morning for 30 days.  30 capsule 0    FLUoxetine (PROZAC) 20 MG capsule Take 1 capsule by mouth in the morning. 30 capsule 5    busPIRone (BUSPAR) 5 MG tablet TAKE 1 TABLET BY MOUTH THREE TIMES DAILY AS NEEDED FOR ANXIETY OR STRESS. DO NOT EXCEED 60 MG DAILY       No current facility-administered medications for this visit. Social History:   Social History     Socioeconomic History    Marital status: Single     Spouse name: Not on file    Number of children: Not on file    Years of education: Not on file    Highest education level: Master's degree (e.g., MA, MS, Alan, MEd, MSW, SAMSON)   Occupational History    Not on file   Tobacco Use    Smoking status: Never    Smokeless tobacco: Never   Vaping Use    Vaping Use: Never used   Substance and Sexual Activity    Alcohol use: Not Currently    Drug use: Never    Sexual activity: Yes   Other Topics Concern    Not on file   Social History Narrative    Not on file     Social Determinants of Health     Financial Resource Strain: Not on file   Food Insecurity: Not on file   Transportation Needs: Not on file   Physical Activity: Not on file   Stress: Not on file   Social Connections: Not on file   Intimate Partner Violence: Not on file   Housing Stability: Not on file     TOBACCO:   reports that he has never smoked. He has never used smokeless tobacco.  ETOH:   reports that he does not currently use alcohol. Family History:   Family History   Problem Relation Age of Onset    Heart Attack Mother     High Blood Pressure Mother     High Cholesterol Mother     Breast Cancer Mother     Alcohol Abuse Mother     Heart Attack Father     Other Father         COVID    High Blood Pressure Father     High Cholesterol Father     Cancer Maternal Grandmother         breast     Heart Attack Maternal Grandfather     Cancer Paternal Grandmother         \"female region\"    Heart Attack Paternal Grandfather     Cancer Paternal Aunt     Heart Attack Paternal Uncle      A:  Patient engaged and cooperative. Denies SI. Insight and motivation are good. Diagnosis:    1.  Complex posttraumatic stress disorder          Diagnosis Date    Attention deficit hyperactivity disorder (ADHD), combined type      Plan:  Pt interventions:  Conducted functional assessment, [place]-setting to identify pt's primary goals for [facility] visit / overall health, Supportive techniques, Emphasized self-care as important for managing overall health, and Identified maladaptive thoughts.     Pt Behavioral Change Plan:   See Pt Instructions

## 2022-12-08 ENCOUNTER — TELEMEDICINE (OUTPATIENT)
Dept: FAMILY MEDICINE CLINIC | Age: 47
End: 2022-12-08
Payer: OTHER GOVERNMENT

## 2022-12-08 DIAGNOSIS — F90.9 ATTENTION DEFICIT HYPERACTIVITY DISORDER (ADHD), UNSPECIFIED ADHD TYPE: Primary | ICD-10-CM

## 2022-12-08 PROBLEM — H35.40 PERIPHERAL RETINAL DEGENERATION OF BOTH EYES: Status: ACTIVE | Noted: 2022-12-08

## 2022-12-08 PROCEDURE — 99213 OFFICE O/P EST LOW 20 MIN: CPT | Performed by: NURSE PRACTITIONER

## 2022-12-08 RX ORDER — DEXTROAMPHETAMINE SACCHARATE, AMPHETAMINE ASPARTATE MONOHYDRATE, DEXTROAMPHETAMINE SULFATE AND AMPHETAMINE SULFATE 6.25; 6.25; 6.25; 6.25 MG/1; MG/1; MG/1; MG/1
25 CAPSULE, EXTENDED RELEASE ORAL EVERY MORNING
Qty: 30 CAPSULE | Refills: 0 | Status: SHIPPED | OUTPATIENT
Start: 2022-12-08 | End: 2023-01-07

## 2022-12-08 RX ORDER — DEXTROAMPHETAMINE SACCHARATE, AMPHETAMINE ASPARTATE, DEXTROAMPHETAMINE SULFATE AND AMPHETAMINE SULFATE 5; 5; 5; 5 MG/1; MG/1; MG/1; MG/1
20 TABLET ORAL DAILY
Qty: 30 TABLET | Refills: 0 | Status: SHIPPED | OUTPATIENT
Start: 2022-12-08 | End: 2023-01-07

## 2022-12-08 NOTE — PROGRESS NOTES
Oksana Gama is a 52 y.o. male evaluated via telephone on 12/8/2022 for Medication Refill (Would like 20 mg to be a whole tablet instead of a half tablet now )  . Documentation:  I communicated with the patient and/or health care decision maker about ADHD:  Adderall 25mg XR in the morning  Adderall 20mg in the afternoon, tolerating well- no trouble with eating or sleeping. Details of this discussion including any medical advice provided: 1. Attention deficit hyperactivity disorder (ADHD), unspecified ADHD type    - amphetamine-dextroamphetamine (ADDERALL, 20MG,) 20 MG tablet; Take 1 tablet by mouth daily for 30 days. Dispense: 30 tablet; Refill: 0  - amphetamine-dextroamphetamine (ADDERALL XR) 25 MG extended release capsule; Take 1 capsule by mouth every morning for 30 days. Dispense: 30 capsule; Refill: 0      Total Time: not billed by time    Oksana Gama was evaluated through a synchronous (real-time) audio encounter. Patient identification was verified at the start of the visit. He (or guardian if applicable) is aware that this is a billable service, which includes applicable co-pays. This visit was conducted with the patient's (and/or legal guardian's) verbal consent. He has not had a related appointment within my department in the past 7 days or scheduled within the next 24 hours. The patient was located at Home: [address] [address]. The provider was located at [facility] (Appt Dept[facility]     Note: not billable if this call serves to triage the patient into an appointment for the relevant concern    YUMIKO Coleman - CNP

## 2022-12-14 ENCOUNTER — TELEMEDICINE (OUTPATIENT)
Dept: PSYCHOLOGY | Age: 47
End: 2022-12-14

## 2022-12-14 DIAGNOSIS — F43.10 COMPLEX POSTTRAUMATIC STRESS DISORDER: Primary | ICD-10-CM

## 2022-12-14 NOTE — PROGRESS NOTES
Behavioral Health Consultation  Rosa Vera, Ph.D.  Psychologist  12/14/2022  10:08 AM EST      Time spent with Patient: 10 minutes (no billing due to time)  This is patient's sixth [facility] appointment. Reason for Consult:    Chief Complaint   Patient presents with    Depression    Anxiety     Referring Provider: YUMIKO Gurrola CNP  [address] [address]    Pt provided informed consent for the behavioral health program. Discussed with patient model of service to include the limits of confidentiality (i.e. abuse reporting, suicide intervention, etc.) and short-term intervention focused approach. Pt indicated understanding. Feedback given to PCP. TELEHEALTH VISIT -- Audio/Visual (During OORMR-36 public health emergency)    Pursuant to the emergency declaration under the [facility], 1135 waiver authority and the [employer] Act, this Virtual Visit was conducted, with patient's consent, to reduce the patient's risk of exposure to COVID-19 and provide continuity of care for an established patient. Services were provided through a video synchronous discussion virtually to substitute for in-person clinic visit. Pt gave verbal informed consent to participate in telehealth services. Conducted a risk-benefit analysis and determined that the patient's presenting problems are consistent with the use of telepsychology. Determined that the patient has sufficient knowledge and skills in the use of technology enabling them to adequately benefit from telepsychology. It was determined that this patient was able to be properly treated without an in-person session. Patient verified that they were currently located at the [facility] address that was provided during registration.     Verified the following information:  Patient's identification: Yes  Patient location: [facility]  Patient's call back number: [phone]   Patient's emergency contact's name and number, as well as permission to contact them if needed: Extended Emergency Contact Information  Primary Emergency Contact: Crissy Banuelos  Home Phone: [phone]  Relation: Domestic Partner   needed? No     Provider location: [address]:  Patient noted that he is dealing with a crisis at work and will be unable to complete today's full appointment. He noted that he is having difficulty with new realizations about his partner. He feels that their relationship is unhealthy. Feels angry and her and himself. Notes that these realizations have been \"heavy\" on his mind. O:  MSE:    Appearance: good hygiene   Attitude: cooperative and friendly  Consciousness: alert  Orientation: oriented to person, place, time, general circumstance  Memory: recent and remote memory intact  Attention/Concentration: intact during session  Psychomotor Activity:normal  Eye Contact: normal  Speech: normal rate and volume, well-articulated  Mood: frustrated, guilty  Affect: euthymic  Perception: within normal limits  Thought Content: within normal limits  Thought Process: logical, coherent and goal-directed  Insight: good  Judgment: intact  Ability to understand instructions: Yes  Ability to respond meaningfully: Yes  Morbid Ideation: no   Suicide Assessment: no suicidal ideation, plan, or intent  Homicidal Ideation: no    History:    Medications:   Current Outpatient Medications   Medication Sig Dispense Refill    amphetamine-dextroamphetamine (ADDERALL, 20MG,) 20 MG tablet Take 1 tablet by mouth daily for 30 days. 30 tablet 0    amphetamine-dextroamphetamine (ADDERALL XR) 25 MG extended release capsule Take 1 capsule by mouth every morning for 30 days. 30 capsule 0    FLUoxetine (PROZAC) 20 MG capsule Take 1 capsule by mouth in the morning. 30 capsule 5    busPIRone (BUSPAR) 5 MG tablet TAKE 1 TABLET BY MOUTH THREE TIMES DAILY AS NEEDED FOR ANXIETY OR STRESS.  DO NOT EXCEED 60 MG DAILY       No current facility-administered medications for this visit. Social History:   Social History     Socioeconomic History    Marital status: Single     Spouse name: Not on file    Number of children: Not on file    Years of education: Not on file    Highest education level: Master's degree (e.g., MA, MS, Alan, MEd, MSW, SAMSON)   Occupational History    Not on file   Tobacco Use    Smoking status: Never    Smokeless tobacco: Never   Vaping Use    Vaping Use: Never used   Substance and Sexual Activity    Alcohol use: Not Currently    Drug use: Never    Sexual activity: Yes   Other Topics Concern    Not on file   Social History Narrative    Not on file     Social Determinants of Health     Financial Resource Strain: Not on file   Food Insecurity: Not on file   Transportation Needs: Not on file   Physical Activity: Not on file   Stress: Not on file   Social Connections: Not on file   Intimate Partner Violence: Not on file   Housing Stability: Not on file     TOBACCO:   reports that he has never smoked. He has never used smokeless tobacco.  ETOH:   reports that he does not currently use alcohol. Family History:   Family History   Problem Relation Age of Onset    Heart Attack Mother     High Blood Pressure Mother     High Cholesterol Mother     Breast Cancer Mother     Alcohol Abuse Mother     Heart Attack Father     Other Father         COVID    High Blood Pressure Father     High Cholesterol Father     Cancer Maternal Grandmother         breast     Heart Attack Maternal Grandfather     Cancer Paternal Grandmother         \"female region\"    Heart Attack Paternal Grandfather     Cancer Paternal Aunt     Heart Attack Paternal Uncle      A:  Patient engaged and cooperative. No SI. Insight and motivation are good. Diagnosis:    1.  Complex posttraumatic stress disorder          Diagnosis Date    Attention deficit hyperactivity disorder (ADHD), combined type      Plan:  Pt interventions:  Conducted functional assessment, [place]-setting to identify pt's primary goals for [facility] visit / overall health, Supportive techniques, and Emphasized self-care as important for managing overall health.     Pt Behavioral Change Plan:   See Pt Instructions

## 2023-01-05 DIAGNOSIS — F90.9 ATTENTION DEFICIT HYPERACTIVITY DISORDER (ADHD), UNSPECIFIED ADHD TYPE: ICD-10-CM

## 2023-01-05 RX ORDER — DEXTROAMPHETAMINE SACCHARATE, AMPHETAMINE ASPARTATE MONOHYDRATE, DEXTROAMPHETAMINE SULFATE AND AMPHETAMINE SULFATE 6.25; 6.25; 6.25; 6.25 MG/1; MG/1; MG/1; MG/1
25 CAPSULE, EXTENDED RELEASE ORAL EVERY MORNING
Qty: 30 CAPSULE | Refills: 0 | Status: SHIPPED | OUTPATIENT
Start: 2023-01-05 | End: 2023-02-04

## 2023-01-05 RX ORDER — DEXTROAMPHETAMINE SACCHARATE, AMPHETAMINE ASPARTATE, DEXTROAMPHETAMINE SULFATE AND AMPHETAMINE SULFATE 5; 5; 5; 5 MG/1; MG/1; MG/1; MG/1
20 TABLET ORAL DAILY
Qty: 30 TABLET | Refills: 0 | Status: SHIPPED | OUTPATIENT
Start: 2023-01-05 | End: 2023-02-04

## 2023-01-05 NOTE — TELEPHONE ENCOUNTER
Pt requesting refill  amphetamine-dextroamphetamine (ADDERALL XR) 25 MG extended release capsule     amphetamine-dextroamphetamine (ADDERALL, 20MG,) 20 MG tablet     Last Office Visit  -  12/8/22  Next Office Visit  -  2/23/23

## 2023-01-05 NOTE — TELEPHONE ENCOUNTER
Last Office Visit  -  12/8/22  Next Office Visit  -  2/23/23    Last Filled  -  12/8/22  Last UDS -  n/a  Contract -  n/a

## 2023-01-06 ENCOUNTER — SCHEDULED TELEPHONE ENCOUNTER (OUTPATIENT)
Dept: PSYCHOLOGY | Age: 48
End: 2023-01-06

## 2023-01-06 DIAGNOSIS — F43.10 COMPLEX POSTTRAUMATIC STRESS DISORDER: Primary | ICD-10-CM

## 2023-01-06 NOTE — PROGRESS NOTES
Behavioral Health Consultation  Debra Jewell, Ph.D.  Psychologist  1/6/2023  9:11 AM EST      Time spent with Patient: 25 minutes  This is patient's seventh [facility] appointment.    Reason for Consult:    Chief Complaint   Patient presents with    Depression     Referring Provider: Kate Cutler, APRN - CNP  [address] [place] [address]    Feedback given to PCP.    TELEHEALTH VISIT -- Audio Only (During COVID-19 public health emergency)    Pursuant to the emergency declaration under the [place] Act and the National Emergencies Act, 1135 waiver authority and the Coronavirus Preparedness and Response Supplemental Appropriations Act, this phone call was conducted, with patient's consent, to reduce the patient's risk of exposure to COVID-19 and provide continuity of care for an established patient. Services were provided through a phone call discussion to substitute for in-person clinic visit. Pt gave verbal informed consent to participate in telehealth services.     Consent:  He and/or health care decision maker is aware that that he may receive a bill for this telephone service, depending on his insurance coverage, and has provided verbal consent to proceed: Yes    Conducted a risk-benefit analysis and determined that the patient's presenting problems are consistent with the use of telepsychology. Determined that the patient has sufficient knowledge and skills in the use of technology enabling them to adequately benefit from telepsychology. It was determined that this patient was able to be properly treated without an in-person session. Patient verified that they were currently located at the [place] address that was provided during registration.    Verified the following information:  Patient's identification: Yes  Patient location: [address] [place] [address]  Patient's call back number: [phone]  Patient's emergency contact's name and number, as well as permission to contact them if needed: Extended Emergency  Contact Information  Primary Emergency Contact: Arelis Damian  Home Phone: [phone]  Relation: Domestic Partner   needed? No    Provider location: [place], [address] [place] [address] affirm this is an episode with a patient who has not had a related appointment within my department in the past 7 days or scheduled within the next 24 hours. S:  Patient reported that he has focused on making a few significant changes to improve his overall well-being. One of these changes is that he resigned from his position at work. He has also made steps to end his current romantic relationship. Discussed boundaries, ways he can discuss this change with his partner and children in a compassionate way. O:  MSE:    Attitude: cooperative and friendly  Consciousness: alert  Orientation: oriented to person, place, time, general circumstance  Memory: recent and remote memory intact  Attention/Concentration: intact during session  Speech: normal rate and volume, well-articulated  Mood: stressed  Affect: euthymic  Perception: within normal limits  Thought Content: all-or-none thinking and intrusive thoughts  Thought Process: logical, coherent and goal-directed  Insight: good  Judgment: intact  Ability to understand instructions: Yes  Ability to respond meaningfully: Yes  Morbid Ideation: no   Suicide Assessment: no suicidal ideation, plan, or intent  Homicidal Ideation: no    History:    Medications:   Current Outpatient Medications   Medication Sig Dispense Refill    amphetamine-dextroamphetamine (ADDERALL, 20MG,) 20 MG tablet Take 1 tablet by mouth daily for 30 days. 30 tablet 0    amphetamine-dextroamphetamine (ADDERALL XR) 25 MG extended release capsule Take 1 capsule by mouth every morning for 30 days. 30 capsule 0    FLUoxetine (PROZAC) 20 MG capsule Take 1 capsule by mouth in the morning. 30 capsule 5    busPIRone (BUSPAR) 5 MG tablet TAKE 1 TABLET BY MOUTH THREE TIMES DAILY AS NEEDED FOR ANXIETY OR STRESS.  DO NOT EXCEED 60 MG DAILY       No current facility-administered medications for this visit. Social History:   Social History     Socioeconomic History    Marital status: Single     Spouse name: Not on file    Number of children: Not on file    Years of education: Not on file    Highest education level: Master's degree (e.g., MA, MS, Alan, MEd, MSW, SAMSON)   Occupational History    Not on file   Tobacco Use    Smoking status: Never    Smokeless tobacco: Never   Vaping Use    Vaping Use: Never used   Substance and Sexual Activity    Alcohol use: Not Currently    Drug use: Never    Sexual activity: Yes   Other Topics Concern    Not on file   Social History Narrative    Not on file     Social Determinants of Health     Financial Resource Strain: Not on file   Food Insecurity: Not on file   Transportation Needs: Not on file   Physical Activity: Not on file   Stress: Not on file   Social Connections: Not on file   Intimate Partner Violence: Not on file   Housing Stability: Not on file     TOBACCO:   reports that he has never smoked. He has never used smokeless tobacco.  ETOH:   reports that he does not currently use alcohol. Family History:   Family History   Problem Relation Age of Onset    Heart Attack Mother     High Blood Pressure Mother     High Cholesterol Mother     Breast Cancer Mother     Alcohol Abuse Mother     Heart Attack Father     Other Father         COVID    High Blood Pressure Father     High Cholesterol Father     Cancer Maternal Grandmother         breast     Heart Attack Maternal Grandfather     Cancer Paternal Grandmother         \"female region\"    Heart Attack Paternal Grandfather     Cancer Paternal Aunt     Heart Attack Paternal Uncle      A:  Patient engaged and cooperative. Denies SI. Insight and motivation are good. Diagnosis:    1.  Complex posttraumatic stress disorder          Diagnosis Date    Attention deficit hyperactivity disorder (ADHD), combined type      Plan:  Pt interventions:  Trained in improving communication skills, Conducted functional assessment, [place]-setting to identify pt's primary goals for [facility] visit / overall health, Supportive techniques, and Emphasized self-care as important for managing overall health.     Pt Behavioral Change Plan:   See Pt Instructions

## 2023-01-12 ENCOUNTER — TELEPHONE (OUTPATIENT)
Dept: FAMILY MEDICINE CLINIC | Age: 48
End: 2023-01-12

## 2023-01-12 DIAGNOSIS — L70.9 ACNE, UNSPECIFIED ACNE TYPE: Primary | ICD-10-CM

## 2023-01-12 NOTE — TELEPHONE ENCOUNTER
Patient is requesting a recommendation and referral for Dermatology. Patient wants to re-start Accutane.

## 2023-01-13 ENCOUNTER — TELEPHONE (OUTPATIENT)
Dept: FAMILY MEDICINE CLINIC | Age: 48
End: 2023-01-13

## 2023-01-13 DIAGNOSIS — F90.9 ATTENTION DEFICIT HYPERACTIVITY DISORDER (ADHD), UNSPECIFIED ADHD TYPE: Primary | ICD-10-CM

## 2023-01-13 RX ORDER — DEXTROAMPHETAMINE SACCHARATE, AMPHETAMINE ASPARTATE, DEXTROAMPHETAMINE SULFATE AND AMPHETAMINE SULFATE 2.5; 2.5; 2.5; 2.5 MG/1; MG/1; MG/1; MG/1
20 TABLET ORAL DAILY
Qty: 60 TABLET | Refills: 0 | Status: SHIPPED | OUTPATIENT
Start: 2023-01-13 | End: 2023-02-12

## 2023-01-13 NOTE — TELEPHONE ENCOUNTER
-Pt needing New Adderall script sent in because his Adderall 20mg is not available/on backorder.  -Pt states Pharmacy does have Adderall 10 mgs to \"first come/ first serve\" scripts, so Pt is asking for new script asap please.    -Pt is asking for Adderall 10 mgs 60 tablets please.     [facility] [address] [place] [address] -  [phone]

## 2023-01-13 NOTE — TELEPHONE ENCOUNTER
Pt requesting Dermatology Referral.  Pt is asking for:  [facility]  Dr. Barbi Antony   Ph. [phone]

## 2023-01-20 ENCOUNTER — TELEPHONE (OUTPATIENT)
Dept: CASE MANAGEMENT | Age: 48
End: 2023-01-20

## 2023-01-20 DIAGNOSIS — R91.1 PULMONARY NODULE: Primary | ICD-10-CM

## 2023-01-20 NOTE — TELEPHONE ENCOUNTER
Per imaging report CT CARDIAC CALCIUM SCORING 1/18/2022 with F/U imaging recommendations. Cardiologist instructing pt to F/U with PCP for incidental pulmonary nodule:     1.0 cm ground-glass right upper lobe pulmonary nodule. RECOMMENDATION:   Fleischner Society guidelines for follow-up and management of incidentally   detected subsolid pulmonary nodules:       Solitary ground glass nodule   > than or equal to 6 mm - CT at 6-12 months to confirm persistence, then CT   every 2 years until 5 years.        Pt is due for f/u CT Chest    [facility]  Lung Navigation R.TLeo(DEMARIO)

## 2023-01-21 NOTE — TELEPHONE ENCOUNTER
CT chest ordered- patient should call [address] to schedule.  There was a pulmonary nodule seen when they did the CT calcium scoring with cardiology

## 2023-01-30 ENCOUNTER — HOSPITAL ENCOUNTER (OUTPATIENT)
Dept: CT IMAGING | Age: 48
Discharge: HOME | End: 2023-01-30
Payer: OTHER GOVERNMENT

## 2023-01-30 DIAGNOSIS — R91.1 PULMONARY NODULE: ICD-10-CM

## 2023-01-30 PROCEDURE — 71250 CT THORAX DX C-: CPT

## 2023-02-06 DIAGNOSIS — F90.9 ATTENTION DEFICIT HYPERACTIVITY DISORDER (ADHD), UNSPECIFIED ADHD TYPE: ICD-10-CM

## 2023-02-06 DIAGNOSIS — K22.89 ESOPHAGEAL THICKENING: Primary | ICD-10-CM

## 2023-02-06 RX ORDER — DEXTROAMPHETAMINE SACCHARATE, AMPHETAMINE ASPARTATE MONOHYDRATE, DEXTROAMPHETAMINE SULFATE AND AMPHETAMINE SULFATE 6.25; 6.25; 6.25; 6.25 MG/1; MG/1; MG/1; MG/1
25 CAPSULE, EXTENDED RELEASE ORAL EVERY MORNING
Qty: 30 CAPSULE | Refills: 0 | Status: SHIPPED | OUTPATIENT
Start: 2023-02-10 | End: 2023-02-07 | Stop reason: SDUPTHER

## 2023-02-06 NOTE — TELEPHONE ENCOUNTER
Pt called in requesting medication listed below     amphetamine-dextroamphetamine (ADDERALL XR) 25 MG extended release capsule        Last Office Visit  -  12/8/22  Next Office Visit  -  2/23/23  Last Filled  -  1/5/23

## 2023-02-06 NOTE — TELEPHONE ENCOUNTER
Patient called in stating he went to pharmacy to get his medication- patient said pharmacy told him that he cannot pick it up until the 10th. Patient is confused and states he is completely out.       Please advisr

## 2023-02-06 NOTE — PROGRESS NOTES
[address] Dianna Tony MD  [address] [place] [address] [address], [address]  [address] [place] [address]  Phone: [phone]  Fax: [phone]

## 2023-02-07 DIAGNOSIS — F90.9 ATTENTION DEFICIT HYPERACTIVITY DISORDER (ADHD), UNSPECIFIED ADHD TYPE: ICD-10-CM

## 2023-02-07 RX ORDER — DEXTROAMPHETAMINE SACCHARATE, AMPHETAMINE ASPARTATE, DEXTROAMPHETAMINE SULFATE AND AMPHETAMINE SULFATE 2.5; 2.5; 2.5; 2.5 MG/1; MG/1; MG/1; MG/1
20 TABLET ORAL DAILY
Qty: 60 TABLET | Refills: 0 | Status: SHIPPED | OUTPATIENT
Start: 2023-02-10 | End: 2023-02-15 | Stop reason: ALTCHOICE

## 2023-02-07 RX ORDER — DEXTROAMPHETAMINE SACCHARATE, AMPHETAMINE ASPARTATE MONOHYDRATE, DEXTROAMPHETAMINE SULFATE AND AMPHETAMINE SULFATE 6.25; 6.25; 6.25; 6.25 MG/1; MG/1; MG/1; MG/1
25 CAPSULE, EXTENDED RELEASE ORAL EVERY MORNING
Qty: 30 CAPSULE | Refills: 0 | Status: SHIPPED | OUTPATIENT
Start: 2023-02-07 | End: 2023-03-09

## 2023-02-07 NOTE — TELEPHONE ENCOUNTER
When I spoke with the pharmacy concerning early refills, they state it is not necessary to call in for these if the fill day is on the script

## 2023-02-07 NOTE — TELEPHONE ENCOUNTER
Last Office Visit  -  12/8/22  Next Office Visit  -  2/23/23    Last Filled  -  1/13/23  Last UDS -    Contract -      When pt was contacted about the other dose, he asked if this dose can be post dated and sent to pharmacy too.

## 2023-02-07 NOTE — TELEPHONE ENCOUNTER
Patient calling to check on the status of message yesterday. Patient states that the pharmacy won't give him his medication because it says the earliest fill date is 2/10/23. Patient states his medication was off by a week due to the national shortage. Please advise.

## 2023-02-08 NOTE — TELEPHONE ENCOUNTER
Patient states that he is still unable to get his medication despite the fill date being changed to 2/7/23. Patient states their script still says the fill date is on 2/10/23 and is refusing to fill the medication. Please advise.

## 2023-02-08 NOTE — TELEPHONE ENCOUNTER
Spoke to pharmacy and they said the adderall 25 is ready to be picked up and the adderall 10 will not be ready until 2/10. Notified pt and he understood.

## 2023-02-13 ENCOUNTER — TELEPHONE (OUTPATIENT)
Dept: FAMILY MEDICINE CLINIC | Age: 48
End: 2023-02-13

## 2023-02-13 NOTE — TELEPHONE ENCOUNTER
Patient is requesting a recommendation and referral to be processed through insurance for a bleeding hemorrhoid.

## 2023-02-15 ENCOUNTER — OFFICE VISIT (OUTPATIENT)
Dept: PRIMARY CARE CLINIC | Age: 48
End: 2023-02-15
Payer: OTHER GOVERNMENT

## 2023-02-15 ENCOUNTER — TELEPHONE (OUTPATIENT)
Dept: PRIMARY CARE CLINIC | Age: 48
End: 2023-02-15

## 2023-02-15 VITALS
HEART RATE: 83 BPM | BODY MASS INDEX: 26.03 KG/M2 | DIASTOLIC BLOOD PRESSURE: 86 MMHG | TEMPERATURE: 97.9 F | OXYGEN SATURATION: 97 % | SYSTOLIC BLOOD PRESSURE: 126 MMHG | WEIGHT: 171.2 LBS

## 2023-02-15 DIAGNOSIS — L70.9 ACNE, UNSPECIFIED ACNE TYPE: ICD-10-CM

## 2023-02-15 DIAGNOSIS — Z00.00 ANNUAL PHYSICAL EXAM: Primary | ICD-10-CM

## 2023-02-15 DIAGNOSIS — Z13.1 DIABETES MELLITUS SCREENING: ICD-10-CM

## 2023-02-15 DIAGNOSIS — K21.9 GASTROESOPHAGEAL REFLUX DISEASE, UNSPECIFIED WHETHER ESOPHAGITIS PRESENT: ICD-10-CM

## 2023-02-15 DIAGNOSIS — E78.5 HYPERLIPIDEMIA, UNSPECIFIED HYPERLIPIDEMIA TYPE: ICD-10-CM

## 2023-02-15 DIAGNOSIS — F43.10 PTSD (POST-TRAUMATIC STRESS DISORDER): ICD-10-CM

## 2023-02-15 DIAGNOSIS — F41.9 ANXIETY: ICD-10-CM

## 2023-02-15 DIAGNOSIS — Z98.890 HISTORY OF HEMORRHOIDECTOMY: ICD-10-CM

## 2023-02-15 DIAGNOSIS — F90.9 ATTENTION DEFICIT HYPERACTIVITY DISORDER (ADHD), UNSPECIFIED ADHD TYPE: ICD-10-CM

## 2023-02-15 DIAGNOSIS — K64.9 HEMORRHOIDS, UNSPECIFIED HEMORRHOID TYPE: ICD-10-CM

## 2023-02-15 PROBLEM — R19.7 SYMPTOM OF DIARRHEA: Status: RESOLVED | Noted: 2021-06-28 | Resolved: 2023-02-15

## 2023-02-15 PROBLEM — R07.1 CHEST PAIN ON BREATHING: Status: RESOLVED | Noted: 2021-06-28 | Resolved: 2023-02-15

## 2023-02-15 PROBLEM — R07.2 PRECORDIAL PAIN: Status: RESOLVED | Noted: 2021-06-28 | Resolved: 2023-02-15

## 2023-02-15 PROBLEM — J06.9 ACUTE UPPER RESPIRATORY INFECTION: Status: RESOLVED | Noted: 2021-06-28 | Resolved: 2023-02-15

## 2023-02-15 PROBLEM — M62.830 BACK MUSCLE SPASM: Status: RESOLVED | Noted: 2021-06-28 | Resolved: 2023-02-15

## 2023-02-15 PROBLEM — M54.6 PAIN IN THORACIC SPINE: Status: RESOLVED | Noted: 2021-06-28 | Resolved: 2023-02-15

## 2023-02-15 PROBLEM — Z20.822 SEVERE ACUTE RESPIRATORY SYNDROME CORONAVIRUS 2 (SARS-COV-2) TEST RESULT UNKNOWN: Status: RESOLVED | Noted: 2021-06-28 | Resolved: 2023-02-15

## 2023-02-15 PROBLEM — R13.10 DYSPHAGIA: Status: RESOLVED | Noted: 2021-06-28 | Resolved: 2023-02-15

## 2023-02-15 PROBLEM — I82.1: Status: RESOLVED | Noted: 2021-06-28 | Resolved: 2023-02-15

## 2023-02-15 PROBLEM — H35.40 PERIPHERAL RETINAL DEGENERATION: Status: RESOLVED | Noted: 2021-06-28 | Resolved: 2023-02-15

## 2023-02-15 PROBLEM — E66.3 OVERWEIGHT: Status: RESOLVED | Noted: 2021-06-28 | Resolved: 2023-02-15

## 2023-02-15 PROBLEM — J02.9 PHARYNGITIS: Status: RESOLVED | Noted: 2021-06-28 | Resolved: 2023-02-15

## 2023-02-15 PROCEDURE — 3074F SYST BP LT 130 MM HG: CPT | Performed by: FAMILY MEDICINE

## 2023-02-15 PROCEDURE — 3079F DIAST BP 80-89 MM HG: CPT | Performed by: FAMILY MEDICINE

## 2023-02-15 PROCEDURE — 99396 PREV VISIT EST AGE 40-64: CPT | Performed by: FAMILY MEDICINE

## 2023-02-15 RX ORDER — DEXTROAMPHETAMINE SACCHARATE, AMPHETAMINE ASPARTATE, DEXTROAMPHETAMINE SULFATE AND AMPHETAMINE SULFATE 5; 5; 5; 5 MG/1; MG/1; MG/1; MG/1
20 TABLET ORAL DAILY
Qty: 30 TABLET | Refills: 0 | Status: SHIPPED | OUTPATIENT
Start: 2023-02-15 | End: 2023-03-17

## 2023-02-15 RX ORDER — TRAMADOL HYDROCHLORIDE 50 MG/1
50 TABLET ORAL EVERY 6 HOURS PRN
Qty: 12 TABLET | Refills: 0 | Status: SHIPPED | OUTPATIENT
Start: 2023-02-15 | End: 2023-02-18

## 2023-02-15 RX ORDER — CLOBETASOL PROPIONATE 0.5 MG/G
OINTMENT TOPICAL
Qty: 60 G | Refills: 1 | Status: SHIPPED | OUTPATIENT
Start: 2023-02-15

## 2023-02-15 SDOH — ECONOMIC STABILITY: INCOME INSECURITY: HOW HARD IS IT FOR YOU TO PAY FOR THE VERY BASICS LIKE FOOD, HOUSING, MEDICAL CARE, AND HEATING?: NOT HARD AT ALL

## 2023-02-15 SDOH — ECONOMIC STABILITY: HOUSING INSECURITY
IN THE LAST 12 MONTHS, WAS THERE A TIME WHEN YOU DID NOT HAVE A STEADY PLACE TO SLEEP OR SLEPT IN A SHELTER (INCLUDING NOW)?: NO

## 2023-02-15 SDOH — ECONOMIC STABILITY: FOOD INSECURITY: WITHIN THE PAST 12 MONTHS, YOU WORRIED THAT YOUR FOOD WOULD RUN OUT BEFORE YOU GOT MONEY TO BUY MORE.: NEVER TRUE

## 2023-02-15 SDOH — ECONOMIC STABILITY: FOOD INSECURITY: WITHIN THE PAST 12 MONTHS, THE FOOD YOU BOUGHT JUST DIDN'T LAST AND YOU DIDN'T HAVE MONEY TO GET MORE.: NEVER TRUE

## 2023-02-15 ASSESSMENT — ENCOUNTER SYMPTOMS
NAUSEA: 0
TROUBLE SWALLOWING: 0
BLOOD IN STOOL: 1
DIARRHEA: 0
SHORTNESS OF BREATH: 0
ABDOMINAL PAIN: 0
EYE ITCHING: 0
COUGH: 0
EYE DISCHARGE: 0
SORE THROAT: 0
VOMITING: 0

## 2023-02-15 ASSESSMENT — PATIENT HEALTH QUESTIONNAIRE - PHQ9
2. FEELING DOWN, DEPRESSED OR HOPELESS: 0
SUM OF ALL RESPONSES TO PHQ QUESTIONS 1-9: 0
1. LITTLE INTEREST OR PLEASURE IN DOING THINGS: 0
SUM OF ALL RESPONSES TO PHQ9 QUESTIONS 1 & 2: 0

## 2023-02-15 NOTE — ASSESSMENT & PLAN NOTE
Uncontrolled, changes made today: High-dose steroid cream along with narcotic pain medication as over-the-counter pain medications are not helping

## 2023-02-15 NOTE — TELEPHONE ENCOUNTER
Please advise patient that I have put in the following referrals. He needs to call and schedule these appointments. Since these referrals were already put in by previous primary care/documented in their note previously, I am unable to add anything to my note regarding these 2 referrals as we did not discuss any of these in clinic today since he had a new patient appointment and we could only get through so many things today.       [facility], MD [address], [address]  Phone: [phone]  Fax: [phone]    [facility] [place] [facility] - Vee Dowling MD  [facility], [address] [facility], [address] [facility]  Ph: [phone]      Dr. Roshni Martin

## 2023-02-15 NOTE — TELEPHONE ENCOUNTER
Please advise patient that I will add the \"must see\" in his chart for him to be able to see Dr. Yarely Avitia    Also I am not sure what the GI and the dermatology referrals are for?   I cannot put in a referral until I know the reason for referral    Please advise patient to let us know

## 2023-02-15 NOTE — PROGRESS NOTES
2/15/2023    Hetal Patino (:  1975) is a 52 y.o. male, here for a preventive medicine evaluation. Patient Active Problem List   Diagnosis    PTSD (post-traumatic stress disorder)    Anxiety    Attention deficit hyperactivity disorder (ADHD)    Achilles tendinitis    Adjustment disorder with anxiety    Allergic rhinitis    Arthralgia of wrist    Astigmatism    Cervicalgia    Decreased libido    Encounter for postoperative care    Esophageal reflux    Finding of above normal blood pressure    Gastroesophageal reflux disease with esophagitis    Herniated lumbar intervertebral disc    Hyperlipidemia    Hypertension    Primary insomnia    Low back pain    Myopia, bilateral    Nasal congestion    Nephrolithiasis    Obstructive sleep apnea syndrome    Other fatigue    Health examination of defined subpopulation    Other specified general medical examinations    Problems in relationship with spouse or partner    Sciatica    Stage 2 chronic kidney disease    Thrombosed external hemorrhoids    Regular astigmatism, bilateral    Abnormal EKG    Family history of heart disease    Peripheral retinal degeneration of both eyes    History of hemorrhoidectomy    Hemorrhoids       History of Hypertension  - currently not on any HTN meds    ADHD  - Adderall  - 2 doses through the day   - was Rx by primary care    Anxiety  PTSD  - follows with Psychologist - Dr Vira Wolfe  - now following [facility] psychiatrist - [facility]  - usually every week follow up  - prozac and buspar   - served in [facility], father \" in my arms\", 9 hours later \"mom dies\"  - Step dad  \"57 days after my mom\"    Hemorrhoids  History of hemorrhoid surgery (in [employer])  History of Thrombosed Hemorrhoids  - done in [facility]  - wearing pads due to bleeding   - painful  - OTC medication for pain not helping     Review of Systems   Constitutional:  Negative for appetite change, chills, fatigue and fever.    HENT:  Negative for congestion, ear pain, sneezing, sore throat and trouble swallowing. Eyes:  Negative for discharge and itching. Respiratory:  Negative for cough and shortness of breath. Cardiovascular:  Negative for chest pain and leg swelling. Gastrointestinal:  Positive for blood in stool. Negative for abdominal pain, diarrhea, nausea and vomiting. Thrombosed hemorrhoid  Painful  Blood in stool   Genitourinary:  Negative for dysuria and urgency. Musculoskeletal:  Negative for joint swelling and neck pain. Neurological:  Negative for light-headedness and headaches. Psychiatric/Behavioral:  Negative for dysphoric mood. The patient is not nervous/anxious. Prior to Visit Medications    Medication Sig Taking? Authorizing Provider   clobetasol (TEMOVATE) 0.05 % ointment Apply topically 2 times daily for about 2 weeks Yes Chen Alex MD   traMADol (ULTRAM) 50 MG tablet Take 1 tablet by mouth every 6 hours as needed for Pain for up to 3 days. Intended supply: 7 days. Take lowest dose possible to manage pain Max Daily Amount: 200 mg Yes Chen Alex MD   amphetamine-dextroamphetamine (ADDERALL, 20MG,) 20 MG tablet Take 1 tablet by mouth daily for 30 days. Max Daily Amount: 20 mg Yes Chen Alex MD   amphetamine-dextroamphetamine (ADDERALL XR) 25 MG extended release capsule Take 1 capsule by mouth every morning for 30 days. Yes YUMIKO Harris CNP   FLUoxetine (PROZAC) 20 MG capsule Take 1 capsule by mouth in the morning. Yes YUMIKO Harris CNP   busPIRone (BUSPAR) 5 MG tablet TAKE 1 TABLET BY MOUTH THREE TIMES DAILY AS NEEDED FOR ANXIETY OR STRESS.  DO NOT EXCEED 60 MG DAILY Yes Historical Provider, MD        Allergies   Allergen Reactions    Cat Hair Extract      Other reaction(s): Unknown       Past Medical History:   Diagnosis Date    Attention deficit hyperactivity disorder (ADHD), combined type        Past Surgical History:   Procedure Laterality Date    HEMORRHOID SURGERY  2010    VASECTOMY  2015       Social History     Socioeconomic History    Marital status: Single     Spouse name: Not on file    Number of children: Not on file    Years of education: Not on file    Highest education level: Master's degree (e.g., MA, MS, Alan, MEd, MSW, SAMSON)   Occupational History    Not on file   Tobacco Use    Smoking status: Never    Smokeless tobacco: Never   Vaping Use    Vaping Use: Never used   Substance and Sexual Activity    Alcohol use: Not Currently    Drug use: Never    Sexual activity: Yes   Other Topics Concern    Not on file   Social History Narrative    Not on file     Social Determinants of Health     Financial Resource Strain: Low Risk     Difficulty of Paying Living Expenses: Not hard at all   Food Insecurity: No Food Insecurity    Worried About Running Out of Food in the Last Year: Never true    [address] in the Last Year: Never true   Transportation Needs: Unknown    Lack of Transportation (Medical): Not on file    Lack of Transportation (Non-Medical):  No   Physical Activity: Not on file   Stress: Not on file   Social Connections: Not on file   Intimate Partner Violence: Not on file   Housing Stability: Unknown    Unable to Pay for Housing in the Last Year: Not on file    Number of Places Lived in the Last Year: Not on file    Unstable Housing in the Last Year: No        Family History   Problem Relation Age of Onset    Heart Attack Mother     High Blood Pressure Mother     High Cholesterol Mother     Breast Cancer Mother     Alcohol Abuse Mother     Heart Attack Father     Other Father         COVID    High Blood Pressure Father     High Cholesterol Father     Cancer Maternal Grandmother         breast     Heart Attack Maternal Grandfather     Cancer Paternal Grandmother         \"female region\"    Heart Attack Paternal Grandfather     Cancer Paternal Aunt     Heart Attack Paternal Uncle        ADVANCE DIRECTIVE: N, <no information>    Vitals:    02/15/23 0823 02/15/23 0832   BP: (!) 136/100 126/86   Site: Right Upper Arm Left Upper Arm   Position: Sitting Sitting   Cuff Size: Small Adult Medium Adult   Pulse: 83    Temp: 97.9 °F (36.6 °C)    TempSrc: Temporal    SpO2: 97%    Weight: 171 lb 3.2 oz (77.7 kg)      Estimated body mass index is 26.03 kg/m² as calculated from the following:    Height as of 2/22/22: 5' 8\" (1.727 m). Weight as of this encounter: 171 lb 3.2 oz (77.7 kg). Physical Exam  Constitutional:       General: He is not in acute distress. Appearance: Normal appearance. Comments: Patient very uncomfortable sitting on the chair, has to sit on 1 side due to ongoing pain with hemorrhoid     HENT:      Head: Normocephalic and atraumatic. Right Ear: Tympanic membrane and external ear normal.      Left Ear: Tympanic membrane and external ear normal.      Nose: Nose normal. No congestion or rhinorrhea. Eyes:      General:         Right eye: No discharge. Left eye: No discharge. Extraocular Movements: Extraocular movements intact. Conjunctiva/sclera: Conjunctivae normal.   Cardiovascular:      Rate and Rhythm: Normal rate and regular rhythm. Heart sounds: Normal heart sounds. No murmur heard. Pulmonary:      Effort: Pulmonary effort is normal.      Breath sounds: Normal breath sounds. No wheezing. Chest:      Chest wall: No tenderness. Abdominal:      General: Bowel sounds are normal. There is no distension. Palpations: Abdomen is soft. Tenderness: There is no abdominal tenderness. Musculoskeletal:      Cervical back: Normal range of motion and neck supple. Skin:     General: Skin is warm and dry. Neurological:      General: No focal deficit present. Mental Status: He is alert and oriented to person, place, and time. Psychiatric:         Mood and Affect: Mood normal.         Behavior: Behavior normal.       No flowsheet data found.     Lab Results   Component Value Date/Time    CHOL 200 01/07/2022 10:18 AM    TRIG 114 01/07/2022 10:18 AM HDL 57 01/07/2022 10:18 AM    LDLCALC 120 01/07/2022 10:18 AM       The 10-year ASCVD risk score (Johanny CHRISTOPHER, et al., 2019) is: 2.1%    Values used to calculate the score:      Age: 52 years      Sex: Male      Is Non- : No      Diabetic: No      Tobacco smoker: No      Systolic Blood Pressure: 883 mmHg      Is BP treated: No      HDL Cholesterol: 57 mg/dL      Total Cholesterol: 200 mg/dL    Immunization History   Administered Date(s) Administered    Anthrax (BioThrax) 12/12/2006, 07/28/2011, 08/30/2011, 04/08/2019    COVID-19, PFIZER PURPLE top, DILUTE for use, (age 15 y+), 30mcg/0.3mL 01/14/2021, 02/04/2021    Hepatitis A Adult (Havrix, Vaqta) 02/28/1998, 08/13/1998, 04/25/2014    Influenza A (D1F7-77) Vaccine IM 12/14/2009    Influenza Virus Vaccine 11/25/2001, 12/07/2002, 11/29/2005, 10/20/2006, 11/26/2007, 10/31/2008, 09/09/2009, 09/16/2010, 08/10/2011, 09/07/2012, 10/12/2016, 09/28/2017, 10/05/2018, 11/13/2019    Influenza Whole 02/20/1998, 10/14/1998, 11/09/1999, 02/10/2001, 11/13/2003    Influenza, FLUARIX, FLULAVAL, FLUZONE (age 10 mo+) AND AFLURIA, (age 1 y+), PF, 0.5mL 10/07/2020    Influenza, FLUMIST, (age 2-51 y), Live, Intranasal, 0.2mL 09/26/2013, 11/03/2014, 10/23/2015    Japanese Encephalitis, IM (Ixiaro) 08/10/2011    Meningococcal MCV4P (Menactra) 02/05/2009, 04/08/2019    Meningococcal MPSV4 (Menomune) 02/20/1998    Polio IPV (IPOL) 02/28/1998, 06/13/2019    Rabies IM Diploid Cell Culture (Imovax) 04/08/2019, 04/15/2019, 04/26/2019    Smallpox (FXON2841) 12/12/2006    Tdap (Boostrix, Adacel) 03/10/2008    Typhoid Vac, Parenteral, Other Than Acetone-killed, Dried 01/10/1999, 04/04/2001, 12/12/2006    Typhoid Vi capsular polysaccharide (Typhim VI) 05/11/2011, 04/08/2019    Yellow Fever (YF-Vax) 02/02/1999       Health Maintenance   Topic Date Due    Diabetes screen  Never done    DTaP/Tdap/Td vaccine (2 - Td or Tdap) 03/10/2018    Colorectal Cancer Screen  Never done COVID-19 Vaccine (3 - Booster for Pfizer series) 04/01/2021    Flu vaccine (1) 08/01/2022    Depression Screen  09/21/2023    Lipids  01/07/2027    Orthopox (Smallpox/Monkeypox) Vaccine  Completed    Hepatitis A vaccine  Aged Out    Hib vaccine  Aged Out    Meningococcal (ACWY) vaccine  Aged Out    Pneumococcal 0-64 years Vaccine  Aged Out    Hepatitis C screen  Discontinued    HIV screen  Discontinued       Assessment & Plan   Annual physical exam  -     Comprehensive Metabolic Panel; Future  -     CBC with Auto Differential; Future  PTSD (post-traumatic stress disorder)  Assessment & Plan:   Well-controlled, continue current medications   Prozac and BuSpar  Follows with psychiatry  Orders:  -     TSH with Reflex; Future  Anxiety  Assessment & Plan:   Well-controlled, continue current medications   Prozac and BuSpar  Follows with psychiatry    Orders:  -     TSH with Reflex; Future  Attention deficit hyperactivity disorder (ADHD), unspecified ADHD type  Assessment & Plan:   Well-controlled, continue current medications   Contract signed on 2/15/2023  UDS pending 2/15/2020  Adderall 25 mg extended release daily with 20 mg regular release in the afternoon  Orders:  -     amphetamine-dextroamphetamine (ADDERALL, 20MG,) 20 MG tablet; Take 1 tablet by mouth daily for 30 days. Max Daily Amount: 20 mg, Disp-30 tablet, R-0Normal  -     DRUG SCREEN MULTI URINE; Future  Diabetes mellitus screening  -     Hemoglobin A1C; Future  Hyperlipidemia, unspecified hyperlipidemia type  Assessment & Plan:   Borderline controlled, lifestyle modifications recommended   Recheck  Orders:  -     Lipid Panel; Future  History of hemorrhoidectomy  -     Roz Hamman, MD, Colorectal Surgery, [facility]  -     clobetasol (TEMOVATE) 0.05 % ointment; Apply topically 2 times daily for about 2 weeks, Disp-60 g, R-1, Normal  -     traMADol (ULTRAM) 50 MG tablet; Take 1 tablet by mouth every 6 hours as needed for Pain for up to 3 days. Intended supply: 7 days. Take lowest dose possible to manage pain Max Daily Amount: 200 mg, Disp-12 tablet, R-0Normal  Thrombosed Hemorrhoids  Comments:  Patient must see Dr. Farnaz Shukla who is a colorectal surgeon with [facility] in order to manage and treat his hemorrhoids. Assessment & Plan:   Uncontrolled, changes made today: High-dose steroid cream along with narcotic pain medication as over-the-counter pain medications are not helping  Orders:  -     Mackenzie Mcdonald MD, Colorectal Surgery, [facility]  -     clobetasol (TEMOVATE) 0.05 % ointment; Apply topically 2 times daily for about 2 weeks, Disp-60 g, R-1, Normal  -     traMADol (ULTRAM) 50 MG tablet; Take 1 tablet by mouth every 6 hours as needed for Pain for up to 3 days. Intended supply: 7 days. Take lowest dose possible to manage pain Max Daily Amount: 200 mg, Disp-12 tablet, R-0Normal  Gastroesophageal reflux disease, unspecified whether esophagitis present  -     AFL - Juana Escobar MD, Gastroenterology, [facility]  Acne, unspecified acne type  -     Eve Engel MD, Dermatology, [facility]  Return in about 3 months (around 5/15/2023), or if symptoms worsen or fail to improve, for ADHD follow up OV or VV . Patient currently in a lot of discomfort due to the [facility] hemorrhoid\" with patient's history of hemorrhoid surgery and thrombosed hemorrhoids patient needs to see colorectal surgery as soon as possible. An urgent referral was put in the chart.     --Brit Ludwig MD

## 2023-02-15 NOTE — ASSESSMENT & PLAN NOTE
Well-controlled, continue current medications   Contract signed on 2/15/2023  UDS pending 2/15/2020  Adderall 25 mg extended release daily with 20 mg regular release in the afternoon

## 2023-02-15 NOTE — TELEPHONE ENCOUNTER
Regarding Dr Roshni Martin referral   [id number] to:  Sherly Rogers MD  NOT covered by THE [facility]   needs to state  patient ' must see'  Brooklynn Roca MD  in order for [facility] to cover.     Also - patient requesting  re-submit 2-added referrals to to [employer] insurance:(initial referring DrLeo no longer available)    1 referrals  - Dermatology  1 referral - GI

## 2023-02-15 NOTE — TELEPHONE ENCOUNTER
[phone] (home)   Spoke with patient he states,he seen dermatology dr yesterday for acne need authorization and referral and gastrology dr for reflex and he have   insurance need all referral for authorization .

## 2023-02-16 LAB
AMPHETAMINE SCREEN, URINE: POSITIVE
BARBITURATE SCREEN URINE: ABNORMAL
BENZODIAZEPINE SCREEN, URINE: ABNORMAL
CANNABINOID SCREEN URINE: ABNORMAL
COCAINE METABOLITE SCREEN URINE: ABNORMAL
FENTANYL SCREEN, URINE: ABNORMAL
Lab: ABNORMAL
METHADONE SCREEN, URINE: ABNORMAL
OPIATE SCREEN URINE: ABNORMAL
OXYCODONE URINE: ABNORMAL
PH UA: 6
PHENCYCLIDINE SCREEN URINE: ABNORMAL

## 2023-02-21 ENCOUNTER — TELEPHONE (OUTPATIENT)
Dept: FAMILY MEDICINE CLINIC | Age: 48
End: 2023-02-21

## 2023-02-21 NOTE — TELEPHONE ENCOUNTER
MELVA... Patient called to let the office know he's no longer a patient of [facility]ler[facility]. Pt's insurance is no longer in network.

## 2023-02-22 ENCOUNTER — OFFICE VISIT (OUTPATIENT)
Dept: SURGERY | Age: 48
End: 2023-02-22
Payer: OTHER GOVERNMENT

## 2023-02-22 VITALS
HEART RATE: 82 BPM | HEIGHT: 68 IN | TEMPERATURE: 98.2 F | DIASTOLIC BLOOD PRESSURE: 100 MMHG | SYSTOLIC BLOOD PRESSURE: 144 MMHG | WEIGHT: 169.98 LBS | OXYGEN SATURATION: 98 % | RESPIRATION RATE: 18 BRPM | BODY MASS INDEX: 25.76 KG/M2

## 2023-02-22 DIAGNOSIS — K64.5 PERIANAL VENOUS THROMBOSIS: Primary | ICD-10-CM

## 2023-02-22 DIAGNOSIS — Z12.11 COLON CANCER SCREENING: ICD-10-CM

## 2023-02-22 PROCEDURE — 3077F SYST BP >= 140 MM HG: CPT | Performed by: SURGERY

## 2023-02-22 PROCEDURE — 3080F DIAST BP >= 90 MM HG: CPT | Performed by: SURGERY

## 2023-02-22 PROCEDURE — 99243 OFF/OP CNSLTJ NEW/EST LOW 30: CPT | Performed by: SURGERY

## 2023-02-22 NOTE — PROGRESS NOTES
Subjective:     Patient is a 52 y.o. man with hemorrhoids    The patient is referred by Dr. Gretchen Hernandez MD. Results of consultation will be shared via electronic medical record    HPI: Mr. Dipak Crane reports recurrent issues with hemorrhoids. He has a history of hemorrhoidectomy and thromboses. He had hemorrhoid surgery 2010 and it was painful as expected. He reports a recent thrombosis was quite painful then it eroded and drained on its own.      Patient Active Problem List    Diagnosis Date Noted    History of hemorrhoidectomy 02/15/2023    Hemorrhoids 02/15/2023    Peripheral retinal degeneration of both eyes 12/08/2022    Abnormal EKG 01/07/2022    Family history of heart disease 01/07/2022    Achilles tendinitis 06/28/2021    Adjustment disorder with anxiety 06/28/2021    Allergic rhinitis 06/28/2021    Arthralgia of wrist 06/28/2021    Astigmatism 06/28/2021    Cervicalgia 06/28/2021    Encounter for postoperative care 06/28/2021    Esophageal reflux 06/28/2021    Finding of above normal blood pressure 06/28/2021    Gastroesophageal reflux disease with esophagitis 06/28/2021    Herniated lumbar intervertebral disc 06/28/2021    Hyperlipidemia 06/28/2021    Hypertension 06/28/2021    Nasal congestion 06/28/2021    Nephrolithiasis 06/28/2021    Other fatigue 06/28/2021    Health examination of defined subpopulation 06/28/2021    Other specified general medical examinations 06/28/2021    Sciatica 06/28/2021    Stage 2 chronic kidney disease 06/28/2021    Thrombosed external hemorrhoids 06/28/2021    Obstructive sleep apnea syndrome 11/11/2020    PTSD (post-traumatic stress disorder) 10/07/2020    Anxiety 10/07/2020    Attention deficit hyperactivity disorder (ADHD) 10/07/2020    Myopia, bilateral 04/10/2018    Regular astigmatism, bilateral 04/10/2018    Primary insomnia 09/06/2017    Problems in relationship with spouse or partner 08/25/2017    Decreased libido 11/02/2016    Low back pain 03/07/2016     Past Medical History:   Diagnosis Date    Attention deficit hyperactivity disorder (ADHD), combined type       Past Surgical History:   Procedure Laterality Date    HEMORRHOID SURGERY  2010    VASECTOMY  2015      Not in a hospital admission. Allergies   Allergen Reactions    Cat Hair Extract      Other reaction(s): Unknown      Social History     Tobacco Use    Smoking status: Never    Smokeless tobacco: Never   Substance Use Topics    Alcohol use: Not Currently      Family History   Problem Relation Age of Onset    Heart Attack Mother     High Blood Pressure Mother     High Cholesterol Mother     Breast Cancer Mother     Alcohol Abuse Mother     Heart Attack Father     Other Father         COVID    High Blood Pressure Father     High Cholesterol Father     Cancer Maternal Grandmother         breast     Heart Attack Maternal Grandfather     Cancer Paternal Grandmother         \"female region\"    Heart Attack Paternal Grandfather     Cancer Paternal Aunt     Heart Attack Paternal Uncle       Review of Systems    GEN: reviewed and negative except as noted in HPI. GI: reviewed and negative except as noted in HPI. Objective:     GEN: appears well, no distress, appears stated age  PSYCH: normal mood, normal affect  NECK: no neck masses, trachea midline  ENT: moist oral mucosa; anicteric  SKIN: no rash or jaundice  CV: regular heart rate and rhythm  PULM: normal respiratory effort, no wheezing  GI: soft non tender abdomen. Normal bowel sounds  RECTAL: examined with chaperone Keegan Solis RN. External exam only given recent thrombosis. No fissure or fistula. Right lateral thrombosed hemorrhoid with erosion and exposed clot but no infection.  Smaller left posterior external hemorrhoid without thrombosis  EXT/NEURO: normal gait, strength/sensation grossly intact in all extremities    Labs ordered by PCP 2-: not drawn yet    Assessment:     Hemorrhoids, hemorrhoid thrombosis    Plan:     Discussed RBA of various treatment options. At this point I recommend the patient supplement the diet with some fiber and avoid straining. Discussed the indications where hemorrhoidectomy may be considered and the considerable pain expectations with this operation. RBA reviewed and offered hemorrhoidectomy to remove his two hemorrhoids. He is familiar with the pain expectations from prior operation and does not want to pursue excision. Discussed OTC medications may help. Discussed in the event of future thrombosis try to get in and see me or my partner Dr. Fatemeh Wilkerson within 72 hours for clot evacuation for symptom relief. He will schedule with Dr. Italo Faria for EGD and colonoscopy.  Discussed colon cancer screening now recommended starting age 39     Lissett Carlin M.D.  2/22/23   11:31 AM

## 2023-02-22 NOTE — Clinical Note
Jimbo Caldwell,  Thanks for sending Mr. Dobbs. His hemorrhoid eroded and drained on its own.  He will call and set up EGD (for reflux) and colonoscopy (screening) with Dr. Davin Castañeda in the near future  Yisel Catalan

## 2023-03-09 DIAGNOSIS — F90.9 ATTENTION DEFICIT HYPERACTIVITY DISORDER (ADHD), UNSPECIFIED ADHD TYPE: ICD-10-CM

## 2023-03-09 RX ORDER — DEXTROAMPHETAMINE SACCHARATE, AMPHETAMINE ASPARTATE, DEXTROAMPHETAMINE SULFATE AND AMPHETAMINE SULFATE 5; 5; 5; 5 MG/1; MG/1; MG/1; MG/1
20 TABLET ORAL DAILY
Qty: 30 TABLET | Refills: 0 | Status: CANCELLED | OUTPATIENT
Start: 2023-03-09 | End: 2023-04-08

## 2023-03-09 RX ORDER — DEXTROAMPHETAMINE SACCHARATE, AMPHETAMINE ASPARTATE MONOHYDRATE, DEXTROAMPHETAMINE SULFATE AND AMPHETAMINE SULFATE 6.25; 6.25; 6.25; 6.25 MG/1; MG/1; MG/1; MG/1
25 CAPSULE, EXTENDED RELEASE ORAL EVERY MORNING
Qty: 30 CAPSULE | Refills: 0 | Status: SHIPPED | OUTPATIENT
Start: 2023-03-09 | End: 2023-04-08

## 2023-03-09 NOTE — TELEPHONE ENCOUNTER
Please note that patient takes 2 different doses of Adderall  20 mg daily and 25 mg extended release daily    I have sent in the extended release 25 mg daily to the pharmacy as requested however please check with patient that that is the one he wanted refill and not the other 20 mg regular release    Dr. Cristi Epperson

## 2023-03-09 NOTE — TELEPHONE ENCOUNTER
[facility] needs a refill on the following medication                amphetamine-dextroamphetamine (ADDERALL XR) 25 MG extended release capsule [[id number]]     Order Details  Dose: 25 mg Route: Oral Frequency: EVERY MORNING   Dispense Quantity: 30 capsule Refills: 0          Sig: Take 1 capsule by mouth every morning for 30 days.                  Pharmacy    [address] [facility], [address] [place] [address]   Phone:  [phone]  Fax:  [phone]

## 2023-03-09 NOTE — TELEPHONE ENCOUNTER
Medication:   Requested Prescriptions     Pending Prescriptions Disp Refills    amphetamine-dextroamphetamine (ADDERALL, 20MG,) 20 MG tablet 30 tablet 0     Sig: Take 1 tablet by mouth daily for 30 days. Max Daily Amount: 20 mg        Last Filled:      Patient Phone Number: [phone] (home)     Last appt: Visit date not found 02-  Next appt: Visit date not found    Last OARRS: No flowsheet data found.     Preferred Pharmacy:   [facility] [address] [place] [address]  Phone: [phone] Fax: [phone]

## 2023-03-10 NOTE — TELEPHONE ENCOUNTER
[phone] ([place]ien)   Talked with patient regarding the 25 mg. Prescription, which is the one he wants. Patient stated he will need the 20 mg. Next week and he will call.

## 2023-03-13 NOTE — TELEPHONE ENCOUNTER
Pt called for refill on the       amphetamine-dextroamphetamine (ADDERALL, 20MG,) 20 MG tablet [[id number]]    [facility] #[id number] [address] Keren Diaz [phone]   [address], [place]   Phone:  [phone]  Fax:  [phone]    Please advise

## 2023-03-15 DIAGNOSIS — F90.9 ATTENTION DEFICIT HYPERACTIVITY DISORDER (ADHD), UNSPECIFIED ADHD TYPE: ICD-10-CM

## 2023-03-15 NOTE — TELEPHONE ENCOUNTER
Patient calling back regarding  Medication Refill Request  amphetamine-dextroamphetamine (ADDERALL, 20MG,) 20 MG tablet     Last OV 2/15/2023    PLEASE SEND TO:  [facility] #[id number] [facility], [address] [place] [address] [phone]

## 2023-03-16 DIAGNOSIS — F90.9 ATTENTION DEFICIT HYPERACTIVITY DISORDER (ADHD), UNSPECIFIED ADHD TYPE: ICD-10-CM

## 2023-03-16 NOTE — TELEPHONE ENCOUNTER
Medication:   Requested Prescriptions     Pending Prescriptions Disp Refills    amphetamine-dextroamphetamine (ADDERALL, 20MG,) 20 MG tablet 30 tablet 0     Sig: Take 1 tablet by mouth daily for 30 days. Max Daily Amount: 20 mg     Last Filled:  2/15/23    Last appt: 2/15/2023   Next appt: Visit date not found    Last OARRS: No flowsheet data found.

## 2023-03-17 RX ORDER — DEXTROAMPHETAMINE SACCHARATE, AMPHETAMINE ASPARTATE, DEXTROAMPHETAMINE SULFATE AND AMPHETAMINE SULFATE 5; 5; 5; 5 MG/1; MG/1; MG/1; MG/1
20 TABLET ORAL DAILY
Qty: 30 TABLET | Refills: 0 | Status: SHIPPED | OUTPATIENT
Start: 2023-03-17 | End: 2023-04-16

## 2023-03-17 NOTE — TELEPHONE ENCOUNTER
As per chart patient 2 different doses of this medication.   The extended release 25 mg was refilled on 3/9  I think this is a refill for the 20 mg regular release  I will go ahead and refill it and send it to the pharmacy as requested    Please advise patient about this and let me know if they still have any questions or concerns    Dr. Greg Murillo

## 2023-03-29 ENCOUNTER — ANESTHESIA EVENT (OUTPATIENT)
Dept: ENDOSCOPY | Age: 48
End: 2023-03-29
Payer: OTHER GOVERNMENT

## 2023-03-29 NOTE — PROGRESS NOTES
Obstructive Sleep Apnea (AUGUSTO) Screening     Patient:  Iman Lincoln    YOB: 1975      Medical Record #:  [id number]                     Date:  3/29/2023     1. Are you a loud and/or regular snorer? []  Yes       [x] No    2. Have you been observed to gasp or stop breathing during sleep? []  Yes       [x] No    3. Do you feel tired or groggy upon awakening or do you awaken with a headache?           []  Yes       [] No    4. Are you often tired or fatigued during the wake time hours? []  Yes       [] No    5. Do you fall asleep sitting, reading, watching TV or driving? []  Yes       [] No    6. Do you often have problems with memory or concentration? []  Yes       [] No    **If patient's score is ? 3 they are considered high risk for AUGUSTO. An Anesthesia provider will evaluate the patient and develop a plan of care the day of surgery. Note:  If the patient's BMI is more than 35 kg m¯² , has neck circumference > 40 cm, and/or high blood pressure the risk is greater (© American Sleep Apnea Association, 2006).

## 2023-03-29 NOTE — PROGRESS NOTES

## 2023-03-30 ENCOUNTER — ANESTHESIA (OUTPATIENT)
Dept: ENDOSCOPY | Age: 48
End: 2023-03-30
Payer: OTHER GOVERNMENT

## 2023-03-30 ENCOUNTER — HOSPITAL ENCOUNTER (OUTPATIENT)
Age: 48
Setting detail: OUTPATIENT SURGERY
Discharge: HOME | End: 2023-03-30
Attending: INTERNAL MEDICINE | Admitting: INTERNAL MEDICINE
Payer: OTHER GOVERNMENT

## 2023-03-30 VITALS
OXYGEN SATURATION: 100 % | WEIGHT: 165 LBS | HEIGHT: 68 IN | TEMPERATURE: 97.6 F | RESPIRATION RATE: 16 BRPM | HEART RATE: 72 BPM | DIASTOLIC BLOOD PRESSURE: 95 MMHG | BODY MASS INDEX: 25.01 KG/M2 | SYSTOLIC BLOOD PRESSURE: 134 MMHG

## 2023-03-30 DIAGNOSIS — Z12.11 SCREENING FOR COLON CANCER: ICD-10-CM

## 2023-03-30 DIAGNOSIS — K21.9 GASTROESOPHAGEAL REFLUX DISEASE, UNSPECIFIED WHETHER ESOPHAGITIS PRESENT: ICD-10-CM

## 2023-03-30 PROCEDURE — 88312 SPECIAL STAINS GROUP 1: CPT

## 2023-03-30 PROCEDURE — 2580000003 HC RX 258: Performed by: ANESTHESIOLOGY

## 2023-03-30 PROCEDURE — 3700000000 HC ANESTHESIA ATTENDED CARE: Performed by: INTERNAL MEDICINE

## 2023-03-30 PROCEDURE — 3609010600 HC COLONOSCOPY POLYPECTOMY SNARE/COLD BIOPSY: Performed by: INTERNAL MEDICINE

## 2023-03-30 PROCEDURE — 6360000002 HC RX W HCPCS: Performed by: NURSE ANESTHETIST, CERTIFIED REGISTERED

## 2023-03-30 PROCEDURE — 2500000003 HC RX 250 WO HCPCS: Performed by: NURSE ANESTHETIST, CERTIFIED REGISTERED

## 2023-03-30 PROCEDURE — 2709999900 HC NON-CHARGEABLE SUPPLY: Performed by: INTERNAL MEDICINE

## 2023-03-30 PROCEDURE — 3700000001 HC ADD 15 MINUTES (ANESTHESIA): Performed by: INTERNAL MEDICINE

## 2023-03-30 PROCEDURE — 7100000010 HC PHASE II RECOVERY - FIRST 15 MIN: Performed by: INTERNAL MEDICINE

## 2023-03-30 PROCEDURE — 7100000011 HC PHASE II RECOVERY - ADDTL 15 MIN: Performed by: INTERNAL MEDICINE

## 2023-03-30 PROCEDURE — 3609012400 HC EGD TRANSORAL BIOPSY SINGLE/MULTIPLE: Performed by: INTERNAL MEDICINE

## 2023-03-30 PROCEDURE — 88305 TISSUE EXAM BY PATHOLOGIST: CPT

## 2023-03-30 RX ORDER — PROPOFOL 10 MG/ML
INJECTION, EMULSION INTRAVENOUS PRN
Status: DISCONTINUED | OUTPATIENT
Start: 2023-03-30 | End: 2023-03-30 | Stop reason: SDUPTHER

## 2023-03-30 RX ORDER — SODIUM CHLORIDE 9 MG/ML
INJECTION, SOLUTION INTRAVENOUS PRN
Status: DISCONTINUED | OUTPATIENT
Start: 2023-03-30 | End: 2023-03-30 | Stop reason: HOSPADM

## 2023-03-30 RX ORDER — LIDOCAINE HYDROCHLORIDE 10 MG/ML
0.3 INJECTION, SOLUTION EPIDURAL; INFILTRATION; INTRACAUDAL; PERINEURAL
Status: DISCONTINUED | OUTPATIENT
Start: 2023-03-30 | End: 2023-03-30 | Stop reason: HOSPADM

## 2023-03-30 RX ORDER — SODIUM CHLORIDE, SODIUM LACTATE, POTASSIUM CHLORIDE, CALCIUM CHLORIDE 600; 310; 30; 20 MG/100ML; MG/100ML; MG/100ML; MG/100ML
INJECTION, SOLUTION INTRAVENOUS CONTINUOUS
Status: DISCONTINUED | OUTPATIENT
Start: 2023-03-30 | End: 2023-03-30 | Stop reason: HOSPADM

## 2023-03-30 RX ORDER — SODIUM CHLORIDE 0.9 % (FLUSH) 0.9 %
5-40 SYRINGE (ML) INJECTION EVERY 12 HOURS SCHEDULED
Status: DISCONTINUED | OUTPATIENT
Start: 2023-03-30 | End: 2023-03-30 | Stop reason: HOSPADM

## 2023-03-30 RX ORDER — LIDOCAINE HYDROCHLORIDE 20 MG/ML
INJECTION, SOLUTION INFILTRATION; PERINEURAL PRN
Status: DISCONTINUED | OUTPATIENT
Start: 2023-03-30 | End: 2023-03-30 | Stop reason: SDUPTHER

## 2023-03-30 RX ORDER — SODIUM CHLORIDE 0.9 % (FLUSH) 0.9 %
5-40 SYRINGE (ML) INJECTION PRN
Status: DISCONTINUED | OUTPATIENT
Start: 2023-03-30 | End: 2023-03-30 | Stop reason: HOSPADM

## 2023-03-30 RX ADMIN — LIDOCAINE HYDROCHLORIDE 100 MG: 20 INJECTION, SOLUTION INFILTRATION; PERINEURAL at 11:27

## 2023-03-30 RX ADMIN — PROPOFOL 450 MG: 10 INJECTION, EMULSION INTRAVENOUS at 11:27

## 2023-03-30 RX ADMIN — SODIUM CHLORIDE, POTASSIUM CHLORIDE, SODIUM LACTATE AND CALCIUM CHLORIDE: 600; 310; 30; 20 INJECTION, SOLUTION INTRAVENOUS at 10:36

## 2023-03-30 ASSESSMENT — PAIN - FUNCTIONAL ASSESSMENT: PAIN_FUNCTIONAL_ASSESSMENT: 0-10

## 2023-03-30 ASSESSMENT — PAIN SCALES - GENERAL
PAINLEVEL_OUTOF10: 0

## 2023-03-30 NOTE — DISCHARGE INSTRUCTIONS
box to learn more about \"Upper GI Endoscopy: What to Expect at Home. \"     If you do not have an account, please click on the \"Sign Up Now\" link. Current as of: May 12, 2017  Content Version: 11.6  © 9272-8136 [employer]. Care instructions adapted under license by [facility] ([facility]). If you have questions about a medical condition or this instruction, always ask your healthcare professional. [address] any warranty or liability for your use of this information.

## 2023-03-30 NOTE — H&P
of a bad outcome should the patient contract COVID 19 during the post-procedural/post-operative period, given the patients current health condition, chronic conditions, and the added risk of COVID 19 in light of these conditions. The patient and I also discussed the risk of further postponing the procedure/surgery and other treatment alternatives, including non-procedural/surgical treatments. Understanding all of the risks, benefits, and alternatives, the patient made an informed decision to proceed with the procedure/surgery.

## 2023-03-30 NOTE — PROCEDURES
EGD: 3 mm hiatal hernia  LA grade C esophagitis  Colonoscopy: Transverse colon polyp    Plan:   Repeat colonoscopy in 5-10 years depending on polyp pathology. When the report is available, we will contact the patient and update the precise follow up interval.    Prilosec 40 mg daily    Signed By: Hill Castaneda, DO    Please note that some or all of this record was generated using voice recognition software. If there are any questions about the content of this document, please contact the author as some errors in transcription may have occurred.

## 2023-03-30 NOTE — ANESTHESIA POSTPROCEDURE EVALUATION
Department of Anesthesiology  Postprocedure Note    Patient: Jeannie Neal  MRN: [id number]  YOB: 1975  Date of evaluation: 3/30/2023      Procedure Summary     Date: 03/30/23 Room / Location: [facility] [address]    Anesthesia Start: 5942 Anesthesia Stop: 1154    Procedures:       COLONOSCOPY POLYPECTOMY SNARE/COLD BIOPSY      EGD BIOPSY Diagnosis:       Screening for colon cancer      Gastroesophageal reflux disease, unspecified whether esophagitis present      (SCREENING FOR COLON CANCER AND GERD)    Surgeons: Dominick Gonzalez DO Responsible Provider: Adolfo Daniels DO    Anesthesia Type: general ASA Status: 2          Anesthesia Type: No value filed.     Levar Phase I: Levar Score: 10    Levar Phase II:        Anesthesia Post Evaluation    Patient location during evaluation: PACU  Patient participation: complete - patient participated  Level of consciousness: awake and alert  Pain score: 0  Airway patency: patent  Nausea & Vomiting: no nausea and no vomiting  Complications: no  Cardiovascular status: blood pressure returned to baseline and hemodynamically stable  Respiratory status: acceptable and room air  Hydration status: euvolemic

## 2023-03-30 NOTE — PROGRESS NOTES
Awake and alert with no complaints. VS stable. Discharge instructions reviewed with patient/responsible adult and understanding verbalized. Discharge instructions signed and copies given. Discharge criteria met per hospital policy. Patient discharged home with belongings.

## 2023-03-30 NOTE — ANESTHESIA PRE PROCEDURE
2       Induction: intravenous. continuous noninvasive hemodynamic monitor    Anesthetic plan and risks discussed with patient. Echo   STRESS  2022   Baseline resting echocardiogram shows normal global LV systolic function   with an ejection fraction of 60% and uniform myocardial segmental wall   motion. Following stress there was uniform augmentation of all myocardial   segments with appropriate hyperdynamic LV systolic response to stress. ECG   <0.5mm ST segment depression. Normal exercise stress EKG portion. Arrhythmias   No evidence for significant arrhythmia. Symptoms   No symptoms with exercise.       ADDITIONAL INFORMATION / SUMMARY LIST  ADJUSTMENT DISORDER WITH ANXIETY  PTSD,  DEPRESSION  LOW BACK PAIN  NON SMOKER  SLEEP APNEA  HYPERTENSION  UROLITHIASIS    Melum 50, DO   3/30/2023

## 2023-04-06 DIAGNOSIS — F90.9 ATTENTION DEFICIT HYPERACTIVITY DISORDER (ADHD), UNSPECIFIED ADHD TYPE: ICD-10-CM

## 2023-04-06 RX ORDER — DEXTROAMPHETAMINE SACCHARATE, AMPHETAMINE ASPARTATE MONOHYDRATE, DEXTROAMPHETAMINE SULFATE AND AMPHETAMINE SULFATE 6.25; 6.25; 6.25; 6.25 MG/1; MG/1; MG/1; MG/1
25 CAPSULE, EXTENDED RELEASE ORAL EVERY MORNING
Qty: 30 CAPSULE | Refills: 0 | Status: SHIPPED | OUTPATIENT
Start: 2023-04-06 | End: 2023-05-06

## 2023-04-06 NOTE — TELEPHONE ENCOUNTER
Medication:   Requested Prescriptions     Pending Prescriptions Disp Refills    amphetamine-dextroamphetamine (ADDERALL XR) 25 MG extended release capsule 30 capsule 0     Sig: Take 1 capsule by mouth every morning for 30 days. Last Filled:  3/9/23    Patient Phone Number: [phone] (home)     Last appt: Visit date not found   Next appt: Visit date not found    Last OARRS: No flowsheet data found.

## 2023-05-03 DIAGNOSIS — F90.9 ATTENTION DEFICIT HYPERACTIVITY DISORDER (ADHD), UNSPECIFIED ADHD TYPE: ICD-10-CM

## 2023-05-03 RX ORDER — DEXTROAMPHETAMINE SACCHARATE, AMPHETAMINE ASPARTATE MONOHYDRATE, DEXTROAMPHETAMINE SULFATE AND AMPHETAMINE SULFATE 6.25; 6.25; 6.25; 6.25 MG/1; MG/1; MG/1; MG/1
25 CAPSULE, EXTENDED RELEASE ORAL EVERY MORNING
Qty: 30 CAPSULE | Refills: 0 | Status: SHIPPED | OUTPATIENT
Start: 2023-05-03 | End: 2023-06-14 | Stop reason: SDUPTHER

## 2023-05-16 DIAGNOSIS — F90.9 ATTENTION DEFICIT HYPERACTIVITY DISORDER (ADHD), UNSPECIFIED ADHD TYPE: ICD-10-CM

## 2023-05-16 RX ORDER — DEXTROAMPHETAMINE SACCHARATE, AMPHETAMINE ASPARTATE, DEXTROAMPHETAMINE SULFATE AND AMPHETAMINE SULFATE 5; 5; 5; 5 MG/1; MG/1; MG/1; MG/1
20 TABLET ORAL DAILY
Qty: 30 TABLET | Refills: 0 | OUTPATIENT
Start: 2023-05-16 | End: 2023-06-15

## 2023-05-17 ENCOUNTER — OFFICE VISIT (OUTPATIENT)
Dept: PRIMARY CARE CLINIC | Age: 48
End: 2023-05-17
Payer: OTHER GOVERNMENT

## 2023-05-17 VITALS
HEART RATE: 102 BPM | DIASTOLIC BLOOD PRESSURE: 90 MMHG | RESPIRATION RATE: 13 BRPM | SYSTOLIC BLOOD PRESSURE: 144 MMHG | OXYGEN SATURATION: 98 % | BODY MASS INDEX: 26.15 KG/M2 | TEMPERATURE: 97.3 F | WEIGHT: 172 LBS

## 2023-05-17 DIAGNOSIS — Z00.00 ANNUAL PHYSICAL EXAM: ICD-10-CM

## 2023-05-17 DIAGNOSIS — E78.5 HYPERLIPIDEMIA, UNSPECIFIED HYPERLIPIDEMIA TYPE: ICD-10-CM

## 2023-05-17 DIAGNOSIS — Z11.3 SCREEN FOR STD (SEXUALLY TRANSMITTED DISEASE): ICD-10-CM

## 2023-05-17 DIAGNOSIS — F90.9 ATTENTION DEFICIT HYPERACTIVITY DISORDER (ADHD), UNSPECIFIED ADHD TYPE: Primary | ICD-10-CM

## 2023-05-17 DIAGNOSIS — F41.9 ANXIETY: ICD-10-CM

## 2023-05-17 DIAGNOSIS — Z13.1 DIABETES MELLITUS SCREENING: ICD-10-CM

## 2023-05-17 DIAGNOSIS — R03.0 ELEVATED BLOOD PRESSURE READING: ICD-10-CM

## 2023-05-17 DIAGNOSIS — F43.10 PTSD (POST-TRAUMATIC STRESS DISORDER): ICD-10-CM

## 2023-05-17 LAB
ALBUMIN SERPL-MCNC: 5.1 G/DL (ref 3.4–5)
ALBUMIN/GLOB SERPL: 2.3 {RATIO} (ref 1.1–2.2)
ALP SERPL-CCNC: 38 U/L (ref 40–129)
ALT SERPL-CCNC: 38 U/L (ref 10–40)
ANION GAP SERPL CALCULATED.3IONS-SCNC: 14 MMOL/L (ref 3–16)
AST SERPL-CCNC: 32 U/L (ref 15–37)
BASOPHILS # BLD: 0 K/UL (ref 0–0.2)
BASOPHILS NFR BLD: 0.4 %
BILIRUB SERPL-MCNC: 0.4 MG/DL (ref 0–1)
BUN SERPL-MCNC: 11 MG/DL (ref 7–20)
CALCIUM SERPL-MCNC: 9.6 MG/DL (ref 8.3–10.6)
CHLORIDE SERPL-SCNC: 99 MMOL/L (ref 99–110)
CHOLEST SERPL-MCNC: 260 MG/DL (ref 0–199)
CO2 SERPL-SCNC: 28 MMOL/L (ref 21–32)
CREAT SERPL-MCNC: 1.2 MG/DL (ref 0.9–1.3)
DEPRECATED RDW RBC AUTO: 15 % (ref 12.4–15.4)
EOSINOPHIL # BLD: 0.1 K/UL (ref 0–0.6)
EOSINOPHIL NFR BLD: 1.4 %
EST. AVERAGE GLUCOSE BLD GHB EST-MCNC: 99.7 MG/DL
GFR SERPLBLD CREATININE-BSD FMLA CKD-EPI: >60 ML/MIN/{1.73_M2}
GLUCOSE SERPL-MCNC: 104 MG/DL (ref 70–99)
HAV IGM SERPL QL IA: NORMAL
HBA1C MFR BLD: 5.1 %
HBV CORE IGM SERPL QL IA: NORMAL
HBV SURFACE AG SERPL QL IA: NORMAL
HCT VFR BLD AUTO: 43.7 % (ref 40.5–52.5)
HCV AB SERPL QL IA: NORMAL
HDLC SERPL-MCNC: 64 MG/DL (ref 40–60)
HERPES SIMPLEX VIRUS 1 IGG: POSITIVE
HERPES SIMPLEX VIRUS 2 IGG: POSITIVE
HGB BLD-MCNC: 15 G/DL (ref 13.5–17.5)
HIV 1+2 AB+HIV1 P24 AG SERPL QL IA: NORMAL
HIV 2 AB SERPL QL IA: NORMAL
HIV1 AB SERPL QL IA: NORMAL
HIV1 P24 AG SERPL QL IA: NORMAL
LDLC SERPL CALC-MCNC: 175 MG/DL
LYMPHOCYTES # BLD: 1 K/UL (ref 1–5.1)
LYMPHOCYTES NFR BLD: 26 %
MCH RBC QN AUTO: 27.9 PG (ref 26–34)
MCHC RBC AUTO-ENTMCNC: 34.4 G/DL (ref 31–36)
MCV RBC AUTO: 81.3 FL (ref 80–100)
MONOCYTES # BLD: 0.4 K/UL (ref 0–1.3)
MONOCYTES NFR BLD: 10.3 %
NEUTROPHILS # BLD: 2.4 K/UL (ref 1.7–7.7)
NEUTROPHILS NFR BLD: 61.9 %
PLATELET # BLD AUTO: 178 K/UL (ref 135–450)
PMV BLD AUTO: 8.9 FL (ref 5–10.5)
POTASSIUM SERPL-SCNC: 4.3 MMOL/L (ref 3.5–5.1)
PROT SERPL-MCNC: 7.3 G/DL (ref 6.4–8.2)
RBC # BLD AUTO: 5.37 M/UL (ref 4.2–5.9)
REAGIN+T PALLIDUM IGG+IGM SERPL-IMP: NORMAL
SODIUM SERPL-SCNC: 141 MMOL/L (ref 136–145)
SPECIMEN TYPE: NORMAL
TRICHOMONAS VAGINALIS SCREEN: NEGATIVE
TRIGL SERPL-MCNC: 107 MG/DL (ref 0–150)
TSH SERPL DL<=0.005 MIU/L-ACNC: 3.84 UIU/ML (ref 0.27–4.2)
VLDLC SERPL CALC-MCNC: 21 MG/DL
WBC # BLD AUTO: 3.8 K/UL (ref 4–11)

## 2023-05-17 PROCEDURE — 3077F SYST BP >= 140 MM HG: CPT | Performed by: FAMILY MEDICINE

## 2023-05-17 PROCEDURE — 99214 OFFICE O/P EST MOD 30 MIN: CPT | Performed by: FAMILY MEDICINE

## 2023-05-17 PROCEDURE — 3080F DIAST BP >= 90 MM HG: CPT | Performed by: FAMILY MEDICINE

## 2023-05-17 RX ORDER — DEXTROAMPHETAMINE SACCHARATE, AMPHETAMINE ASPARTATE, DEXTROAMPHETAMINE SULFATE AND AMPHETAMINE SULFATE 5; 5; 5; 5 MG/1; MG/1; MG/1; MG/1
20 TABLET ORAL DAILY
Qty: 30 TABLET | Refills: 0 | Status: SHIPPED | OUTPATIENT
Start: 2023-05-17 | End: 2023-06-16

## 2023-05-17 ASSESSMENT — ENCOUNTER SYMPTOMS
VOMITING: 0
EYE ITCHING: 0
DIARRHEA: 0
NAUSEA: 0
EYE DISCHARGE: 0
SORE THROAT: 0
SHORTNESS OF BREATH: 0
ABDOMINAL PAIN: 0
TROUBLE SWALLOWING: 0
COUGH: 0

## 2023-05-17 NOTE — ASSESSMENT & PLAN NOTE
Blood pressure high, history of hypertension and family. Discussed checking blood pressure outside of the clinic setting, goal discussed.    Patient will call and let us know if his blood pressure outside of clinic is consistently above goal.  Hydrochlorothiazide discussed today but not prescribed

## 2023-05-17 NOTE — PROGRESS NOTES
Oksana Gama (:  1975) is a 50 y.o. male,Established patient, here for evaluation of the following chief complaint(s):  ADHD      ASSESSMENT/PLAN:  1. Attention deficit hyperactivity disorder (ADHD), unspecified ADHD type  Assessment & Plan:   Well-controlled, continue current medications  Orders:  -     amphetamine-dextroamphetamine (ADDERALL, 20MG,) 20 MG tablet; Take 1 tablet by mouth daily for 30 days. Max Daily Amount: 20 mg, Disp-30 tablet, R-0Normal  2. Elevated blood pressure reading  Assessment & Plan:   Blood pressure high, history of hypertension and family. Discussed checking blood pressure outside of the clinic setting, goal discussed. Patient will call and let us know if his blood pressure outside of clinic is consistently above goal.  Hydrochlorothiazide discussed today but not prescribed  3. Screen for STD (sexually transmitted disease)  -     C.trachomatis N.gonorrhoeae DNA, Urine; Future  -     Trichomonas by EIA; Future  -     HIV Screen; Future  -     Syphilis Antibody Cascading Reflex; Future  -     Herpes Simplex Virus,I/II,IgG; Future  -     Hepatitis Panel, Acute; Future      Return in about 3 months (around 2023), or if symptoms worsen or fail to improve, for ADHD . SUBJECTIVE/OBJECTIVE:  HPI    ADHD  - on 2 diff doses for the day  - having a hard time getting these filled   - just picked up the 25 mg,     Elevated BP  - rushing to get here  - 142/100, repeat 144/90    STD check  -Used to get STD check while he was in the [employer]  -Has had unprotected sex since then  -Is not in any relationship and wants to make sure that he is overall healthy specially in terms of STDs    Review of Systems   Constitutional:  Negative for appetite change, chills, fatigue and fever. HENT:  Negative for congestion, ear pain, sneezing, sore throat and trouble swallowing. Eyes:  Negative for discharge and itching. Respiratory:  Negative for cough and shortness of breath.

## 2023-05-18 LAB
C TRACH DNA UR QL NAA+PROBE: NEGATIVE
N GONORRHOEA DNA UR QL NAA+PROBE: NEGATIVE

## 2023-05-19 ENCOUNTER — TELEMEDICINE (OUTPATIENT)
Dept: PRIMARY CARE CLINIC | Age: 48
End: 2023-05-19
Payer: OTHER GOVERNMENT

## 2023-05-19 DIAGNOSIS — R89.4 HERPES SIMPLEX ANTIBODY POSITIVE: ICD-10-CM

## 2023-05-19 DIAGNOSIS — E78.5 HYPERLIPIDEMIA, UNSPECIFIED HYPERLIPIDEMIA TYPE: Primary | ICD-10-CM

## 2023-05-19 PROCEDURE — 99213 OFFICE O/P EST LOW 20 MIN: CPT | Performed by: FAMILY MEDICINE

## 2023-05-19 RX ORDER — VALACYCLOVIR HYDROCHLORIDE 1 G/1
1000 TABLET, FILM COATED ORAL DAILY
Qty: 5 TABLET | Refills: 3 | Status: SHIPPED | OUTPATIENT
Start: 2023-05-19 | End: 2023-05-24

## 2023-05-19 ASSESSMENT — ENCOUNTER SYMPTOMS
SHORTNESS OF BREATH: 0
ABDOMINAL PAIN: 0
VOMITING: 0
EYE ITCHING: 0
SORE THROAT: 0
TROUBLE SWALLOWING: 0
DIARRHEA: 0
EYE DISCHARGE: 0
NAUSEA: 0
COUGH: 0

## 2023-05-19 NOTE — PROGRESS NOTES
Fernando Davison (:  1975) is a Established patient, here for evaluation of the following:    Below is the assessment and plan developed based on review of pertinent history, physical exam, labs, studies, and medications. 1. Hyperlipidemia, unspecified hyperlipidemia type  2. Herpes simplex antibody positive  -     valACYclovir (VALTREX) 1 g tablet; Take 1 tablet by mouth daily for 5 days, Disp-5 tablet, R-3Normal    Discussed results of HSV IgG positive, no active genital lesions  Discussed barrier protection with intercourse  Valtrex prescribed, patient to also keep some on hand if needed, refills given    Return if symptoms worsen or fail to improve. HPI    Hyperlipidemia   - discussed with patient regarding his risk score  -Watch diet and lifestyle changes    The 10-year ASCVD risk score (Johanny CHRISTOPHER, et al., 2019) is: 3.9%    Values used to calculate the score:      Age: 50 years      Sex: Male      Is Non- : No      Diabetic: No      Tobacco smoker: No      Systolic Blood Pressure: 855 mmHg      Is BP treated: No      HDL Cholesterol: 64 mg/dL      Total Cholesterol: 260 mg/dL    HSV 1 and 2 IGG positive  - discussed results  -Patient reported that he might have some lesions on his lower lip due to \"stress\"         Review of Systems   Constitutional:  Negative for appetite change, chills, fatigue and fever. HENT:  Negative for congestion, ear pain, sneezing, sore throat and trouble swallowing. Eyes:  Negative for discharge and itching. Respiratory:  Negative for cough and shortness of breath. Cardiovascular:  Negative for chest pain and leg swelling. Gastrointestinal:  Negative for abdominal pain, diarrhea, nausea and vomiting. Genitourinary:  Negative for dysuria and urgency. Musculoskeletal:  Negative for joint swelling and neck pain. Skin:         Small spots of the lower lip/chin   Neurological:  Negative for light-headedness and headaches.

## 2023-07-17 DIAGNOSIS — F90.9 ATTENTION DEFICIT HYPERACTIVITY DISORDER (ADHD), UNSPECIFIED ADHD TYPE: ICD-10-CM

## 2023-07-18 RX ORDER — DEXTROAMPHETAMINE SACCHARATE, AMPHETAMINE ASPARTATE MONOHYDRATE, DEXTROAMPHETAMINE SULFATE AND AMPHETAMINE SULFATE 6.25; 6.25; 6.25; 6.25 MG/1; MG/1; MG/1; MG/1
25 CAPSULE, EXTENDED RELEASE ORAL EVERY MORNING
Qty: 30 CAPSULE | Refills: 0 | Status: SHIPPED | OUTPATIENT
Start: 2023-07-18 | End: 2023-08-17

## 2023-07-18 RX ORDER — DEXTROAMPHETAMINE SACCHARATE, AMPHETAMINE ASPARTATE, DEXTROAMPHETAMINE SULFATE AND AMPHETAMINE SULFATE 5; 5; 5; 5 MG/1; MG/1; MG/1; MG/1
20 TABLET ORAL DAILY
Qty: 30 TABLET | Refills: 0 | Status: SHIPPED | OUTPATIENT
Start: 2023-07-18 | End: 2023-08-17

## 2023-07-18 NOTE — TELEPHONE ENCOUNTER
Medication:   Requested Prescriptions     Pending Prescriptions Disp Refills    amphetamine-dextroamphetamine (ADDERALL XR) 25 MG extended release capsule 30 capsule 0     Sig: Take 1 capsule by mouth every morning for 30 days. amphetamine-dextroamphetamine (ADDERALL, 20MG,) 20 MG tablet 30 tablet 0     Sig: Take 1 tablet by mouth daily for 30 days. Max Daily Amount: 20 mg     Last Filled:  6.15.23 6.16.23    Last appt: 5/17/23   Next appt: Visit date not found    Last OARRS:   RX Monitoring 6/15/2023   Periodic Controlled Substance Monitoring No signs of potential drug abuse or diversion identified.

## 2023-08-15 DIAGNOSIS — F90.9 ATTENTION DEFICIT HYPERACTIVITY DISORDER (ADHD), UNSPECIFIED ADHD TYPE: ICD-10-CM

## 2023-08-16 RX ORDER — DEXTROAMPHETAMINE SACCHARATE, AMPHETAMINE ASPARTATE, DEXTROAMPHETAMINE SULFATE AND AMPHETAMINE SULFATE 5; 5; 5; 5 MG/1; MG/1; MG/1; MG/1
20 TABLET ORAL DAILY
Qty: 30 TABLET | Refills: 0 | Status: SHIPPED | OUTPATIENT
Start: 2023-08-16 | End: 2023-09-15

## 2023-08-16 RX ORDER — DEXTROAMPHETAMINE SACCHARATE, AMPHETAMINE ASPARTATE MONOHYDRATE, DEXTROAMPHETAMINE SULFATE AND AMPHETAMINE SULFATE 6.25; 6.25; 6.25; 6.25 MG/1; MG/1; MG/1; MG/1
25 CAPSULE, EXTENDED RELEASE ORAL EVERY MORNING
Qty: 30 CAPSULE | Refills: 0 | Status: SHIPPED | OUTPATIENT
Start: 2023-08-16 | End: 2023-09-15

## 2023-08-16 NOTE — TELEPHONE ENCOUNTER
Pt will need an appt for Control Substance (every 3 months) by Sept before the next refill    Can be VV or OV

## 2023-08-29 NOTE — TELEPHONE ENCOUNTER
Received call from patient requesting the following:  amphetamine-dextroamphetamine (ADDERALL XR) 25 MG extended release capsule    amphetamine-dextroamphetamine (ADDERALL, 10MG,) 10 MG tablet     Last Office Visit  -  12/6/21  Next Office Visit  -  1/3/22 No

## 2023-09-14 DIAGNOSIS — F90.9 ATTENTION DEFICIT HYPERACTIVITY DISORDER (ADHD), UNSPECIFIED ADHD TYPE: ICD-10-CM

## 2023-09-14 RX ORDER — DEXTROAMPHETAMINE SACCHARATE, AMPHETAMINE ASPARTATE MONOHYDRATE, DEXTROAMPHETAMINE SULFATE AND AMPHETAMINE SULFATE 6.25; 6.25; 6.25; 6.25 MG/1; MG/1; MG/1; MG/1
25 CAPSULE, EXTENDED RELEASE ORAL EVERY MORNING
Qty: 30 CAPSULE | Refills: 0 | Status: SHIPPED | OUTPATIENT
Start: 2023-09-14 | End: 2023-10-14

## 2023-09-14 RX ORDER — DEXTROAMPHETAMINE SACCHARATE, AMPHETAMINE ASPARTATE, DEXTROAMPHETAMINE SULFATE AND AMPHETAMINE SULFATE 5; 5; 5; 5 MG/1; MG/1; MG/1; MG/1
20 TABLET ORAL DAILY
Qty: 30 TABLET | Refills: 0 | Status: SHIPPED | OUTPATIENT
Start: 2023-09-14 | End: 2023-10-14

## 2023-09-14 NOTE — TELEPHONE ENCOUNTER
Patient was advised to make an appointment in August, still no appointment.   There will be absolutely no further refills after this month if no further appointments are made  Please advise patient and have him make a video visit versus in person visit, okay with either   Dr. aZne Mahoney

## 2023-09-19 ENCOUNTER — OFFICE VISIT (OUTPATIENT)
Dept: PRIMARY CARE CLINIC | Age: 48
End: 2023-09-19
Payer: OTHER GOVERNMENT

## 2023-09-19 VITALS
RESPIRATION RATE: 16 BRPM | DIASTOLIC BLOOD PRESSURE: 88 MMHG | BODY MASS INDEX: 25.67 KG/M2 | SYSTOLIC BLOOD PRESSURE: 138 MMHG | WEIGHT: 168.8 LBS | HEART RATE: 89 BPM | TEMPERATURE: 98.5 F | OXYGEN SATURATION: 99 %

## 2023-09-19 DIAGNOSIS — S83.92XA SPRAIN OF LEFT KNEE, UNSPECIFIED LIGAMENT, INITIAL ENCOUNTER: ICD-10-CM

## 2023-09-19 DIAGNOSIS — F90.9 ATTENTION DEFICIT HYPERACTIVITY DISORDER (ADHD), UNSPECIFIED ADHD TYPE: Primary | ICD-10-CM

## 2023-09-19 PROCEDURE — 3079F DIAST BP 80-89 MM HG: CPT | Performed by: FAMILY MEDICINE

## 2023-09-19 PROCEDURE — 3075F SYST BP GE 130 - 139MM HG: CPT | Performed by: FAMILY MEDICINE

## 2023-09-19 PROCEDURE — 99214 OFFICE O/P EST MOD 30 MIN: CPT | Performed by: FAMILY MEDICINE

## 2023-09-19 RX ORDER — GABAPENTIN 300 MG/1
300 CAPSULE ORAL NIGHTLY
Qty: 30 CAPSULE | Refills: 0 | Status: SHIPPED | OUTPATIENT
Start: 2023-09-19 | End: 2023-10-19

## 2023-09-19 ASSESSMENT — ENCOUNTER SYMPTOMS
COUGH: 0
SORE THROAT: 0
ABDOMINAL PAIN: 0
SHORTNESS OF BREATH: 0
NAUSEA: 0
VOMITING: 0

## 2023-09-19 NOTE — PROGRESS NOTES
rhythm. Heart sounds: Normal heart sounds. No murmur heard. Pulmonary:      Effort: Pulmonary effort is normal.      Breath sounds: Normal breath sounds. No wheezing. Musculoskeletal:         General: Tenderness (Of the left knee above the patella with palpation. Some tenderness of the left knee below the patella with palpation) present. No swelling. Normal range of motion. Cervical back: Normal range of motion and neck supple. Comments: Negative anterior and posterior drawer sign  No tenderness with internal/external rotation  Some tenderness with hyper extension of the left knee   Skin:     General: Skin is warm and dry. Neurological:      General: No focal deficit present. Mental Status: He is alert and oriented to person, place, and time. Psychiatric:         Mood and Affect: Mood normal.         Behavior: Behavior normal.           An electronic signature was used to authenticate this note.     --Daya Ching MD

## 2023-10-18 DIAGNOSIS — F90.9 ATTENTION DEFICIT HYPERACTIVITY DISORDER (ADHD), UNSPECIFIED ADHD TYPE: ICD-10-CM

## 2023-10-18 RX ORDER — DEXTROAMPHETAMINE SACCHARATE, AMPHETAMINE ASPARTATE, DEXTROAMPHETAMINE SULFATE AND AMPHETAMINE SULFATE 5; 5; 5; 5 MG/1; MG/1; MG/1; MG/1
20 TABLET ORAL DAILY
Qty: 30 TABLET | Refills: 0 | Status: SHIPPED | OUTPATIENT
Start: 2023-10-18 | End: 2023-11-17

## 2023-10-18 RX ORDER — DEXTROAMPHETAMINE SACCHARATE, AMPHETAMINE ASPARTATE MONOHYDRATE, DEXTROAMPHETAMINE SULFATE AND AMPHETAMINE SULFATE 6.25; 6.25; 6.25; 6.25 MG/1; MG/1; MG/1; MG/1
25 CAPSULE, EXTENDED RELEASE ORAL EVERY MORNING
Qty: 30 CAPSULE | Refills: 0 | Status: SHIPPED | OUTPATIENT
Start: 2023-10-18 | End: 2023-11-17

## 2023-10-18 NOTE — TELEPHONE ENCOUNTER
Patient called and wanted to make sure about the refill of following medications urgently    amphetamine-dextroamphetamine (ADDERALL XR) 25 MG extended release capsule [[id number]]       amphetamine-dextroamphetamine (ADDERALL, 20MG,) 20 MG tablet [[id number]      The pharmacy      [address]. [facility], [address] [facility] [phone]   [address] [place] [address], [facility] [address]   Phone:  [phone]  Fax:  [phone]    Please review     Thanks

## 2023-10-18 NOTE — TELEPHONE ENCOUNTER
Medication:   Requested Prescriptions     Pending Prescriptions Disp Refills    amphetamine-dextroamphetamine (ADDERALL XR) 25 MG extended release capsule 30 capsule 0     Sig: Take 1 capsule by mouth every morning for 30 days. amphetamine-dextroamphetamine (ADDERALL, 20MG,) 20 MG tablet 30 tablet 0     Sig: Take 1 tablet by mouth daily for 30 days.  Max Daily Amount: 20 mg     Last Filled:  9/14/23    Last appt: 9/19/2023   Next appt: 12/19/2023    Last Labs DM:   Lab Results   Component Value Date/Time    LABA1C 5.1 05/17/2023 08:44 AM     Last Lipid:   Lab Results   Component Value Date/Time    CHOL 260 05/17/2023 08:44 AM    TRIG 107 05/17/2023 08:44 AM    HDL 64 05/17/2023 08:44 AM    LDLCALC 175 05/17/2023 08:44 AM     Last PSA: No results found for: \"PSA\"  Last Thyroid: No results found for: \"TSH\", \"FT3\", \"D5GWGWB\", \"T4FREE\", \"X2UVSID\"

## 2023-11-16 ENCOUNTER — TELEMEDICINE (OUTPATIENT)
Dept: PRIMARY CARE CLINIC | Age: 48
End: 2023-11-16
Payer: OTHER GOVERNMENT

## 2023-11-16 DIAGNOSIS — F90.9 ATTENTION DEFICIT HYPERACTIVITY DISORDER (ADHD), UNSPECIFIED ADHD TYPE: ICD-10-CM

## 2023-11-16 DIAGNOSIS — G47.10 HYPERSOMNIA: ICD-10-CM

## 2023-11-16 DIAGNOSIS — R68.82 DECREASED LIBIDO: ICD-10-CM

## 2023-11-16 DIAGNOSIS — R53.83 OTHER FATIGUE: ICD-10-CM

## 2023-11-16 DIAGNOSIS — R79.89 DECREASED TESTOSTERONE LEVEL: Primary | ICD-10-CM

## 2023-11-16 PROCEDURE — 99213 OFFICE O/P EST LOW 20 MIN: CPT | Performed by: FAMILY MEDICINE

## 2023-11-16 ASSESSMENT — ENCOUNTER SYMPTOMS
SHORTNESS OF BREATH: 0
TROUBLE SWALLOWING: 0
DIARRHEA: 0
EYE ITCHING: 0
VOMITING: 0
NAUSEA: 0
SORE THROAT: 0
EYE DISCHARGE: 0
COUGH: 0
ABDOMINAL PAIN: 0

## 2023-11-16 NOTE — TELEPHONE ENCOUNTER
Medication:   Requested Prescriptions     Pending Prescriptions Disp Refills    amphetamine-dextroamphetamine (ADDERALL, 20MG,) 20 MG tablet 30 tablet 0     Sig: Take 1 tablet by mouth daily for 30 days. Max Daily Amount: 20 mg        Last Filled:      Patient Phone Number: [phone] (home)     Last appt: 9/19/2023   Next appt: 11/16/2023    Last OARRS:       9/14/2023     1:01 PM   RX Monitoring   Periodic Controlled Substance Monitoring No signs of potential drug abuse or diversion identified. Preferred Pharmacy:   [facility] [address] [place] [address]  Phone: [phone] Fax: [phone]  Medication:   Requested Prescriptions     Pending Prescriptions Disp Refills    amphetamine-dextroamphetamine (ADDERALL, 20MG,) 20 MG tablet 30 tablet 0     Sig: Take 1 tablet by mouth daily for 30 days. Max Daily Amount: 20 mg        Last Filled:      Patient Phone Number: [phone] (home)     Last appt: 9/19/2023   Next appt: 11/16/2023    Last OARRS:       9/14/2023     1:01 PM   RX Monitoring   Periodic Controlled Substance Monitoring No signs of potential drug abuse or diversion identified.        Preferred Pharmacy:   [facility] [address] [place] [address] Street [address]  Phone: [phone] Fax: [phone]

## 2023-11-17 RX ORDER — DEXTROAMPHETAMINE SACCHARATE, AMPHETAMINE ASPARTATE, DEXTROAMPHETAMINE SULFATE AND AMPHETAMINE SULFATE 5; 5; 5; 5 MG/1; MG/1; MG/1; MG/1
20 TABLET ORAL DAILY
Qty: 30 TABLET | Refills: 0 | Status: SHIPPED | OUTPATIENT
Start: 2023-11-17 | End: 2023-12-17

## 2023-11-19 DIAGNOSIS — F90.9 ATTENTION DEFICIT HYPERACTIVITY DISORDER (ADHD), UNSPECIFIED ADHD TYPE: ICD-10-CM

## 2023-11-20 ENCOUNTER — TELEPHONE (OUTPATIENT)
Dept: PRIMARY CARE CLINIC | Age: 48
End: 2023-11-20

## 2023-11-20 DIAGNOSIS — F90.9 ATTENTION DEFICIT HYPERACTIVITY DISORDER (ADHD), UNSPECIFIED ADHD TYPE: ICD-10-CM

## 2023-11-20 DIAGNOSIS — F90.2 ATTENTION DEFICIT HYPERACTIVITY DISORDER (ADHD), COMBINED TYPE: ICD-10-CM

## 2023-11-20 RX ORDER — DEXTROAMPHETAMINE SACCHARATE, AMPHETAMINE ASPARTATE, DEXTROAMPHETAMINE SULFATE AND AMPHETAMINE SULFATE 5; 5; 5; 5 MG/1; MG/1; MG/1; MG/1
20 TABLET ORAL DAILY
Qty: 30 TABLET | Refills: 0 | OUTPATIENT
Start: 2023-11-20 | End: 2023-12-20

## 2023-11-20 RX ORDER — DEXTROAMPHETAMINE SACCHARATE, AMPHETAMINE ASPARTATE MONOHYDRATE, DEXTROAMPHETAMINE SULFATE AND AMPHETAMINE SULFATE 6.25; 6.25; 6.25; 6.25 MG/1; MG/1; MG/1; MG/1
25 CAPSULE, EXTENDED RELEASE ORAL EVERY MORNING
Qty: 30 CAPSULE | Refills: 0 | Status: SHIPPED | OUTPATIENT
Start: 2023-11-20 | End: 2023-12-20

## 2023-11-20 RX ORDER — DEXTROAMPHETAMINE SACCHARATE, AMPHETAMINE ASPARTATE, DEXTROAMPHETAMINE SULFATE AND AMPHETAMINE SULFATE 5; 5; 5; 5 MG/1; MG/1; MG/1; MG/1
20 TABLET ORAL DAILY
Qty: 30 TABLET | Refills: 0 | Status: SHIPPED | OUTPATIENT
Start: 2023-11-20 | End: 2023-12-20

## 2023-12-15 ENCOUNTER — PATIENT MESSAGE (OUTPATIENT)
Dept: PRIMARY CARE CLINIC | Age: 48
End: 2023-12-15

## 2023-12-15 DIAGNOSIS — F90.9 ATTENTION DEFICIT HYPERACTIVITY DISORDER (ADHD), UNSPECIFIED ADHD TYPE: ICD-10-CM

## 2023-12-15 DIAGNOSIS — F90.2 ATTENTION DEFICIT HYPERACTIVITY DISORDER (ADHD), COMBINED TYPE: ICD-10-CM

## 2023-12-15 NOTE — TELEPHONE ENCOUNTER
Medication:   Requested Prescriptions     Pending Prescriptions Disp Refills    amphetamine-dextroamphetamine (ADDERALL XR) 25 MG extended release capsule 30 capsule 0     Sig: Take 1 capsule by mouth every morning for 30 days. amphetamine-dextroamphetamine (ADDERALL, 20MG,) 20 MG tablet 30 tablet 0     Sig: Take 1 tablet by mouth daily for 30 days. Max Daily Amount: 20 mg     Last Filled:  XR: 11/20/2023  Adderall 20: 11/20/2023    Last appt: 11/16/2023   Next appt: 12/19/2023    Last OARRS:       11/17/2023     7:48 AM   RX Monitoring   Periodic Controlled Substance Monitoring Potential drug abuse or diversion identified, see note documentation.

## 2023-12-15 NOTE — TELEPHONE ENCOUNTER
From: Chidi Dobbs  To: Dr. Paulette Schwartz  Sent: 12/15/2023 8:44 AM EST  Subject: Urology referral    hi Doc. I called [facility] a few times to check on the status of my urology referral authorization and [facility] has stated that nothings been submitted via your office. They have not received any paperwork or the request… Can you have someone please look into this because you mentioned that you were putting in a referral for me to be seen During our last appointment at a urology group.

## 2023-12-17 RX ORDER — DEXTROAMPHETAMINE SACCHARATE, AMPHETAMINE ASPARTATE, DEXTROAMPHETAMINE SULFATE AND AMPHETAMINE SULFATE 5; 5; 5; 5 MG/1; MG/1; MG/1; MG/1
20 TABLET ORAL DAILY
Qty: 30 TABLET | Refills: 0 | Status: SHIPPED | OUTPATIENT
Start: 2023-12-17 | End: 2024-01-16

## 2023-12-17 RX ORDER — DEXTROAMPHETAMINE SACCHARATE, AMPHETAMINE ASPARTATE MONOHYDRATE, DEXTROAMPHETAMINE SULFATE AND AMPHETAMINE SULFATE 6.25; 6.25; 6.25; 6.25 MG/1; MG/1; MG/1; MG/1
25 CAPSULE, EXTENDED RELEASE ORAL EVERY MORNING
Qty: 30 CAPSULE | Refills: 0 | Status: SHIPPED | OUTPATIENT
Start: 2023-12-17 | End: 2024-01-16

## 2023-12-19 PROBLEM — R79.89 LOW TESTOSTERONE IN MALE: Status: ACTIVE | Noted: 2023-12-19

## 2024-01-15 DIAGNOSIS — F90.9 ATTENTION DEFICIT HYPERACTIVITY DISORDER (ADHD), UNSPECIFIED ADHD TYPE: ICD-10-CM

## 2024-01-15 DIAGNOSIS — F90.2 ATTENTION DEFICIT HYPERACTIVITY DISORDER (ADHD), COMBINED TYPE: ICD-10-CM

## 2024-01-15 RX ORDER — DEXTROAMPHETAMINE SACCHARATE, AMPHETAMINE ASPARTATE MONOHYDRATE, DEXTROAMPHETAMINE SULFATE AND AMPHETAMINE SULFATE 6.25; 6.25; 6.25; 6.25 MG/1; MG/1; MG/1; MG/1
25 CAPSULE, EXTENDED RELEASE ORAL EVERY MORNING
Qty: 30 CAPSULE | Refills: 0 | Status: SHIPPED | OUTPATIENT
Start: 2024-01-15 | End: 2024-02-14

## 2024-01-15 RX ORDER — DEXTROAMPHETAMINE SACCHARATE, AMPHETAMINE ASPARTATE, DEXTROAMPHETAMINE SULFATE AND AMPHETAMINE SULFATE 5; 5; 5; 5 MG/1; MG/1; MG/1; MG/1
20 TABLET ORAL DAILY
Qty: 30 TABLET | Refills: 0 | Status: SHIPPED | OUTPATIENT
Start: 2024-01-15 | End: 2024-02-14

## 2024-01-15 NOTE — TELEPHONE ENCOUNTER
Medication:   Requested Prescriptions     Pending Prescriptions Disp Refills    amphetamine-dextroamphetamine (ADDERALL XR) 25 MG extended release capsule 30 capsule 0     Sig: Take 1 capsule by mouth every morning for 30 days.    amphetamine-dextroamphetamine (ADDERALL, 20MG,) 20 MG tablet 30 tablet 0     Sig: Take 1 tablet by mouth daily for 30 days. Max Daily Amount: 20 mg     Last Filled:  12.17.23 12.17.23    Last appt: 12/19/2023   Next appt: Visit date not found    Return in about 3 months (around 3/19/2024  Last OARRS:       12/19/2023    12:50 PM   RX Monitoring   Periodic Controlled Substance Monitoring No signs of potential drug abuse or diversion identified.

## 2024-02-07 NOTE — TELEPHONE ENCOUNTER
Pt called and states he needs Dr. Otis Salgado needs to call pharmacy bc it states on adderral 20mg says to not fill until 11/21/23 which shouldn't be on thee. . Also take adderral 25mg XR which he needs a refill on.  Pls call pt with any questions Waiting to get some context

## 2024-02-11 DIAGNOSIS — F90.2 ATTENTION DEFICIT HYPERACTIVITY DISORDER (ADHD), COMBINED TYPE: ICD-10-CM

## 2024-02-11 DIAGNOSIS — F90.9 ATTENTION DEFICIT HYPERACTIVITY DISORDER (ADHD), UNSPECIFIED ADHD TYPE: ICD-10-CM

## 2024-02-12 NOTE — TELEPHONE ENCOUNTER
Medication:   Requested Prescriptions     Pending Prescriptions Disp Refills    amphetamine-dextroamphetamine (ADDERALL XR) 25 MG extended release capsule 30 capsule 0     Sig: Take 1 capsule by mouth every morning for 30 days.    amphetamine-dextroamphetamine (ADDERALL, 20MG,) 20 MG tablet 30 tablet 0     Sig: Take 1 tablet by mouth daily for 30 days. Max Daily Amount: 20 mg     Last Filled:  1.15.24 1.15.24    Last appt: 12/19/2023   Next appt: Visit date not found    Last OARRS:       1/15/2024    12:25 PM   RX Monitoring   Periodic Controlled Substance Monitoring No signs of potential drug abuse or diversion identified.

## 2024-02-13 RX ORDER — DEXTROAMPHETAMINE SACCHARATE, AMPHETAMINE ASPARTATE, DEXTROAMPHETAMINE SULFATE AND AMPHETAMINE SULFATE 5; 5; 5; 5 MG/1; MG/1; MG/1; MG/1
20 TABLET ORAL DAILY
Qty: 30 TABLET | Refills: 0 | Status: SHIPPED | OUTPATIENT
Start: 2024-02-13 | End: 2024-03-14

## 2024-02-13 RX ORDER — DEXTROAMPHETAMINE SACCHARATE, AMPHETAMINE ASPARTATE MONOHYDRATE, DEXTROAMPHETAMINE SULFATE AND AMPHETAMINE SULFATE 6.25; 6.25; 6.25; 6.25 MG/1; MG/1; MG/1; MG/1
25 CAPSULE, EXTENDED RELEASE ORAL EVERY MORNING
Qty: 30 CAPSULE | Refills: 0 | Status: SHIPPED | OUTPATIENT
Start: 2024-02-13 | End: 2024-03-14

## 2024-02-23 ENCOUNTER — TELEPHONE (OUTPATIENT)
Dept: PRIMARY CARE CLINIC | Age: 49
End: 2024-02-23

## 2024-02-23 NOTE — TELEPHONE ENCOUNTER
Pt called stating that the life insurance company he is using, requested medical records. He states the life insurance company reached out to him stating [employer] said he has no medical records. The request was faxed 1/30/24.

## 2024-03-07 ENCOUNTER — OFFICE VISIT (OUTPATIENT)
Dept: PRIMARY CARE CLINIC | Age: 49
End: 2024-03-07
Payer: OTHER GOVERNMENT

## 2024-03-07 VITALS
HEART RATE: 92 BPM | SYSTOLIC BLOOD PRESSURE: 142 MMHG | OXYGEN SATURATION: 98 % | RESPIRATION RATE: 16 BRPM | DIASTOLIC BLOOD PRESSURE: 98 MMHG | HEIGHT: 68 IN | TEMPERATURE: 97.1 F | BODY MASS INDEX: 26.07 KG/M2 | WEIGHT: 172 LBS

## 2024-03-07 DIAGNOSIS — K21.9 GASTROESOPHAGEAL REFLUX DISEASE, UNSPECIFIED WHETHER ESOPHAGITIS PRESENT: ICD-10-CM

## 2024-03-07 DIAGNOSIS — J30.1 SEASONAL ALLERGIC RHINITIS DUE TO POLLEN: ICD-10-CM

## 2024-03-07 DIAGNOSIS — H65.03 NON-RECURRENT ACUTE SEROUS OTITIS MEDIA OF BOTH EARS: Primary | ICD-10-CM

## 2024-03-07 PROCEDURE — 3080F DIAST BP >= 90 MM HG: CPT | Performed by: STUDENT IN AN ORGANIZED HEALTH CARE EDUCATION/TRAINING PROGRAM

## 2024-03-07 PROCEDURE — 99214 OFFICE O/P EST MOD 30 MIN: CPT | Performed by: STUDENT IN AN ORGANIZED HEALTH CARE EDUCATION/TRAINING PROGRAM

## 2024-03-07 PROCEDURE — 3077F SYST BP >= 140 MM HG: CPT | Performed by: STUDENT IN AN ORGANIZED HEALTH CARE EDUCATION/TRAINING PROGRAM

## 2024-03-07 RX ORDER — PREDNISONE 10 MG/1
TABLET ORAL
Qty: 18 TABLET | Refills: 0 | Status: SHIPPED | OUTPATIENT
Start: 2024-03-07 | End: 2024-03-16

## 2024-03-07 RX ORDER — OMEPRAZOLE 40 MG/1
CAPSULE, DELAYED RELEASE ORAL
COMMUNITY
Start: 2024-02-06

## 2024-03-07 SDOH — ECONOMIC STABILITY: TRANSPORTATION INSECURITY
IN THE PAST 12 MONTHS, HAS LACK OF TRANSPORTATION KEPT YOU FROM MEETINGS, WORK, OR FROM GETTING THINGS NEEDED FOR DAILY LIVING?: NO

## 2024-03-07 SDOH — ECONOMIC STABILITY: FOOD INSECURITY: WITHIN THE PAST 12 MONTHS, THE FOOD YOU BOUGHT JUST DIDN'T LAST AND YOU DIDN'T HAVE MONEY TO GET MORE.: NEVER TRUE

## 2024-03-07 SDOH — ECONOMIC STABILITY: INCOME INSECURITY: HOW HARD IS IT FOR YOU TO PAY FOR THE VERY BASICS LIKE FOOD, HOUSING, MEDICAL CARE, AND HEATING?: NOT HARD AT ALL

## 2024-03-07 SDOH — ECONOMIC STABILITY: FOOD INSECURITY: WITHIN THE PAST 12 MONTHS, YOU WORRIED THAT YOUR FOOD WOULD RUN OUT BEFORE YOU GOT MONEY TO BUY MORE.: NEVER TRUE

## 2024-03-07 ASSESSMENT — PATIENT HEALTH QUESTIONNAIRE - PHQ9
1. LITTLE INTEREST OR PLEASURE IN DOING THINGS: NOT AT ALL
2. FEELING DOWN, DEPRESSED OR HOPELESS: 0
SUM OF ALL RESPONSES TO PHQ QUESTIONS 1-9: 0
SUM OF ALL RESPONSES TO PHQ9 QUESTIONS 1 & 2: 0
SUM OF ALL RESPONSES TO PHQ9 QUESTIONS 1 & 2: 0
SUM OF ALL RESPONSES TO PHQ QUESTIONS 1-9: 0
1. LITTLE INTEREST OR PLEASURE IN DOING THINGS: 0
2. FEELING DOWN, DEPRESSED OR HOPELESS: NOT AT ALL
SUM OF ALL RESPONSES TO PHQ QUESTIONS 1-9: 0
SUM OF ALL RESPONSES TO PHQ QUESTIONS 1-9: 0

## 2024-03-07 NOTE — PROGRESS NOTES
pollen  Acute on chronic, start daily antihistamine OTC, flonase 2 puffs per nares daily.    3. Gastroesophageal reflux disease, unspecified whether esophagitis present  Chronic, worsening, suspect due to histamine response. Continue prilosec 40 mg daily.        Patient given educational materials - see patient instructions.  Discussed use, benefit, and side effects of prescribed medications.  All patient questions answered.  They voiced understanding. Patient agreed with treatment plan. Follow up as directed.        No follow-ups on file.      Future Appointments   Date Time Provider Department Center   3/11/2024  9:00 AM Ashtyn Ramsey MD [facility]         Electronically signed by Celine Ramon DO on 3/7/2024 at 3:36 PM

## 2024-03-08 ENCOUNTER — TELEPHONE (OUTPATIENT)
Dept: PRIMARY CARE CLINIC | Age: 49
End: 2024-03-08

## 2024-03-08 DIAGNOSIS — F90.9 ATTENTION DEFICIT HYPERACTIVITY DISORDER (ADHD), UNSPECIFIED ADHD TYPE: ICD-10-CM

## 2024-03-08 DIAGNOSIS — F90.2 ATTENTION DEFICIT HYPERACTIVITY DISORDER (ADHD), COMBINED TYPE: ICD-10-CM

## 2024-03-08 NOTE — TELEPHONE ENCOUNTER
Medication:   Requested Prescriptions     Pending Prescriptions Disp Refills    amphetamine-dextroamphetamine (ADDERALL XR) 25 MG extended release capsule 30 capsule 0     Sig: Take 1 capsule by mouth every morning for 30 days.    amphetamine-dextroamphetamine (ADDERALL, 20MG,) 20 MG tablet 30 tablet 0     Sig: Take 1 tablet by mouth daily for 30 days. Max Daily Amount: 20 mg     Last Filled:  2.13.24 2.13.24    Last appt: 3/7/2024   Next appt: 3/8/2024    Last OARRS:       1/15/2024    12:25 PM   RX Monitoring   Periodic Controlled Substance Monitoring No signs of potential drug abuse or diversion identified.

## 2024-03-08 NOTE — TELEPHONE ENCOUNTER
Pt called in for meds refill. Please contact when completed.  amphetamine-dextroamphetamine (ADDERALL, 20MG,) 20 MG tablet [[id number]]   amphetamine-dextroamphetamine (ADDERALL XR) 25 MG extended release capsule [[id number]]   [id number] - [place], [place] - [address] [address] [facility] [address] - P [phone] - F [phone]  [address] [address]  Phone: [phone]  Fax: [phone]

## 2024-03-08 NOTE — TELEPHONE ENCOUNTER
Duplicate request, the medication refill has been sent to a covering provider in previous refill request.

## 2024-03-11 ENCOUNTER — TELEPHONE (OUTPATIENT)
Dept: PRIMARY CARE CLINIC | Age: 49
End: 2024-03-11

## 2024-03-11 ENCOUNTER — OFFICE VISIT (OUTPATIENT)
Dept: ENDOCRINOLOGY | Age: 49
End: 2024-03-11
Payer: OTHER GOVERNMENT

## 2024-03-11 VITALS
BODY MASS INDEX: 25.91 KG/M2 | HEART RATE: 84 BPM | SYSTOLIC BLOOD PRESSURE: 130 MMHG | WEIGHT: 171 LBS | HEIGHT: 68 IN | OXYGEN SATURATION: 98 % | DIASTOLIC BLOOD PRESSURE: 92 MMHG

## 2024-03-11 DIAGNOSIS — E29.1 HYPOGONADISM IN MALE: Primary | ICD-10-CM

## 2024-03-11 DIAGNOSIS — N52.9 ERECTILE DYSFUNCTION, UNSPECIFIED ERECTILE DYSFUNCTION TYPE: ICD-10-CM

## 2024-03-11 DIAGNOSIS — E29.1 HYPOGONADISM IN MALE: ICD-10-CM

## 2024-03-11 DIAGNOSIS — R53.83 OTHER FATIGUE: ICD-10-CM

## 2024-03-11 DIAGNOSIS — G47.9 SLEEP DISORDER: ICD-10-CM

## 2024-03-11 PROCEDURE — 3077F SYST BP >= 140 MM HG: CPT | Performed by: INTERNAL MEDICINE

## 2024-03-11 PROCEDURE — 99204 OFFICE O/P NEW MOD 45 MIN: CPT | Performed by: INTERNAL MEDICINE

## 2024-03-11 PROCEDURE — 3080F DIAST BP >= 90 MM HG: CPT | Performed by: INTERNAL MEDICINE

## 2024-03-11 RX ORDER — TESTOSTERONE CYPIONATE 200 MG/ML
200 INJECTION, SOLUTION INTRAMUSCULAR WEEKLY
Qty: 4 ML | Refills: 1 | Status: SHIPPED | OUTPATIENT
Start: 2024-03-11 | End: 2024-03-11 | Stop reason: SDUPTHER

## 2024-03-11 RX ORDER — SYRINGE W-NEEDLE,DISPOSAB,3 ML 25GX5/8"
SYRINGE, EMPTY DISPOSABLE MISCELLANEOUS
Qty: 12 EACH | Refills: 3 | Status: SHIPPED | OUTPATIENT
Start: 2024-03-11

## 2024-03-11 RX ORDER — DEXTROAMPHETAMINE SACCHARATE, AMPHETAMINE ASPARTATE MONOHYDRATE, DEXTROAMPHETAMINE SULFATE AND AMPHETAMINE SULFATE 6.25; 6.25; 6.25; 6.25 MG/1; MG/1; MG/1; MG/1
25 CAPSULE, EXTENDED RELEASE ORAL EVERY MORNING
Qty: 30 CAPSULE | Refills: 0 | Status: SHIPPED | OUTPATIENT
Start: 2024-03-11 | End: 2024-04-10

## 2024-03-11 RX ORDER — DEXTROAMPHETAMINE SACCHARATE, AMPHETAMINE ASPARTATE, DEXTROAMPHETAMINE SULFATE AND AMPHETAMINE SULFATE 5; 5; 5; 5 MG/1; MG/1; MG/1; MG/1
20 TABLET ORAL DAILY
Qty: 30 TABLET | Refills: 0 | Status: SHIPPED | OUTPATIENT
Start: 2024-03-11 | End: 2024-04-10

## 2024-03-11 RX ORDER — TESTOSTERONE CYPIONATE 200 MG/ML
INJECTION, SOLUTION INTRAMUSCULAR
Qty: 4 ML | Refills: 1 | Status: SHIPPED | OUTPATIENT
Start: 2024-03-11 | End: 2024-05-13

## 2024-03-11 NOTE — TELEPHONE ENCOUNTER
Called pt to let him know that I spoke to [employer] and the request is in process, they are trying to expedite it as fast as they can.

## 2024-03-11 NOTE — PROGRESS NOTES
Patient ID:   Chidi Dobbs is a 48 y.o. male    Chief Complaint:   Chidi Dobbs is seen for initial evaluation of hypogonadism     Subjective:   Chidi Dobbs in TRT since 2021.   Previously under treatment with Serenity schwartz.     Currently taking Testosterone cypionate 0.8 ml SC twice a week   Off for a week     Libido is 1/10   performance is okay   No energy levels   Has morning erections when the needs to use restroom   Has poor sleep, 10-11 sleep   Shaves every day   Walks 3.5 - 4 miles a day   No depression or anxiety     Denies nipple discharge or gynecomastia     No head traumas   Fathered three kids. S/p vasectomy     Does not smoke   No alcohol   Denies use of drugs, opioids, steroids       The following portions of the patient's history were reviewed and updated as appropriate:        Family History   Problem Relation Age of Onset    Heart Attack Mother     High Blood Pressure Mother     High Cholesterol Mother     Breast Cancer Mother     Alcohol Abuse Mother     Heart Attack Father     Other Father         COVID    High Blood Pressure Father     High Cholesterol Father     Cancer Maternal Grandmother         breast     Heart Attack Maternal Grandfather     Cancer Paternal Grandmother         \"female region\"    Heart Attack Paternal Grandfather     Cancer Paternal Aunt     Heart Attack Paternal Uncle          Social History     Socioeconomic History    Marital status: Single     Spouse name: Not on file    Number of children: Not on file    Years of education: Not on file    Highest education level: Master's degree (e.g., MA, MS, Alan, MEd, MSW, SAMSON)   Occupational History    Not on file   Tobacco Use    Smoking status: Never     Passive exposure: Never    Smokeless tobacco: Never   Vaping Use    Vaping Use: Never used   Substance and Sexual Activity    Alcohol use: Not Currently    Drug use: Never    Sexual activity: Yes   Other Topics Concern    Not on file   Social History Narrative    Not on

## 2024-03-12 ENCOUNTER — TELEPHONE (OUTPATIENT)
Dept: ENDOCRINOLOGY | Age: 49
End: 2024-03-12

## 2024-03-12 NOTE — TELEPHONE ENCOUNTER
Submitted PA for Testosterone Cyp  Via CMM Key: [id number]    STATUS: Approved today  CaseId:[id number];Status:Approved;Review Type:Prior Auth;Coverage Start Date:02/11/2024;Coverage End Date:03/12/2025

## 2024-03-12 NOTE — TELEPHONE ENCOUNTER
Pt calling and states he needs Prior Auth for his Testosterone. He has  for his insurance and the phone # he gave # [phone] to call

## 2024-03-13 NOTE — TELEPHONE ENCOUNTER
Called Mr. Dobbs and explained PA for approval for the Testosterone.  He stated he has spoke with the pharmacy and they were asking him question about the RX.     I called [facility] to find what is going with the RX.  They have straightened out the issue with the RX  Pharmacist stated they will have the Testosterone in stock next week.  Mr. Dobbs  received  RX 2/2024  from compounding pharmacy which will last him until next week.

## 2024-04-11 DIAGNOSIS — F90.2 ATTENTION DEFICIT HYPERACTIVITY DISORDER (ADHD), COMBINED TYPE: ICD-10-CM

## 2024-04-11 DIAGNOSIS — F90.9 ATTENTION DEFICIT HYPERACTIVITY DISORDER (ADHD), UNSPECIFIED ADHD TYPE: ICD-10-CM

## 2024-04-11 RX ORDER — DEXTROAMPHETAMINE SACCHARATE, AMPHETAMINE ASPARTATE MONOHYDRATE, DEXTROAMPHETAMINE SULFATE AND AMPHETAMINE SULFATE 6.25; 6.25; 6.25; 6.25 MG/1; MG/1; MG/1; MG/1
25 CAPSULE, EXTENDED RELEASE ORAL EVERY MORNING
Qty: 30 CAPSULE | Refills: 0 | Status: SHIPPED | OUTPATIENT
Start: 2024-04-11 | End: 2024-05-11

## 2024-04-11 RX ORDER — DEXTROAMPHETAMINE SACCHARATE, AMPHETAMINE ASPARTATE, DEXTROAMPHETAMINE SULFATE AND AMPHETAMINE SULFATE 5; 5; 5; 5 MG/1; MG/1; MG/1; MG/1
20 TABLET ORAL DAILY
Qty: 30 TABLET | Refills: 0 | Status: SHIPPED | OUTPATIENT
Start: 2024-04-11 | End: 2024-05-11

## 2024-04-11 NOTE — TELEPHONE ENCOUNTER
Medication:   Requested Prescriptions     Pending Prescriptions Disp Refills    amphetamine-dextroamphetamine (ADDERALL XR) 25 MG extended release capsule 30 capsule 0     Sig: Take 1 capsule by mouth every morning for 30 days.    amphetamine-dextroamphetamine (ADDERALL, 20MG,) 20 MG tablet 30 tablet 0     Sig: Take 1 tablet by mouth daily for 30 days. Max Daily Amount: 20 mg     Last Filled:  amphetamine 25mg - 3/11/2024  Amphetamine 20mg - 3/11/2024    Last appt: 3/7/2024   Next appt: Visit date not found    Last OARRS:       1/15/2024    12:25 PM   RX Monitoring   Periodic Controlled Substance Monitoring No signs of potential drug abuse or diversion identified.

## 2024-04-16 DIAGNOSIS — N52.9 ERECTILE DYSFUNCTION, UNSPECIFIED ERECTILE DYSFUNCTION TYPE: ICD-10-CM

## 2024-04-16 DIAGNOSIS — R53.83 OTHER FATIGUE: ICD-10-CM

## 2024-04-16 DIAGNOSIS — E29.1 HYPOGONADISM IN MALE: ICD-10-CM

## 2024-04-16 LAB
ALBUMIN SERPL-MCNC: 5.1 G/DL (ref 3.4–5)
ALBUMIN/GLOB SERPL: 2.3 {RATIO} (ref 1.1–2.2)
ALP SERPL-CCNC: 42 U/L (ref 40–129)
ALT SERPL-CCNC: 24 U/L (ref 10–40)
AMPHETAMINES UR QL SCN>1000 NG/ML: POSITIVE
ANION GAP SERPL CALCULATED.3IONS-SCNC: 14 MMOL/L (ref 3–16)
AST SERPL-CCNC: 19 U/L (ref 15–37)
BARBITURATES UR QL SCN>200 NG/ML: ABNORMAL
BASOPHILS # BLD: 0 K/UL (ref 0–0.2)
BASOPHILS NFR BLD: 0.6 %
BENZODIAZ UR QL SCN>200 NG/ML: ABNORMAL
BILIRUB SERPL-MCNC: 0.8 MG/DL (ref 0–1)
BUN SERPL-MCNC: 14 MG/DL (ref 7–20)
BUPRENORPHINE+NOR UR QL SCN: ABNORMAL
CALCIUM SERPL-MCNC: 9.5 MG/DL (ref 8.3–10.6)
CANNABINOIDS UR QL SCN>50 NG/ML: ABNORMAL
CHLORIDE SERPL-SCNC: 100 MMOL/L (ref 99–110)
CO2 SERPL-SCNC: 27 MMOL/L (ref 21–32)
COCAINE UR QL SCN: ABNORMAL
CREAT SERPL-MCNC: 1.2 MG/DL (ref 0.9–1.3)
DEPRECATED RDW RBC AUTO: 13.8 % (ref 12.4–15.4)
DRUG SCREEN COMMENT UR-IMP: ABNORMAL
EOSINOPHIL # BLD: 0.1 K/UL (ref 0–0.6)
EOSINOPHIL NFR BLD: 2.2 %
FENTANYL SCREEN, URINE: ABNORMAL
FSH SERPL-ACNC: <0.1 MIU/ML
GFR SERPLBLD CREATININE-BSD FMLA CKD-EPI: 74 ML/MIN/{1.73_M2}
GLUCOSE SERPL-MCNC: 82 MG/DL (ref 70–99)
HCT VFR BLD AUTO: 49.7 % (ref 40.5–52.5)
HGB BLD-MCNC: 17.3 G/DL (ref 13.5–17.5)
LH SERPL-ACNC: <0.1 MIU/ML
LYMPHOCYTES # BLD: 1.2 K/UL (ref 1–5.1)
LYMPHOCYTES NFR BLD: 26.9 %
MCH RBC QN AUTO: 29.1 PG (ref 26–34)
MCHC RBC AUTO-ENTMCNC: 34.7 G/DL (ref 31–36)
MCV RBC AUTO: 83.7 FL (ref 80–100)
METHADONE UR QL SCN>300 NG/ML: ABNORMAL
MONOCYTES # BLD: 0.4 K/UL (ref 0–1.3)
MONOCYTES NFR BLD: 9.7 %
NEUTROPHILS # BLD: 2.7 K/UL (ref 1.7–7.7)
NEUTROPHILS NFR BLD: 60.6 %
OPIATES UR QL SCN>300 NG/ML: ABNORMAL
OXYCODONE UR QL SCN: ABNORMAL
PCP UR QL SCN>25 NG/ML: ABNORMAL
PH UR STRIP: 7 [PH]
PLATELET # BLD AUTO: 232 K/UL (ref 135–450)
PMV BLD AUTO: 9 FL (ref 5–10.5)
POTASSIUM SERPL-SCNC: 4.2 MMOL/L (ref 3.5–5.1)
PROLACTIN SERPL IA-MCNC: 13.7 NG/ML
PROT SERPL-MCNC: 7.3 G/DL (ref 6.4–8.2)
PSA SERPL DL<=0.01 NG/ML-MCNC: 1.76 NG/ML (ref 0–4)
PSA SERPL DL<=0.01 NG/ML-MCNC: 1.76 NG/ML (ref 0–4)
RBC # BLD AUTO: 5.94 M/UL (ref 4.2–5.9)
SODIUM SERPL-SCNC: 141 MMOL/L (ref 136–145)
T3FREE SERPL-MCNC: 4.2 PG/ML (ref 2.3–4.2)
T4 FREE SERPL-MCNC: 1.1 NG/DL (ref 0.9–1.8)
TSH SERPL DL<=0.005 MIU/L-ACNC: 2.94 UIU/ML (ref 0.27–4.2)
WBC # BLD AUTO: 4.5 K/UL (ref 4–11)

## 2024-04-18 LAB
IGF-I SERPL-MCNC: 235 NG/ML (ref 68–225)
IGF-I Z-SCORE SERPL: 2
MONOMERIC PROLACTION: 9.4 NG/ML (ref 2.1–13.3)
PROLACTIN SERPL IA-MCNC: 9.3 NG/ML (ref 2.1–17.7)
PROLACTIN.MONOMERIC/TOTAL MFR SERPL: 101.1 %
SHBG SERPL-SCNC: 35 NMOL/L (ref 17–56)
TESTOST FREE MFR SERPL: ABNORMAL % (ref 1.6–2.9)
TESTOST FREE SERPL-MCNC: ABNORMAL PG/ML (ref 47–244)
TESTOST SERPL-MCNC: >1500 NG/DL (ref 300–890)
TESTOSTERONE.FREE+WB SERPL-MCNC: ABNORMAL NG/DL (ref 131–682)

## 2024-04-19 LAB
ESTRADIOL SERPL HS-MCNC: 64.8 PG/ML (ref 10–42)
ESTROGEN SERPL CALC-MCNC: 121 PG/ML (ref 19–69)
ESTRONE SERPL-MCNC: 56.2 PG/ML (ref 9–36)

## 2024-05-12 DIAGNOSIS — F90.2 ATTENTION DEFICIT HYPERACTIVITY DISORDER (ADHD), COMBINED TYPE: ICD-10-CM

## 2024-05-12 DIAGNOSIS — F90.9 ATTENTION DEFICIT HYPERACTIVITY DISORDER (ADHD), UNSPECIFIED ADHD TYPE: ICD-10-CM

## 2024-05-13 ENCOUNTER — TELEMEDICINE (OUTPATIENT)
Dept: PRIMARY CARE CLINIC | Age: 49
End: 2024-05-13
Payer: OTHER GOVERNMENT

## 2024-05-13 DIAGNOSIS — M25.551 RIGHT HIP PAIN: ICD-10-CM

## 2024-05-13 DIAGNOSIS — M79.642 BILATERAL HAND PAIN: Primary | ICD-10-CM

## 2024-05-13 DIAGNOSIS — M79.641 BILATERAL HAND PAIN: Primary | ICD-10-CM

## 2024-05-13 DIAGNOSIS — M25.561 RIGHT KNEE PAIN, UNSPECIFIED CHRONICITY: ICD-10-CM

## 2024-05-13 PROCEDURE — 99213 OFFICE O/P EST LOW 20 MIN: CPT | Performed by: FAMILY MEDICINE

## 2024-05-13 RX ORDER — DEXTROAMPHETAMINE SACCHARATE, AMPHETAMINE ASPARTATE MONOHYDRATE, DEXTROAMPHETAMINE SULFATE AND AMPHETAMINE SULFATE 6.25; 6.25; 6.25; 6.25 MG/1; MG/1; MG/1; MG/1
25 CAPSULE, EXTENDED RELEASE ORAL EVERY MORNING
Qty: 30 CAPSULE | Refills: 0 | Status: SHIPPED | OUTPATIENT
Start: 2024-05-13 | End: 2024-06-12

## 2024-05-13 RX ORDER — CYCLOBENZAPRINE HCL 5 MG
5 TABLET ORAL 3 TIMES DAILY PRN
Qty: 30 TABLET | Refills: 1 | Status: SHIPPED | OUTPATIENT
Start: 2024-05-13

## 2024-05-13 RX ORDER — DEXTROAMPHETAMINE SACCHARATE, AMPHETAMINE ASPARTATE, DEXTROAMPHETAMINE SULFATE AND AMPHETAMINE SULFATE 5; 5; 5; 5 MG/1; MG/1; MG/1; MG/1
20 TABLET ORAL DAILY
Qty: 30 TABLET | Refills: 0 | Status: SHIPPED | OUTPATIENT
Start: 2024-05-13 | End: 2024-06-12

## 2024-05-13 RX ORDER — MELOXICAM 7.5 MG/1
7.5 TABLET ORAL 2 TIMES DAILY PRN
Qty: 60 TABLET | Refills: 1 | Status: SHIPPED | OUTPATIENT
Start: 2024-05-13

## 2024-05-13 ASSESSMENT — ENCOUNTER SYMPTOMS
DIARRHEA: 0
EYE DISCHARGE: 0
TROUBLE SWALLOWING: 0
COUGH: 0
SORE THROAT: 0
EYE ITCHING: 0
NAUSEA: 0
SHORTNESS OF BREATH: 0
ABDOMINAL PAIN: 0

## 2024-05-13 NOTE — TELEPHONE ENCOUNTER
Medication:   Requested Prescriptions     Pending Prescriptions Disp Refills    amphetamine-dextroamphetamine (ADDERALL XR) 25 MG extended release capsule 30 capsule 0     Sig: Take 1 capsule by mouth every morning for 30 days.    amphetamine-dextroamphetamine (ADDERALL, 20MG,) 20 MG tablet 30 tablet 0     Sig: Take 1 tablet by mouth daily for 30 days. Max Daily Amount: 20 mg     Last Filled:  4.11.24    Last appt: 3/7/2024   Next appt: Visit date not found    Last OARRS:       4/11/2024    10:41 AM   RX Monitoring   Periodic Controlled Substance Monitoring No signs of potential drug abuse or diversion identified.

## 2024-05-13 NOTE — PROGRESS NOTES
Chidi Dobbs (:  1975) is a 49 y.o. male,Established patient, here for evaluation of the following chief complaint(s):  No chief complaint on file.      ASSESSMENT/PLAN:  1. Bilateral hand pain  -     RHEUMATOID FACTOR; Future  -     UZIEL Reflex to Antibody Cascade; Future  -     Sedimentation Rate; Future  -     C-Reactive Protein; Future  -     XR HAND LEFT (2 VIEWS); Future  -     XR HAND RIGHT (2 VIEWS); Future  -     diclofenac sodium (VOLTAREN) 1 % GEL; Apply 2 g topically 4 times daily, Topical, 4 TIMES DAILY Starting Mon 2024, Disp-100 g, R-2, Normal  -     CYCLIC CITRUL PEPTIDE ANTIBODY, IGG; Future  -     cyclobenzaprine (FLEXERIL) 5 MG tablet; Take 1 tablet by mouth 3 times daily as needed for Muscle spasms, Disp-30 tablet, R-1Normal  -     meloxicam (MOBIC) 7.5 MG tablet; Take 1 tablet by mouth 2 times daily as needed for Pain, Disp-60 tablet, R-1Normal  2. Right hip pain  -     RHEUMATOID FACTOR; Future  -     UZIEL Reflex to Antibody Cascade; Future  -     Sedimentation Rate; Future  -     C-Reactive Protein; Future  -     XR HIP 2-3 VW W PELVIS RIGHT; Future  -     diclofenac sodium (VOLTAREN) 1 % GEL; Apply 2 g topically 4 times daily, Topical, 4 TIMES DAILY Starting 2024, Disp-100 g, R-2, Normal  -     CYCLIC CITRUL PEPTIDE ANTIBODY, IGG; Future  -     cyclobenzaprine (FLEXERIL) 5 MG tablet; Take 1 tablet by mouth 3 times daily as needed for Muscle spasms, Disp-30 tablet, R-1Normal  -     meloxicam (MOBIC) 7.5 MG tablet; Take 1 tablet by mouth 2 times daily as needed for Pain, Disp-60 tablet, R-1Normal  3. Right knee pain, unspecified chronicity  -     XR KNEE RIGHT (MIN 4 VIEWS); Future  -     diclofenac sodium (VOLTAREN) 1 % GEL; Apply 2 g topically 4 times daily, Topical, 4 TIMES DAILY Starting Mon 2024, Disp-100 g, R-2, Normal  -     CYCLIC CITRUL PEPTIDE ANTIBODY, IGG; Future  -     cyclobenzaprine (FLEXERIL) 5 MG tablet; Take 1 tablet by mouth 3 times daily as needed

## 2024-05-16 DIAGNOSIS — E29.1 HYPOGONADISM IN MALE: ICD-10-CM

## 2024-05-16 DIAGNOSIS — R53.83 OTHER FATIGUE: ICD-10-CM

## 2024-05-16 DIAGNOSIS — N52.9 ERECTILE DYSFUNCTION, UNSPECIFIED ERECTILE DYSFUNCTION TYPE: ICD-10-CM

## 2024-05-16 RX ORDER — SYRINGE W-NEEDLE,DISPOSAB,3 ML 25GX5/8"
SYRINGE, EMPTY DISPOSABLE MISCELLANEOUS
Qty: 12 EACH | Refills: 3 | OUTPATIENT
Start: 2024-05-16

## 2024-05-16 RX ORDER — TESTOSTERONE CYPIONATE 200 MG/ML
INJECTION, SOLUTION INTRAMUSCULAR
Qty: 4 ML | Refills: 1 | OUTPATIENT
Start: 2024-05-16 | End: 2024-07-18

## 2024-05-17 ENCOUNTER — OFFICE VISIT (OUTPATIENT)
Dept: ENDOCRINOLOGY | Age: 49
End: 2024-05-17
Payer: OTHER GOVERNMENT

## 2024-05-17 VITALS
SYSTOLIC BLOOD PRESSURE: 148 MMHG | BODY MASS INDEX: 26.19 KG/M2 | HEART RATE: 82 BPM | OXYGEN SATURATION: 99 % | HEIGHT: 68 IN | WEIGHT: 172.8 LBS | DIASTOLIC BLOOD PRESSURE: 98 MMHG

## 2024-05-17 DIAGNOSIS — Z00.00 WELLNESS EXAMINATION: ICD-10-CM

## 2024-05-17 DIAGNOSIS — E34.8 EXCESS ESTROGEN IN MALE: Primary | ICD-10-CM

## 2024-05-17 DIAGNOSIS — M25.561 RIGHT KNEE PAIN, UNSPECIFIED CHRONICITY: ICD-10-CM

## 2024-05-17 DIAGNOSIS — E29.1 HYPOGONADISM IN MALE: ICD-10-CM

## 2024-05-17 DIAGNOSIS — M79.641 BILATERAL HAND PAIN: ICD-10-CM

## 2024-05-17 DIAGNOSIS — E78.5 HYPERLIPIDEMIA, UNSPECIFIED HYPERLIPIDEMIA TYPE: ICD-10-CM

## 2024-05-17 DIAGNOSIS — R53.83 OTHER FATIGUE: ICD-10-CM

## 2024-05-17 DIAGNOSIS — M25.551 RIGHT HIP PAIN: ICD-10-CM

## 2024-05-17 DIAGNOSIS — M79.642 BILATERAL HAND PAIN: ICD-10-CM

## 2024-05-17 DIAGNOSIS — N52.9 ERECTILE DYSFUNCTION, UNSPECIFIED ERECTILE DYSFUNCTION TYPE: ICD-10-CM

## 2024-05-17 LAB
ALBUMIN SERPL-MCNC: 4.6 G/DL (ref 3.4–5)
ALBUMIN/GLOB SERPL: 2 {RATIO} (ref 1.1–2.2)
ALP SERPL-CCNC: 39 U/L (ref 40–129)
ALT SERPL-CCNC: 31 U/L (ref 10–40)
ANION GAP SERPL CALCULATED.3IONS-SCNC: 7 MMOL/L (ref 3–16)
AST SERPL-CCNC: 23 U/L (ref 15–37)
BASOPHILS # BLD: 0 K/UL (ref 0–0.2)
BASOPHILS NFR BLD: 0.3 %
BILIRUB SERPL-MCNC: 0.5 MG/DL (ref 0–1)
BUN SERPL-MCNC: 21 MG/DL (ref 7–20)
CALCIUM SERPL-MCNC: 9.3 MG/DL (ref 8.3–10.6)
CHLORIDE SERPL-SCNC: 103 MMOL/L (ref 99–110)
CHOLEST SERPL-MCNC: 218 MG/DL (ref 0–199)
CO2 SERPL-SCNC: 32 MMOL/L (ref 21–32)
CREAT SERPL-MCNC: 1.2 MG/DL (ref 0.9–1.3)
CRP SERPL-MCNC: <3 MG/L (ref 0–5.1)
DEPRECATED RDW RBC AUTO: 14.5 % (ref 12.4–15.4)
EOSINOPHIL # BLD: 0.1 K/UL (ref 0–0.6)
EOSINOPHIL NFR BLD: 3.1 %
ERYTHROCYTE [SEDIMENTATION RATE] IN BLOOD BY WESTERGREN METHOD: 6 MM/HR (ref 0–15)
GFR SERPLBLD CREATININE-BSD FMLA CKD-EPI: 74 ML/MIN/{1.73_M2}
GLUCOSE SERPL-MCNC: 87 MG/DL (ref 70–99)
HCT VFR BLD AUTO: 46.3 % (ref 40.5–52.5)
HDLC SERPL-MCNC: 53 MG/DL (ref 40–60)
HGB BLD-MCNC: 16.1 G/DL (ref 13.5–17.5)
LDLC SERPL CALC-MCNC: 149 MG/DL
LYMPHOCYTES # BLD: 1.1 K/UL (ref 1–5.1)
LYMPHOCYTES NFR BLD: 31.1 %
MCH RBC QN AUTO: 29 PG (ref 26–34)
MCHC RBC AUTO-ENTMCNC: 34.8 G/DL (ref 31–36)
MCV RBC AUTO: 83.2 FL (ref 80–100)
MONOCYTES # BLD: 0.5 K/UL (ref 0–1.3)
MONOCYTES NFR BLD: 12.9 %
NEUTROPHILS # BLD: 1.9 K/UL (ref 1.7–7.7)
NEUTROPHILS NFR BLD: 52.6 %
PLATELET # BLD AUTO: 246 K/UL (ref 135–450)
PMV BLD AUTO: 8.9 FL (ref 5–10.5)
POTASSIUM SERPL-SCNC: 4.4 MMOL/L (ref 3.5–5.1)
PROT SERPL-MCNC: 6.9 G/DL (ref 6.4–8.2)
RBC # BLD AUTO: 5.57 M/UL (ref 4.2–5.9)
RHEUMATOID FACT SER IA-ACNC: <10 IU/ML
SODIUM SERPL-SCNC: 142 MMOL/L (ref 136–145)
TRIGL SERPL-MCNC: 78 MG/DL (ref 0–150)
VLDLC SERPL CALC-MCNC: 16 MG/DL
WBC # BLD AUTO: 3.7 K/UL (ref 4–11)

## 2024-05-17 PROCEDURE — 3077F SYST BP >= 140 MM HG: CPT | Performed by: INTERNAL MEDICINE

## 2024-05-17 PROCEDURE — 99214 OFFICE O/P EST MOD 30 MIN: CPT | Performed by: INTERNAL MEDICINE

## 2024-05-17 PROCEDURE — 3080F DIAST BP >= 90 MM HG: CPT | Performed by: INTERNAL MEDICINE

## 2024-05-17 RX ORDER — TESTOSTERONE CYPIONATE 200 MG/ML
INJECTION, SOLUTION INTRAMUSCULAR
Qty: 3 ML | Refills: 1 | Status: SHIPPED | OUTPATIENT
Start: 2024-05-17 | End: 2024-07-19

## 2024-05-17 RX ORDER — SYRINGE W-NEEDLE,DISPOSAB,3 ML 25GX5/8"
SYRINGE, EMPTY DISPOSABLE MISCELLANEOUS
Qty: 9 EACH | Refills: 3 | Status: SHIPPED | OUTPATIENT
Start: 2024-05-17

## 2024-05-17 NOTE — PROGRESS NOTES
not get better with TRT   ED , taking sildenafil   Suggest seeing Urology     RTC in 2 months with labs. 10-14 days before the next visit , times to be 2-3 days after the shot   Labs to be discussed on next visit       Electronically signed by KEVIN VALERA MD on 5/17/2024 at 10:44 AM

## 2024-05-18 LAB
ANA SER QL IA: NEGATIVE
CCP IGG SERPL-ACNC: 0.5 U/ML (ref 0–2.9)
EST. AVERAGE GLUCOSE BLD GHB EST-MCNC: 93.9 MG/DL
HBA1C MFR BLD: 4.9 %

## 2024-06-12 DIAGNOSIS — F90.9 ATTENTION DEFICIT HYPERACTIVITY DISORDER (ADHD), UNSPECIFIED ADHD TYPE: ICD-10-CM

## 2024-06-12 DIAGNOSIS — F90.2 ATTENTION DEFICIT HYPERACTIVITY DISORDER (ADHD), COMBINED TYPE: ICD-10-CM

## 2024-06-12 RX ORDER — DEXTROAMPHETAMINE SACCHARATE, AMPHETAMINE ASPARTATE, DEXTROAMPHETAMINE SULFATE AND AMPHETAMINE SULFATE 5; 5; 5; 5 MG/1; MG/1; MG/1; MG/1
20 TABLET ORAL DAILY
Qty: 30 TABLET | Refills: 0 | Status: SHIPPED | OUTPATIENT
Start: 2024-06-12 | End: 2024-07-12

## 2024-06-12 RX ORDER — DEXTROAMPHETAMINE SACCHARATE, AMPHETAMINE ASPARTATE MONOHYDRATE, DEXTROAMPHETAMINE SULFATE AND AMPHETAMINE SULFATE 6.25; 6.25; 6.25; 6.25 MG/1; MG/1; MG/1; MG/1
25 CAPSULE, EXTENDED RELEASE ORAL EVERY MORNING
Qty: 30 CAPSULE | Refills: 0 | Status: SHIPPED | OUTPATIENT
Start: 2024-06-12 | End: 2024-07-12

## 2024-06-12 NOTE — TELEPHONE ENCOUNTER
Patient has been seen in the clinic in May 2024 for other concerns but not for the ADHD.  His last ADHD visit was in December.  Please advise patient to schedule an appointment in the next few weeks  for ADHD follow-up  Medication refilled

## 2024-06-12 NOTE — TELEPHONE ENCOUNTER
Medication:   Requested Prescriptions     Pending Prescriptions Disp Refills    amphetamine-dextroamphetamine (ADDERALL XR) 25 MG extended release capsule 30 capsule 0     Sig: Take 1 capsule by mouth every morning for 30 days.    amphetamine-dextroamphetamine (ADDERALL, 20MG,) 20 MG tablet 30 tablet 0     Sig: Take 1 tablet by mouth daily for 30 days. Max Daily Amount: 20 mg     Last Filled:  5.13.24    Last appt: 5/13/2024   Next appt: Visit date not found    Last OARRS:       4/11/2024    10:41 AM   RX Monitoring   Periodic Controlled Substance Monitoring No signs of potential drug abuse or diversion identified.

## 2024-06-13 DIAGNOSIS — M79.642 BILATERAL HAND PAIN: ICD-10-CM

## 2024-06-13 DIAGNOSIS — M79.641 BILATERAL HAND PAIN: ICD-10-CM

## 2024-06-13 DIAGNOSIS — M25.561 RIGHT KNEE PAIN, UNSPECIFIED CHRONICITY: ICD-10-CM

## 2024-06-13 DIAGNOSIS — M25.551 RIGHT HIP PAIN: ICD-10-CM

## 2024-06-13 RX ORDER — MELOXICAM 7.5 MG/1
7.5 TABLET ORAL 2 TIMES DAILY PRN
Qty: 60 TABLET | Refills: 1 | Status: SHIPPED | OUTPATIENT
Start: 2024-06-13

## 2024-06-13 NOTE — TELEPHONE ENCOUNTER
Medication:   Requested Prescriptions     Pending Prescriptions Disp Refills    meloxicam (MOBIC) 7.5 MG tablet [Pharmacy Med Name: MELOXICAM 7.5MG TABLETS] 60 tablet 1     Sig: TAKE 1 TABLET BY MOUTH TWICE DAILY AS NEEDED FOR PAIN     Last filled: 5/13/24  Last appt: 5/13/2024   Next appt: 6/14/2024    Last OARRS:       6/12/2024    10:31 AM   RX Monitoring   Periodic Controlled Substance Monitoring No signs of potential drug abuse or diversion identified.

## 2024-06-14 ENCOUNTER — OFFICE VISIT (OUTPATIENT)
Dept: PRIMARY CARE CLINIC | Age: 49
End: 2024-06-14
Payer: OTHER GOVERNMENT

## 2024-06-14 ENCOUNTER — HOSPITAL ENCOUNTER (OUTPATIENT)
Dept: GENERAL RADIOLOGY | Age: 49
Discharge: HOME | End: 2024-06-14

## 2024-06-14 VITALS
HEART RATE: 78 BPM | WEIGHT: 170.6 LBS | SYSTOLIC BLOOD PRESSURE: 146 MMHG | OXYGEN SATURATION: 99 % | HEIGHT: 68 IN | DIASTOLIC BLOOD PRESSURE: 86 MMHG | BODY MASS INDEX: 25.85 KG/M2 | RESPIRATION RATE: 16 BRPM

## 2024-06-14 DIAGNOSIS — M79.642 BILATERAL HAND PAIN: ICD-10-CM

## 2024-06-14 DIAGNOSIS — F90.9 ATTENTION DEFICIT HYPERACTIVITY DISORDER (ADHD), UNSPECIFIED ADHD TYPE: ICD-10-CM

## 2024-06-14 DIAGNOSIS — E78.5 HYPERLIPIDEMIA, UNSPECIFIED HYPERLIPIDEMIA TYPE: ICD-10-CM

## 2024-06-14 DIAGNOSIS — M25.561 RIGHT KNEE PAIN, UNSPECIFIED CHRONICITY: ICD-10-CM

## 2024-06-14 DIAGNOSIS — I10 HYPERTENSION, UNSPECIFIED TYPE: ICD-10-CM

## 2024-06-14 DIAGNOSIS — R53.83 OTHER FATIGUE: ICD-10-CM

## 2024-06-14 DIAGNOSIS — M79.641 BILATERAL HAND PAIN: ICD-10-CM

## 2024-06-14 DIAGNOSIS — N52.9 ERECTILE DYSFUNCTION, UNSPECIFIED ERECTILE DYSFUNCTION TYPE: ICD-10-CM

## 2024-06-14 DIAGNOSIS — R68.82 DECREASED LIBIDO: ICD-10-CM

## 2024-06-14 DIAGNOSIS — Z00.00 ANNUAL PHYSICAL EXAM: Primary | ICD-10-CM

## 2024-06-14 DIAGNOSIS — M25.551 RIGHT HIP PAIN: ICD-10-CM

## 2024-06-14 DIAGNOSIS — R79.89 DECREASED TESTOSTERONE LEVEL: ICD-10-CM

## 2024-06-14 DIAGNOSIS — E34.8 EXCESS ESTROGEN IN MALE: ICD-10-CM

## 2024-06-14 DIAGNOSIS — E29.1 HYPOGONADISM IN MALE: ICD-10-CM

## 2024-06-14 LAB
ALBUMIN SERPL-MCNC: 5 G/DL (ref 3.4–5)
ALBUMIN/GLOB SERPL: 2.2 {RATIO} (ref 1.1–2.2)
ALP SERPL-CCNC: 39 U/L (ref 40–129)
ALT SERPL-CCNC: 36 U/L (ref 10–40)
ANION GAP SERPL CALCULATED.3IONS-SCNC: 11 MMOL/L (ref 3–16)
AST SERPL-CCNC: 46 U/L (ref 15–37)
BASOPHILS # BLD: 0 K/UL (ref 0–0.2)
BASOPHILS NFR BLD: 0.7 %
BILIRUB SERPL-MCNC: 0.8 MG/DL (ref 0–1)
BUN SERPL-MCNC: 27 MG/DL (ref 7–20)
CALCIUM SERPL-MCNC: 9.9 MG/DL (ref 8.3–10.6)
CHLORIDE SERPL-SCNC: 102 MMOL/L (ref 99–110)
CO2 SERPL-SCNC: 29 MMOL/L (ref 21–32)
CREAT SERPL-MCNC: 1.3 MG/DL (ref 0.9–1.3)
DEPRECATED RDW RBC AUTO: 14.7 % (ref 12.4–15.4)
EOSINOPHIL # BLD: 0.1 K/UL (ref 0–0.6)
EOSINOPHIL NFR BLD: 1.7 %
GFR SERPLBLD CREATININE-BSD FMLA CKD-EPI: 67 ML/MIN/{1.73_M2}
GLUCOSE SERPL-MCNC: 85 MG/DL (ref 70–99)
HCT VFR BLD AUTO: 49.5 % (ref 40.5–52.5)
HGB BLD-MCNC: 16.9 G/DL (ref 13.5–17.5)
LYMPHOCYTES # BLD: 1.1 K/UL (ref 1–5.1)
LYMPHOCYTES NFR BLD: 29.3 %
MCH RBC QN AUTO: 28.9 PG (ref 26–34)
MCHC RBC AUTO-ENTMCNC: 34.1 G/DL (ref 31–36)
MCV RBC AUTO: 84.8 FL (ref 80–100)
MONOCYTES # BLD: 0.4 K/UL (ref 0–1.3)
MONOCYTES NFR BLD: 11.3 %
NEUTROPHILS # BLD: 2.2 K/UL (ref 1.7–7.7)
NEUTROPHILS NFR BLD: 57 %
PLATELET # BLD AUTO: 228 K/UL (ref 135–450)
PMV BLD AUTO: 8.7 FL (ref 5–10.5)
POTASSIUM SERPL-SCNC: 4 MMOL/L (ref 3.5–5.1)
PROT SERPL-MCNC: 7.3 G/DL (ref 6.4–8.2)
RBC # BLD AUTO: 5.83 M/UL (ref 4.2–5.9)
SODIUM SERPL-SCNC: 142 MMOL/L (ref 136–145)
WBC # BLD AUTO: 3.9 K/UL (ref 4–11)

## 2024-06-14 PROCEDURE — 3079F DIAST BP 80-89 MM HG: CPT | Performed by: FAMILY MEDICINE

## 2024-06-14 PROCEDURE — 3077F SYST BP >= 140 MM HG: CPT | Performed by: FAMILY MEDICINE

## 2024-06-14 PROCEDURE — 99396 PREV VISIT EST AGE 40-64: CPT | Performed by: FAMILY MEDICINE

## 2024-06-14 RX ORDER — HYDROCHLOROTHIAZIDE 12.5 MG/1
12.5 CAPSULE, GELATIN COATED ORAL EVERY MORNING
Qty: 90 CAPSULE | OUTPATIENT
Start: 2024-06-14

## 2024-06-14 RX ORDER — CYCLOBENZAPRINE HCL 5 MG
5 TABLET ORAL 3 TIMES DAILY PRN
Qty: 30 TABLET | Refills: 5 | Status: SHIPPED | OUTPATIENT
Start: 2024-06-14

## 2024-06-14 RX ORDER — HYDROCHLOROTHIAZIDE 12.5 MG/1
12.5 CAPSULE, GELATIN COATED ORAL EVERY MORNING
Qty: 30 CAPSULE | Refills: 2 | Status: SHIPPED | OUTPATIENT
Start: 2024-06-14

## 2024-06-14 ASSESSMENT — ENCOUNTER SYMPTOMS
VOMITING: 0
NAUSEA: 0
TROUBLE SWALLOWING: 0
EYE DISCHARGE: 0
COUGH: 0
EYE ITCHING: 0
DIARRHEA: 0
SORE THROAT: 0
ABDOMINAL PAIN: 0
SHORTNESS OF BREATH: 0

## 2024-06-14 NOTE — TELEPHONE ENCOUNTER
Medication:   Requested Prescriptions     Pending Prescriptions Disp Refills    cyclobenzaprine (FLEXERIL) 5 MG tablet 30 tablet 1     Sig: Take 1 tablet by mouth 3 times daily as needed for Muscle spasms     Last Filled:  5.13.24    Last appt: 6/14/2024   Next appt: Visit date not found    Last OARRS:       6/14/2024     8:42 AM   RX Monitoring   Periodic Controlled Substance Monitoring No signs of potential drug abuse or diversion identified.

## 2024-06-14 NOTE — ASSESSMENT & PLAN NOTE
Uncontrolled, start medication as above, patient to get a blood pressure cuff to monitor blood pressure at home

## 2024-06-14 NOTE — PROGRESS NOTES
2024    Chidi Dobbs (:  1975) is a 49 y.o. male, here for a preventive medicine evaluation.    Subjective   Patient Active Problem List   Diagnosis    PTSD (post-traumatic stress disorder)    Anxiety    Attention deficit hyperactivity disorder (ADHD)    Achilles tendinitis    Adjustment disorder with anxiety    Allergic rhinitis    Arthralgia of wrist    Astigmatism    Cervicalgia    Decreased libido    Encounter for postoperative care    Esophageal reflux    Finding of above normal blood pressure    Gastroesophageal reflux disease with esophagitis    Herniated lumbar intervertebral disc    Hyperlipidemia    Hypertension    Primary insomnia    Low back pain    Myopia, bilateral    Nasal congestion    Nephrolithiasis    Obstructive sleep apnea syndrome    Other fatigue    Health examination of defined subpopulation    Other specified general medical examinations    Problems in relationship with spouse or partner    Sciatica    Stage 2 chronic kidney disease    Thrombosed external hemorrhoids    Regular astigmatism, bilateral    Abnormal EKG    Family history of heart disease    Peripheral retinal degeneration of both eyes    History of hemorrhoidectomy    Hemorrhoids    Elevated blood pressure reading    Decreased testosterone level    Low testosterone in male    Erectile dysfunction    Sleep disorder    Hypogonadism in male       Pt here for Annual Physical    ADHD  - on adderall 20 mg daily and 25 mg every morning    Controlled Substance Monitoring:    Acute and Chronic Pain Monitoring:   RX Monitoring Periodic Controlled Substance Monitoring   2024   8:42 AM No signs of potential drug abuse or diversion identified.       Low Libido  Low Testosterone  Chronic joint and muscle aches  - follows with Endo   - injection, every week  - feels that his joint aches and pain are better with started testosterone    Hyperlipidemia  - 218 in 2024  - monitor with testosterone    The 10-year ASCVD risk

## 2024-06-19 LAB
ESTRADIOL SERPL HS-MCNC: 55.6 PG/ML (ref 10–42)
ESTROGEN SERPL CALC-MCNC: 111.9 PG/ML (ref 19–69)
ESTRONE SERPL-MCNC: 56.3 PG/ML (ref 9–36)

## 2024-06-20 LAB
SHBG SERPL-SCNC: 22 NMOL/L (ref 17–56)
TESTOST FREE SERPL-MCNC: 224.9 PG/ML (ref 47–244)
TESTOST SERPL-MCNC: 805 NG/DL (ref 249–836)

## 2024-07-11 DIAGNOSIS — F90.9 ATTENTION DEFICIT HYPERACTIVITY DISORDER (ADHD), UNSPECIFIED ADHD TYPE: ICD-10-CM

## 2024-07-11 DIAGNOSIS — F90.2 ATTENTION DEFICIT HYPERACTIVITY DISORDER (ADHD), COMBINED TYPE: ICD-10-CM

## 2024-07-11 RX ORDER — DEXTROAMPHETAMINE SACCHARATE, AMPHETAMINE ASPARTATE MONOHYDRATE, DEXTROAMPHETAMINE SULFATE AND AMPHETAMINE SULFATE 6.25; 6.25; 6.25; 6.25 MG/1; MG/1; MG/1; MG/1
25 CAPSULE, EXTENDED RELEASE ORAL EVERY MORNING
Qty: 30 CAPSULE | Refills: 0 | Status: SHIPPED | OUTPATIENT
Start: 2024-07-11 | End: 2024-08-10

## 2024-07-11 RX ORDER — DEXTROAMPHETAMINE SACCHARATE, AMPHETAMINE ASPARTATE, DEXTROAMPHETAMINE SULFATE AND AMPHETAMINE SULFATE 5; 5; 5; 5 MG/1; MG/1; MG/1; MG/1
20 TABLET ORAL DAILY
Qty: 30 TABLET | Refills: 0 | Status: SHIPPED | OUTPATIENT
Start: 2024-07-11 | End: 2024-08-10

## 2024-07-11 NOTE — TELEPHONE ENCOUNTER
Medication:   Requested Prescriptions     Pending Prescriptions Disp Refills    amphetamine-dextroamphetamine (ADDERALL XR) 25 MG extended release capsule 30 capsule 0     Sig: Take 1 capsule by mouth every morning for 30 days.    amphetamine-dextroamphetamine (ADDERALL, 20MG,) 20 MG tablet 30 tablet 0     Sig: Take 1 tablet by mouth daily for 30 days. Max Daily Amount: 20 mg     Last Filled:  6.12.24    Last appt: 6/14/2024   Next appt: Visit date not found    Last OARRS:       6/14/2024     8:42 AM   RX Monitoring   Periodic Controlled Substance Monitoring No signs of potential drug abuse or diversion identified.

## 2024-07-26 ENCOUNTER — OFFICE VISIT (OUTPATIENT)
Dept: ENDOCRINOLOGY | Age: 49
End: 2024-07-26
Payer: OTHER GOVERNMENT

## 2024-07-26 VITALS
WEIGHT: 170 LBS | RESPIRATION RATE: 16 BRPM | SYSTOLIC BLOOD PRESSURE: 139 MMHG | HEIGHT: 68 IN | HEART RATE: 83 BPM | BODY MASS INDEX: 25.76 KG/M2 | DIASTOLIC BLOOD PRESSURE: 88 MMHG

## 2024-07-26 DIAGNOSIS — E34.8 EXCESS ESTROGEN IN MALE: ICD-10-CM

## 2024-07-26 DIAGNOSIS — E29.1 HYPOGONADISM IN MALE: Primary | ICD-10-CM

## 2024-07-26 DIAGNOSIS — N52.9 ERECTILE DYSFUNCTION, UNSPECIFIED ERECTILE DYSFUNCTION TYPE: ICD-10-CM

## 2024-07-26 DIAGNOSIS — R53.83 OTHER FATIGUE: ICD-10-CM

## 2024-07-26 PROCEDURE — 3079F DIAST BP 80-89 MM HG: CPT | Performed by: INTERNAL MEDICINE

## 2024-07-26 PROCEDURE — 99214 OFFICE O/P EST MOD 30 MIN: CPT | Performed by: INTERNAL MEDICINE

## 2024-07-26 PROCEDURE — 3075F SYST BP GE 130 - 139MM HG: CPT | Performed by: INTERNAL MEDICINE

## 2024-07-26 RX ORDER — TESTOSTERONE CYPIONATE 200 MG/ML
INJECTION, SOLUTION INTRAMUSCULAR
Qty: 3 ML | Refills: 2 | Status: SHIPPED | OUTPATIENT
Start: 2024-07-26 | End: 2024-10-04

## 2024-07-26 RX ORDER — ANASTROZOLE 1 MG/1
1 TABLET ORAL
Qty: 26 TABLET | Refills: 3 | Status: SHIPPED | OUTPATIENT
Start: 2024-07-29

## 2024-07-26 RX ORDER — SYRINGE W-NEEDLE,DISPOSAB,3 ML 25GX5/8"
SYRINGE, EMPTY DISPOSABLE MISCELLANEOUS
Qty: 9 EACH | Refills: 3 | Status: SHIPPED | OUTPATIENT
Start: 2024-07-26

## 2024-07-26 NOTE — PROGRESS NOTES
Patient ID:   Chidi Dobbs is a 49 y.o. male    Chief Complaint:   Chidi Dobbs is seen for an evaluation of hypogonadism     Subjective:   Chidi Dobbs in TRT since 2021.   Previously under treatment with [facility].     Before FOV he was taking Testosterone cypionate 0.8 ml SC twice a week     Interval history:   Testosterone Cypionate 1.0 ml IM every 10 days      Feels levels are more stable   Ess acne   Libido is okay   performance is okay   Energy levels are better   Has morning erections daily   Sleep is always an issue. Clinically he has PTSD    Shaves less often    Walking 5.3 miles on average daily (weather permitting)  No depression or anxiety     FOV:     Denies nipple discharge or gynecomastia   No head traumas   Fathered three kids. S/p vasectomy     Does not smoke   No alcohol   Denies use of drugs, opioids, steroids       The following portions of the patient's history were reviewed and updated as appropriate:        Family History   Problem Relation Age of Onset    Heart Attack Mother     High Blood Pressure Mother     High Cholesterol Mother     Breast Cancer Mother     Alcohol Abuse Mother     Heart Attack Father     Other Father         COVID    High Blood Pressure Father     High Cholesterol Father     Cancer Maternal Grandmother         breast     Heart Attack Maternal Grandfather     Cancer Paternal Grandmother         \"female region\"    Heart Attack Paternal Grandfather     Cancer Paternal Aunt     Heart Attack Paternal Uncle          Social History     Socioeconomic History    Marital status: Single     Spouse name: Not on file    Number of children: Not on file    Years of education: Not on file    Highest education level: Master's degree (e.g., MA, MS, Alan, MEd, MSW, SAMSON)   Occupational History    Not on file   Tobacco Use    Smoking status: Never     Passive exposure: Never    Smokeless tobacco: Never   Vaping Use    Vaping Use: Never used   Substance and Sexual Activity

## 2024-08-12 DIAGNOSIS — F90.9 ATTENTION DEFICIT HYPERACTIVITY DISORDER (ADHD), UNSPECIFIED ADHD TYPE: ICD-10-CM

## 2024-08-12 DIAGNOSIS — F90.2 ATTENTION DEFICIT HYPERACTIVITY DISORDER (ADHD), COMBINED TYPE: ICD-10-CM

## 2024-08-12 RX ORDER — DEXTROAMPHETAMINE SACCHARATE, AMPHETAMINE ASPARTATE MONOHYDRATE, DEXTROAMPHETAMINE SULFATE AND AMPHETAMINE SULFATE 6.25; 6.25; 6.25; 6.25 MG/1; MG/1; MG/1; MG/1
25 CAPSULE, EXTENDED RELEASE ORAL EVERY MORNING
Qty: 30 CAPSULE | Refills: 0 | Status: SHIPPED | OUTPATIENT
Start: 2024-08-12 | End: 2024-09-11

## 2024-08-12 RX ORDER — DEXTROAMPHETAMINE SACCHARATE, AMPHETAMINE ASPARTATE, DEXTROAMPHETAMINE SULFATE AND AMPHETAMINE SULFATE 5; 5; 5; 5 MG/1; MG/1; MG/1; MG/1
20 TABLET ORAL DAILY
Qty: 30 TABLET | Refills: 0 | Status: SHIPPED | OUTPATIENT
Start: 2024-08-12 | End: 2024-09-11

## 2024-08-12 NOTE — TELEPHONE ENCOUNTER
Medication:   Requested Prescriptions     Pending Prescriptions Disp Refills    amphetamine-dextroamphetamine (ADDERALL XR) 25 MG extended release capsule 30 capsule 0     Sig: Take 1 capsule by mouth every morning for 30 days.    amphetamine-dextroamphetamine (ADDERALL, 20MG,) 20 MG tablet 30 tablet 0     Sig: Take 1 tablet by mouth daily for 30 days. Max Daily Amount: 20 mg     Last Filled:  07/11/2024    Last appt: 6/14/2024   Next appt: Visit date not found    Last OARRS:       7/11/2024    11:01 AM   RX Monitoring   Periodic Controlled Substance Monitoring No signs of potential drug abuse or diversion identified.

## 2024-09-11 DIAGNOSIS — F90.9 ATTENTION DEFICIT HYPERACTIVITY DISORDER (ADHD), UNSPECIFIED ADHD TYPE: ICD-10-CM

## 2024-09-11 DIAGNOSIS — F90.2 ATTENTION DEFICIT HYPERACTIVITY DISORDER (ADHD), COMBINED TYPE: ICD-10-CM

## 2024-09-12 RX ORDER — DEXTROAMPHETAMINE SACCHARATE, AMPHETAMINE ASPARTATE MONOHYDRATE, DEXTROAMPHETAMINE SULFATE AND AMPHETAMINE SULFATE 6.25; 6.25; 6.25; 6.25 MG/1; MG/1; MG/1; MG/1
25 CAPSULE, EXTENDED RELEASE ORAL EVERY MORNING
Qty: 30 CAPSULE | Refills: 0 | Status: SHIPPED | OUTPATIENT
Start: 2024-09-12 | End: 2024-10-12

## 2024-09-12 RX ORDER — DEXTROAMPHETAMINE SACCHARATE, AMPHETAMINE ASPARTATE, DEXTROAMPHETAMINE SULFATE AND AMPHETAMINE SULFATE 5; 5; 5; 5 MG/1; MG/1; MG/1; MG/1
20 TABLET ORAL DAILY
Qty: 30 TABLET | Refills: 0 | Status: SHIPPED | OUTPATIENT
Start: 2024-09-12 | End: 2024-10-12

## 2024-09-17 PROBLEM — Z00.8 OTHER SPECIFIED GENERAL MEDICAL EXAMINATIONS: Status: RESOLVED | Noted: 2021-06-28 | Resolved: 2024-09-17

## 2024-09-18 ENCOUNTER — OFFICE VISIT (OUTPATIENT)
Dept: PRIMARY CARE CLINIC | Age: 49
End: 2024-09-18
Payer: OTHER GOVERNMENT

## 2024-09-18 VITALS
DIASTOLIC BLOOD PRESSURE: 84 MMHG | WEIGHT: 166 LBS | SYSTOLIC BLOOD PRESSURE: 132 MMHG | BODY MASS INDEX: 25.24 KG/M2 | TEMPERATURE: 97.7 F | RESPIRATION RATE: 16 BRPM | OXYGEN SATURATION: 98 % | HEART RATE: 95 BPM

## 2024-09-18 DIAGNOSIS — S91.104A OPEN WOUND OF THIRD TOE OF RIGHT FOOT, INITIAL ENCOUNTER: ICD-10-CM

## 2024-09-18 DIAGNOSIS — M79.674 PAIN OF TOE OF RIGHT FOOT: Primary | ICD-10-CM

## 2024-09-18 DIAGNOSIS — K21.9 GASTROESOPHAGEAL REFLUX DISEASE, UNSPECIFIED WHETHER ESOPHAGITIS PRESENT: ICD-10-CM

## 2024-09-18 PROCEDURE — 99213 OFFICE O/P EST LOW 20 MIN: CPT | Performed by: NURSE PRACTITIONER

## 2024-09-18 PROCEDURE — 3079F DIAST BP 80-89 MM HG: CPT | Performed by: NURSE PRACTITIONER

## 2024-09-18 PROCEDURE — 3075F SYST BP GE 130 - 139MM HG: CPT | Performed by: NURSE PRACTITIONER

## 2024-09-18 RX ORDER — OMEPRAZOLE 40 MG/1
40 CAPSULE, DELAYED RELEASE ORAL DAILY
Qty: 90 CAPSULE | Refills: 0 | Status: SHIPPED | OUTPATIENT
Start: 2024-09-18

## 2024-09-18 ASSESSMENT — ENCOUNTER SYMPTOMS
DIARRHEA: 0
VOMITING: 0
WHEEZING: 0
COUGH: 0
NAUSEA: 0
SHORTNESS OF BREATH: 0

## 2024-09-23 ENCOUNTER — TELEPHONE (OUTPATIENT)
Dept: PRIMARY CARE CLINIC | Age: 49
End: 2024-09-23

## 2024-09-30 DIAGNOSIS — I10 HYPERTENSION, UNSPECIFIED TYPE: ICD-10-CM

## 2024-09-30 RX ORDER — HYDROCHLOROTHIAZIDE 12.5 MG/1
12.5 CAPSULE ORAL EVERY MORNING
Qty: 30 CAPSULE | Refills: 2 | Status: SHIPPED | OUTPATIENT
Start: 2024-09-30

## 2024-09-30 NOTE — TELEPHONE ENCOUNTER
Medication:   Requested Prescriptions     Pending Prescriptions Disp Refills    hydroCHLOROthiazide 12.5 MG capsule [Pharmacy Med Name: HYDROCHLOROTHIAZIDE 12.5MG CAPSULES] 30 capsule 2     Sig: TAKE 1 CAPSULE BY MOUTH EVERY MORNING     Last Filled:  6.14.24    Last appt: 9/18/2024   Next appt: Visit date not found    Last OARRS:       7/11/2024    11:01 AM   RX Monitoring   Periodic Controlled Substance Monitoring No signs of potential drug abuse or diversion identified.

## 2024-10-10 DIAGNOSIS — F90.2 ATTENTION DEFICIT HYPERACTIVITY DISORDER (ADHD), COMBINED TYPE: ICD-10-CM

## 2024-10-10 DIAGNOSIS — F90.9 ATTENTION DEFICIT HYPERACTIVITY DISORDER (ADHD), UNSPECIFIED ADHD TYPE: ICD-10-CM

## 2024-10-10 RX ORDER — DEXTROAMPHETAMINE SACCHARATE, AMPHETAMINE ASPARTATE MONOHYDRATE, DEXTROAMPHETAMINE SULFATE AND AMPHETAMINE SULFATE 6.25; 6.25; 6.25; 6.25 MG/1; MG/1; MG/1; MG/1
25 CAPSULE, EXTENDED RELEASE ORAL EVERY MORNING
Qty: 30 CAPSULE | Refills: 0 | Status: SHIPPED | OUTPATIENT
Start: 2024-10-10 | End: 2024-11-09

## 2024-10-10 RX ORDER — DEXTROAMPHETAMINE SACCHARATE, AMPHETAMINE ASPARTATE, DEXTROAMPHETAMINE SULFATE AND AMPHETAMINE SULFATE 5; 5; 5; 5 MG/1; MG/1; MG/1; MG/1
20 TABLET ORAL DAILY
Qty: 30 TABLET | Refills: 0 | Status: SHIPPED | OUTPATIENT
Start: 2024-10-10 | End: 2024-11-09

## 2024-10-10 NOTE — TELEPHONE ENCOUNTER
Medication:   Requested Prescriptions     Pending Prescriptions Disp Refills    amphetamine-dextroamphetamine (ADDERALL XR) 25 MG extended release capsule 30 capsule 0     Sig: Take 1 capsule by mouth every morning for 30 days.    amphetamine-dextroamphetamine (ADDERALL, 20MG,) 20 MG tablet 30 tablet 0     Sig: Take 1 tablet by mouth daily for 30 days. Max Daily Amount: 20 mg     Last Filled:  09/12/2024  Last appt: 9/18/2024   Next appt: Visit date not found    Last OARRS:       7/11/2024    11:01 AM   RX Monitoring   Periodic Controlled Substance Monitoring No signs of potential drug abuse or diversion identified.

## 2024-10-21 DIAGNOSIS — N52.9 ERECTILE DYSFUNCTION, UNSPECIFIED ERECTILE DYSFUNCTION TYPE: ICD-10-CM

## 2024-10-21 DIAGNOSIS — E34.8 EXCESS ESTROGEN IN MALE: ICD-10-CM

## 2024-10-21 DIAGNOSIS — E29.1 HYPOGONADISM IN MALE: ICD-10-CM

## 2024-10-21 DIAGNOSIS — R53.83 OTHER FATIGUE: ICD-10-CM

## 2024-10-21 LAB
ALBUMIN SERPL-MCNC: 4.6 G/DL (ref 3.4–5)
ALBUMIN/GLOB SERPL: 2.4 {RATIO} (ref 1.1–2.2)
ALP SERPL-CCNC: 38 U/L (ref 40–129)
ALT SERPL-CCNC: 39 U/L (ref 10–40)
ANION GAP SERPL CALCULATED.3IONS-SCNC: 14 MMOL/L (ref 3–16)
AST SERPL-CCNC: 27 U/L (ref 15–37)
BASOPHILS # BLD: 0 K/UL (ref 0–0.2)
BASOPHILS NFR BLD: 0.8 %
BILIRUB SERPL-MCNC: 0.8 MG/DL (ref 0–1)
BUN SERPL-MCNC: 20 MG/DL (ref 7–20)
CALCIUM SERPL-MCNC: 10 MG/DL (ref 8.3–10.6)
CHLORIDE SERPL-SCNC: 98 MMOL/L (ref 99–110)
CO2 SERPL-SCNC: 27 MMOL/L (ref 21–32)
CREAT SERPL-MCNC: 1.2 MG/DL (ref 0.9–1.3)
DEPRECATED RDW RBC AUTO: 14.8 % (ref 12.4–15.4)
EOSINOPHIL # BLD: 0.1 K/UL (ref 0–0.6)
EOSINOPHIL NFR BLD: 4 %
GFR SERPLBLD CREATININE-BSD FMLA CKD-EPI: 74 ML/MIN/{1.73_M2}
GLUCOSE SERPL-MCNC: 90 MG/DL (ref 70–99)
HCT VFR BLD AUTO: 46.1 % (ref 40.5–52.5)
HGB BLD-MCNC: 16.4 G/DL (ref 13.5–17.5)
LYMPHOCYTES # BLD: 1.4 K/UL (ref 1–5.1)
LYMPHOCYTES NFR BLD: 40 %
MCH RBC QN AUTO: 30.9 PG (ref 26–34)
MCHC RBC AUTO-ENTMCNC: 35.5 G/DL (ref 31–36)
MCV RBC AUTO: 87.2 FL (ref 80–100)
MONOCYTES # BLD: 0.3 K/UL (ref 0–1.3)
MONOCYTES NFR BLD: 9.7 %
NEUTROPHILS # BLD: 1.6 K/UL (ref 1.7–7.7)
NEUTROPHILS NFR BLD: 45.5 %
PLATELET # BLD AUTO: 188 K/UL (ref 135–450)
PMV BLD AUTO: 8.7 FL (ref 5–10.5)
POTASSIUM SERPL-SCNC: 4.1 MMOL/L (ref 3.5–5.1)
PROT SERPL-MCNC: 6.5 G/DL (ref 6.4–8.2)
PSA SERPL DL<=0.01 NG/ML-MCNC: 1.04 NG/ML (ref 0–4)
RBC # BLD AUTO: 5.28 M/UL (ref 4.2–5.9)
SODIUM SERPL-SCNC: 139 MMOL/L (ref 136–145)
WBC # BLD AUTO: 3.5 K/UL (ref 4–11)

## 2024-10-23 LAB
SHBG SERPL-SCNC: 39 NMOL/L (ref 17–56)
TESTOST FREE SERPL-MCNC: ABNORMAL PG/ML (ref 47–244)
TESTOST SERPL-MCNC: >1500 NG/DL (ref 249–836)

## 2024-10-24 LAB
ESTRADIOL SERPL HS-MCNC: 55.4 PG/ML (ref 10–42)
ESTROGEN SERPL CALC-MCNC: 98.7 PG/ML (ref 19–69)
ESTRONE SERPL-MCNC: 43.3 PG/ML (ref 9–36)

## 2024-10-25 ENCOUNTER — OFFICE VISIT (OUTPATIENT)
Dept: ENDOCRINOLOGY | Age: 49
End: 2024-10-25
Payer: OTHER GOVERNMENT

## 2024-10-25 VITALS
DIASTOLIC BLOOD PRESSURE: 95 MMHG | BODY MASS INDEX: 26.83 KG/M2 | OXYGEN SATURATION: 98 % | SYSTOLIC BLOOD PRESSURE: 137 MMHG | WEIGHT: 177 LBS | HEART RATE: 100 BPM | HEIGHT: 68 IN

## 2024-10-25 DIAGNOSIS — R53.83 OTHER FATIGUE: ICD-10-CM

## 2024-10-25 DIAGNOSIS — D72.819 LEUKOPENIA, UNSPECIFIED TYPE: ICD-10-CM

## 2024-10-25 DIAGNOSIS — E34.8 EXCESS ESTROGEN IN MALE: ICD-10-CM

## 2024-10-25 DIAGNOSIS — N52.9 ERECTILE DYSFUNCTION, UNSPECIFIED ERECTILE DYSFUNCTION TYPE: ICD-10-CM

## 2024-10-25 DIAGNOSIS — E29.1 HYPOGONADISM IN MALE: Primary | ICD-10-CM

## 2024-10-25 DIAGNOSIS — E04.9 GOITER: ICD-10-CM

## 2024-10-25 PROCEDURE — 3075F SYST BP GE 130 - 139MM HG: CPT | Performed by: INTERNAL MEDICINE

## 2024-10-25 PROCEDURE — 99214 OFFICE O/P EST MOD 30 MIN: CPT | Performed by: INTERNAL MEDICINE

## 2024-10-25 PROCEDURE — 3080F DIAST BP >= 90 MM HG: CPT | Performed by: INTERNAL MEDICINE

## 2024-10-25 RX ORDER — ANASTROZOLE 1 MG/1
1 TABLET ORAL
Qty: 40 TABLET | Refills: 1 | Status: SHIPPED | OUTPATIENT
Start: 2024-10-25 | End: 2024-10-25 | Stop reason: SDUPTHER

## 2024-10-25 RX ORDER — TESTOSTERONE CYPIONATE 200 MG/ML
INJECTION, SOLUTION INTRAMUSCULAR
Qty: 3 ML | Refills: 3 | Status: SHIPPED | OUTPATIENT
Start: 2024-10-25 | End: 2025-01-03

## 2024-10-25 RX ORDER — ANASTROZOLE 1 MG/1
1 TABLET ORAL
Qty: 66 TABLET | Refills: 1 | Status: SHIPPED | OUTPATIENT
Start: 2024-10-25

## 2024-10-25 NOTE — PROGRESS NOTES
Patient ID:   Chidi Dobbs is a 49 y.o. male    Chief Complaint:   Chidi Dobbs is seen for an evaluation of hypogonadism     Subjective:   Chidi Dobbs in TRT since 2021.   Previously under treatment with [facility].     Before FOV he was taking Testosterone cypionate 0.8 ml SC twice a week     Interval history:   Testosterone Cypionate 1.0 ml IM every 10 days      Anastrozole 1 mg every other day     Energy levels are good   No acne   Libido is okay   performance is okay    Has morning erections daily   Sleep is always an issue. Clinically he has PTSD    Shaves less often    Walking 5.3 miles on average daily (weather permitting)  No depression or anxiety     FOV:     Denies nipple discharge or gynecomastia   No head traumas   Fathered three kids. S/p vasectomy     Does not smoke   No alcohol   Denies use of drugs, opioids, steroids       The following portions of the patient's history were reviewed and updated as appropriate:        Family History   Problem Relation Age of Onset    Heart Attack Mother     High Blood Pressure Mother     High Cholesterol Mother     Breast Cancer Mother     Alcohol Abuse Mother     Heart Attack Father     Other Father         COVID    High Blood Pressure Father     High Cholesterol Father     Cancer Maternal Grandmother         breast     Heart Attack Maternal Grandfather     Cancer Paternal Grandmother         \"female region\"    Heart Attack Paternal Grandfather     Cancer Paternal Aunt     Heart Attack Paternal Uncle          Social History     Socioeconomic History    Marital status: Single     Spouse name: Not on file    Number of children: Not on file    Years of education: Not on file    Highest education level: Master's degree (e.g., MA, MS, Alan, MEd, MSW, SAMSON)   Occupational History    Not on file   Tobacco Use    Smoking status: Never     Passive exposure: Never    Smokeless tobacco: Never   Vaping Use    Vaping status: Never Used   Substance and Sexual

## 2024-10-28 ENCOUNTER — TELEPHONE (OUTPATIENT)
Dept: PRIMARY CARE CLINIC | Age: 49
End: 2024-10-28

## 2024-10-28 NOTE — TELEPHONE ENCOUNTER
Spoke to patient and notified referrals with [facility] for endocrinology and hematology.     2nd referral for Endo pending review for next follow up appointment     Entered into media.

## 2024-10-28 NOTE — TELEPHONE ENCOUNTER
Pt needs a referral for Hematology, and thyroid Dr. Please contact pt for more info. He received a letter from Dr Cavazos, but he need a referral from his PCP.

## 2024-10-29 ENCOUNTER — TELEPHONE (OUTPATIENT)
Dept: ENDOCRINOLOGY | Age: 49
End: 2024-10-29

## 2024-10-29 DIAGNOSIS — E34.8 EXCESS ESTROGEN IN MALE: ICD-10-CM

## 2024-10-29 NOTE — TELEPHONE ENCOUNTER
Pharmacy called in checking on the script for the anastrozole.  It states one pill 5 times daily.  Is this correct?  They are asking if you could please correct it or cancel the script if it was sent over by mistake. Please reach out and let them know.     [id number] - [place], [place] - [address] - P [phone] - F [phone]  [address] [address]  Phone: [phone]  Fax: [phone]

## 2024-10-30 RX ORDER — ANASTROZOLE 1 MG/1
1 TABLET ORAL
Qty: 66 TABLET | Refills: 1 | Status: SHIPPED | OUTPATIENT
Start: 2024-10-30

## 2024-11-05 ENCOUNTER — TELEPHONE (OUTPATIENT)
Dept: ENDOCRINOLOGY | Age: 49
End: 2024-11-05

## 2024-11-11 DIAGNOSIS — F90.2 ATTENTION DEFICIT HYPERACTIVITY DISORDER (ADHD), COMBINED TYPE: ICD-10-CM

## 2024-11-11 DIAGNOSIS — F90.9 ATTENTION DEFICIT HYPERACTIVITY DISORDER (ADHD), UNSPECIFIED ADHD TYPE: ICD-10-CM

## 2024-11-11 NOTE — TELEPHONE ENCOUNTER
Medication:   Requested Prescriptions     Pending Prescriptions Disp Refills    amphetamine-dextroamphetamine (ADDERALL XR) 25 MG extended release capsule 30 capsule 0     Sig: Take 1 capsule by mouth every morning for 30 days.    amphetamine-dextroamphetamine (ADDERALL, 20MG,) 20 MG tablet 30 tablet 0     Sig: Take 1 tablet by mouth daily for 30 days. Max Daily Amount: 20 mg     Last Filled:  10/10/24    Last appt: 6/14/24  Next appt: Visit date not found    Last OARRS:       7/11/2024    11:01 AM   RX Monitoring   Periodic Controlled Substance Monitoring No signs of potential drug abuse or diversion identified.

## 2024-11-12 RX ORDER — DEXTROAMPHETAMINE SACCHARATE, AMPHETAMINE ASPARTATE MONOHYDRATE, DEXTROAMPHETAMINE SULFATE AND AMPHETAMINE SULFATE 6.25; 6.25; 6.25; 6.25 MG/1; MG/1; MG/1; MG/1
25 CAPSULE, EXTENDED RELEASE ORAL EVERY MORNING
Qty: 30 CAPSULE | Refills: 0 | Status: CANCELLED | OUTPATIENT
Start: 2024-11-12 | End: 2024-12-12

## 2024-11-12 RX ORDER — DEXTROAMPHETAMINE SACCHARATE, AMPHETAMINE ASPARTATE, DEXTROAMPHETAMINE SULFATE AND AMPHETAMINE SULFATE 5; 5; 5; 5 MG/1; MG/1; MG/1; MG/1
20 TABLET ORAL DAILY
Qty: 30 TABLET | Refills: 0 | Status: CANCELLED | OUTPATIENT
Start: 2024-11-12 | End: 2024-12-12

## 2024-11-14 ENCOUNTER — OFFICE VISIT (OUTPATIENT)
Dept: PRIMARY CARE CLINIC | Age: 49
End: 2024-11-14

## 2024-11-14 VITALS
SYSTOLIC BLOOD PRESSURE: 150 MMHG | BODY MASS INDEX: 28.22 KG/M2 | DIASTOLIC BLOOD PRESSURE: 96 MMHG | WEIGHT: 186.2 LBS | HEART RATE: 92 BPM | HEIGHT: 68 IN | TEMPERATURE: 97.4 F | OXYGEN SATURATION: 97 %

## 2024-11-14 DIAGNOSIS — I10 HYPERTENSION, UNSPECIFIED TYPE: Primary | ICD-10-CM

## 2024-11-14 DIAGNOSIS — F90.2 ATTENTION DEFICIT HYPERACTIVITY DISORDER (ADHD), COMBINED TYPE: ICD-10-CM

## 2024-11-14 DIAGNOSIS — Z23 NEED FOR INFLUENZA VACCINATION: ICD-10-CM

## 2024-11-14 PROBLEM — D72.819 LEUKOPENIA: Status: ACTIVE | Noted: 2024-11-14

## 2024-11-14 RX ORDER — DEXTROAMPHETAMINE SACCHARATE, AMPHETAMINE ASPARTATE, DEXTROAMPHETAMINE SULFATE AND AMPHETAMINE SULFATE 5; 5; 5; 5 MG/1; MG/1; MG/1; MG/1
20 TABLET ORAL DAILY
Qty: 30 TABLET | Refills: 0 | Status: SHIPPED | OUTPATIENT
Start: 2024-11-14 | End: 2024-12-14

## 2024-11-14 RX ORDER — DEXTROAMPHETAMINE SACCHARATE, AMPHETAMINE ASPARTATE MONOHYDRATE, DEXTROAMPHETAMINE SULFATE AND AMPHETAMINE SULFATE 6.25; 6.25; 6.25; 6.25 MG/1; MG/1; MG/1; MG/1
25 CAPSULE, EXTENDED RELEASE ORAL EVERY MORNING
Qty: 30 CAPSULE | Refills: 0 | Status: CANCELLED | OUTPATIENT
Start: 2024-11-14 | End: 2024-12-14

## 2024-11-14 RX ORDER — HYDROCHLOROTHIAZIDE 12.5 MG/1
12.5 CAPSULE ORAL EVERY MORNING
Qty: 30 CAPSULE | Refills: 2 | Status: CANCELLED | OUTPATIENT
Start: 2024-11-14

## 2024-11-14 RX ORDER — HYDROCHLOROTHIAZIDE 25 MG/1
25 TABLET ORAL EVERY MORNING
Qty: 90 TABLET | Refills: 1 | Status: SHIPPED | OUTPATIENT
Start: 2024-11-14

## 2024-11-14 RX ORDER — DEXTROAMPHETAMINE SACCHARATE, AMPHETAMINE ASPARTATE MONOHYDRATE, DEXTROAMPHETAMINE SULFATE AND AMPHETAMINE SULFATE 7.5; 7.5; 7.5; 7.5 MG/1; MG/1; MG/1; MG/1
30 CAPSULE, EXTENDED RELEASE ORAL DAILY
Qty: 30 CAPSULE | Refills: 0 | Status: SHIPPED | OUTPATIENT
Start: 2024-11-14 | End: 2024-12-14

## 2024-11-14 ASSESSMENT — ENCOUNTER SYMPTOMS
EYE ITCHING: 0
DIARRHEA: 0
NAUSEA: 0
COUGH: 0
VOMITING: 0
EYE DISCHARGE: 0
SORE THROAT: 0
SHORTNESS OF BREATH: 0
TROUBLE SWALLOWING: 0
ABDOMINAL PAIN: 0

## 2024-11-14 NOTE — PROGRESS NOTES
Chiid Dobbs (:  1975) is a 49 y.o. male,Established patient, here for evaluation of the following chief complaint(s):  ADHD      ASSESSMENT/PLAN:  1. Hypertension, unspecified type  Assessment & Plan:   Chronic, not at goal (unstable), changes as above  Orders:  -     hydroCHLOROthiazide (HYDRODIURIL) 25 MG tablet; Take 1 tablet by mouth every morning, Disp-90 tablet, R-1Normal  2. Attention deficit hyperactivity disorder (ADHD), combined type  Assessment & Plan:   Chronic, not at goal (unstable), see med changes above  Orders:  -     amphetamine-dextroamphetamine (ADDERALL, 20MG,) 20 MG tablet; Take 1 tablet by mouth daily for 30 days. Max Daily Amount: 20 mg, Disp-30 tablet, R-0Normal  -     amphetamine-dextroamphetamine (ADDERALL XR) 30 MG extended release capsule; Take 1 capsule by mouth daily for 30 days. Max Daily Amount: 30 mg, Disp-30 capsule, R-0Normal  3. Need for influenza vaccination  -     Influenza, FLUCELVAX Trivalent, (age 6 mo+) IM, Preservative Free, 0.5mL      Return in about 3 months (around 2025), or if symptoms worsen or fail to improve, for ADHD/HTN.    SUBJECTIVE/OBJECTIVE:  HPI    ADHD  - adderall 20 mg afternoon and adderall XR 25 mg morning       HTN  - BP high 160/102  - currently on hctz 12.5 mg  - repeat 150/96  - last 3-4 days - cream - shrimp and chicken soup - has stalk  - has not missed his medications   - denies any headaches or vision changes, no dizziness    Fam His of Thyroid Cancer  - mom, 2 aunts, cousin 1st male to have thyroid cancer  - following with Dr Lehman - scanning, genetic testing     Review of Systems   Constitutional:  Negative for appetite change, chills, fatigue and fever.   HENT:  Negative for congestion, ear pain, sneezing, sore throat and trouble swallowing.    Eyes:  Negative for discharge and itching.   Respiratory:  Negative for cough and shortness of breath.    Cardiovascular:  Negative for chest pain and leg swelling.   Gastrointestinal:

## 2024-11-15 ENCOUNTER — HOSPITAL ENCOUNTER (OUTPATIENT)
Dept: ULTRASOUND IMAGING | Age: 49
Discharge: HOME | End: 2024-11-15
Attending: INTERNAL MEDICINE
Payer: OTHER GOVERNMENT

## 2024-11-15 DIAGNOSIS — E04.9 GOITER: ICD-10-CM

## 2024-11-15 PROCEDURE — 76536 US EXAM OF HEAD AND NECK: CPT

## 2024-11-18 ENCOUNTER — TELEPHONE (OUTPATIENT)
Dept: ENDOCRINOLOGY | Age: 49
End: 2024-11-18

## 2024-12-09 ENCOUNTER — PATIENT MESSAGE (OUTPATIENT)
Dept: PRIMARY CARE CLINIC | Age: 49
End: 2024-12-09

## 2024-12-09 ENCOUNTER — TELEPHONE (OUTPATIENT)
Dept: PRIMARY CARE CLINIC | Age: 49
End: 2024-12-09

## 2024-12-09 NOTE — TELEPHONE ENCOUNTER
Both the CTs chest abdomen with contrast and CT soft tissue neck is in the chart ordered by Dr. Fallon Pham   Both of these were ordered on 11/14/2024  Can the radiology department not see the orders??    Addendum:  Message sent to Dr Velazquez's office regarding patient needing authorization to get this done through his insurance     Dr. Schawrtz

## 2024-12-09 NOTE — TELEPHONE ENCOUNTER
Patient called and stated to inform that he is scheduled with both CT scan on tomorrow (12/10).     He was informed that the order is not received by the provider.    He requested to call [phone]    Pl review and advice    thanks

## 2024-12-10 ENCOUNTER — TELEPHONE (OUTPATIENT)
Dept: PRIMARY CARE CLINIC | Age: 49
End: 2024-12-10

## 2024-12-10 NOTE — TELEPHONE ENCOUNTER
Mer said can put it in order. Need two CT scan or give verbal authorization by #. Pls call pt back to get full details.    Pt  is out of [place] for 30 days.   Pt wants Dr Schwartz to call him.   Pt had to cancel his scans for today.

## 2024-12-15 DIAGNOSIS — E29.1 HYPOGONADISM IN MALE: ICD-10-CM

## 2024-12-15 DIAGNOSIS — R53.83 OTHER FATIGUE: ICD-10-CM

## 2024-12-15 DIAGNOSIS — F90.2 ATTENTION DEFICIT HYPERACTIVITY DISORDER (ADHD), COMBINED TYPE: ICD-10-CM

## 2024-12-15 DIAGNOSIS — E34.8 EXCESS ESTROGEN IN MALE: ICD-10-CM

## 2024-12-15 DIAGNOSIS — N52.9 ERECTILE DYSFUNCTION, UNSPECIFIED ERECTILE DYSFUNCTION TYPE: ICD-10-CM

## 2024-12-16 RX ORDER — DEXTROAMPHETAMINE SACCHARATE, AMPHETAMINE ASPARTATE MONOHYDRATE, DEXTROAMPHETAMINE SULFATE AND AMPHETAMINE SULFATE 7.5; 7.5; 7.5; 7.5 MG/1; MG/1; MG/1; MG/1
30 CAPSULE, EXTENDED RELEASE ORAL DAILY
Qty: 30 CAPSULE | Refills: 0 | Status: SHIPPED | OUTPATIENT
Start: 2024-12-16 | End: 2024-12-17 | Stop reason: SDUPTHER

## 2024-12-16 RX ORDER — ANASTROZOLE 1 MG/1
1 TABLET ORAL
Qty: 66 TABLET | Refills: 1 | OUTPATIENT
Start: 2024-12-16

## 2024-12-16 RX ORDER — TESTOSTERONE CYPIONATE 200 MG/ML
INJECTION, SOLUTION INTRAMUSCULAR
Qty: 3 ML | Refills: 3 | OUTPATIENT
Start: 2024-12-16 | End: 2025-02-23

## 2024-12-16 RX ORDER — DEXTROAMPHETAMINE SACCHARATE, AMPHETAMINE ASPARTATE, DEXTROAMPHETAMINE SULFATE AND AMPHETAMINE SULFATE 5; 5; 5; 5 MG/1; MG/1; MG/1; MG/1
20 TABLET ORAL DAILY
Qty: 30 TABLET | Refills: 0 | Status: SHIPPED | OUTPATIENT
Start: 2024-12-16 | End: 2025-01-15

## 2024-12-16 NOTE — TELEPHONE ENCOUNTER
Medication:   Requested Prescriptions     Pending Prescriptions Disp Refills    amphetamine-dextroamphetamine (ADDERALL, 20MG,) 20 MG tablet 30 tablet 0     Sig: Take 1 tablet by mouth daily for 30 days. Max Daily Amount: 20 mg    amphetamine-dextroamphetamine (ADDERALL XR) 30 MG extended release capsule 30 capsule 0     Sig: Take 1 capsule by mouth daily for 30 days. Max Daily Amount: 30 mg     Last Filled:  11.14.24    Last appt: 11/14/2024   Next appt: Visit date not found    Last OARRS:       11/14/2024     1:43 PM   RX Monitoring   Periodic Controlled Substance Monitoring No signs of potential drug abuse or diversion identified.

## 2024-12-17 ENCOUNTER — PATIENT MESSAGE (OUTPATIENT)
Dept: PRIMARY CARE CLINIC | Age: 49
End: 2024-12-17

## 2024-12-17 DIAGNOSIS — F90.2 ATTENTION DEFICIT HYPERACTIVITY DISORDER (ADHD), COMBINED TYPE: ICD-10-CM

## 2024-12-17 RX ORDER — DEXTROAMPHETAMINE SACCHARATE, AMPHETAMINE ASPARTATE MONOHYDRATE, DEXTROAMPHETAMINE SULFATE AND AMPHETAMINE SULFATE 7.5; 7.5; 7.5; 7.5 MG/1; MG/1; MG/1; MG/1
30 CAPSULE, EXTENDED RELEASE ORAL DAILY
Qty: 30 CAPSULE | Refills: 0 | Status: SHIPPED | OUTPATIENT
Start: 2024-12-17 | End: 2025-01-16

## 2024-12-17 NOTE — TELEPHONE ENCOUNTER
Medication:   Requested Prescriptions     Pending Prescriptions Disp Refills    amphetamine-dextroamphetamine (ADDERALL XR) 30 MG extended release capsule 30 capsule 0     Sig: Take 1 capsule by mouth daily for 30 days. Max Daily Amount: 30 mg     Last Filled:      Last appt: 11/14/2024   Next appt: Visit date not found    Last OARRS:       12/16/2024     2:31 PM   RX Monitoring   Periodic Controlled Substance Monitoring No signs of potential drug abuse or diversion identified.

## 2024-12-19 ENCOUNTER — TELEPHONE (OUTPATIENT)
Dept: PRIMARY CARE CLINIC | Age: 49
End: 2024-12-19

## 2024-12-19 NOTE — TELEPHONE ENCOUNTER
Medication Refill Status  Patient following up on status of medication refill  amphetamine-dextroamphetamine (ADDERALL XR) 30 MG extended release capsule     Provided e-Prescribing status update  Patient will follow up with [facility] for availability

## 2024-12-20 DIAGNOSIS — K21.9 GASTROESOPHAGEAL REFLUX DISEASE, UNSPECIFIED WHETHER ESOPHAGITIS PRESENT: ICD-10-CM

## 2024-12-20 RX ORDER — OMEPRAZOLE 40 MG/1
40 CAPSULE, DELAYED RELEASE ORAL DAILY
Qty: 30 CAPSULE | Refills: 0 | Status: SHIPPED | OUTPATIENT
Start: 2024-12-20

## 2024-12-20 NOTE — TELEPHONE ENCOUNTER
Medication:   Requested Prescriptions     Pending Prescriptions Disp Refills    omeprazole (PRILOSEC) 40 MG delayed release capsule [Pharmacy Med Name: OMEPRAZOLE 40MG CAPSULES] 30 capsule 0     Sig: TAKE 1 CAPSULE BY MOUTH DAILY     Last Filled:  9/18/2024    Last appt: 11/14/2024   Next appt: Visit date not found    Last OARRS:       12/16/2024     2:31 PM   RX Monitoring   Periodic Controlled Substance Monitoring No signs of potential drug abuse or diversion identified.

## 2025-01-09 ENCOUNTER — HOSPITAL ENCOUNTER (OUTPATIENT)
Dept: CT IMAGING | Age: 50
Discharge: HOME | End: 2025-01-09
Attending: INTERNAL MEDICINE
Payer: OTHER GOVERNMENT

## 2025-01-09 DIAGNOSIS — D72.818 OTHER DECREASED WHITE BLOOD CELL COUNT: ICD-10-CM

## 2025-01-09 PROCEDURE — 71260 CT THORAX DX C+: CPT

## 2025-01-09 PROCEDURE — 6360000004 HC RX CONTRAST MEDICATION: Performed by: INTERNAL MEDICINE

## 2025-01-09 PROCEDURE — 70491 CT SOFT TISSUE NECK W/DYE: CPT

## 2025-01-09 RX ORDER — IOPAMIDOL 755 MG/ML
100 INJECTION, SOLUTION INTRAVASCULAR
Status: COMPLETED | OUTPATIENT
Start: 2025-01-09 | End: 2025-01-09

## 2025-01-09 RX ADMIN — IOPAMIDOL 100 ML: 755 INJECTION, SOLUTION INTRAVENOUS at 13:33

## 2025-01-13 DIAGNOSIS — E34.8 EXCESS ESTROGEN IN MALE: ICD-10-CM

## 2025-01-13 RX ORDER — ANASTROZOLE 1 MG/1
1 TABLET ORAL
Qty: 66 TABLET | Refills: 0 | Status: SHIPPED | OUTPATIENT
Start: 2025-01-13

## 2025-01-13 NOTE — TELEPHONE ENCOUNTER
Medication:   Requested Prescriptions     Pending Prescriptions Disp Refills    anastrozole (ARIMIDEX) 1 MG tablet 66 tablet 1     Sig: Take 1 tablet by mouth Five times weekly       Last Filled:      Patient Phone Number: [phone] (home)     Last appt: 10/25/2024   Next appt: 1/31/2025    Last Labs DM:   Lab Results   Component Value Date/Time    LABA1C 4.9 05/17/2024 09:23 AM     Last Lipid:   Lab Results   Component Value Date/Time    CHOL 218 05/17/2024 09:23 AM    TRIG 78 05/17/2024 09:23 AM    HDL 53 05/17/2024 09:23 AM     Last PSA:   Lab Results   Component Value Date/Time    PSA 1.04 10/21/2024 08:10 AM     Last Thyroid:   Lab Results   Component Value Date/Time    TSH 2.94 04/16/2024 09:46 AM    TSH 3.84 05/17/2023 08:44 AM    FT3 4.2 04/16/2024 09:46 AM    T4FREE 1.1 04/16/2024 09:46 AM

## 2025-01-14 DIAGNOSIS — R53.83 OTHER FATIGUE: ICD-10-CM

## 2025-01-14 DIAGNOSIS — F90.2 ATTENTION DEFICIT HYPERACTIVITY DISORDER (ADHD), COMBINED TYPE: ICD-10-CM

## 2025-01-14 DIAGNOSIS — N52.9 ERECTILE DYSFUNCTION, UNSPECIFIED ERECTILE DYSFUNCTION TYPE: ICD-10-CM

## 2025-01-14 DIAGNOSIS — E29.1 HYPOGONADISM IN MALE: ICD-10-CM

## 2025-01-15 RX ORDER — TESTOSTERONE CYPIONATE 200 MG/ML
INJECTION, SOLUTION INTRAMUSCULAR
Qty: 3 ML | Refills: 0 | OUTPATIENT
Start: 2025-01-15 | End: 2025-03-25

## 2025-01-15 RX ORDER — DEXTROAMPHETAMINE SACCHARATE, AMPHETAMINE ASPARTATE, DEXTROAMPHETAMINE SULFATE AND AMPHETAMINE SULFATE 5; 5; 5; 5 MG/1; MG/1; MG/1; MG/1
20 TABLET ORAL DAILY
Qty: 30 TABLET | Refills: 0 | Status: SHIPPED | OUTPATIENT
Start: 2025-01-15 | End: 2025-02-14

## 2025-01-15 RX ORDER — DEXTROAMPHETAMINE SACCHARATE, AMPHETAMINE ASPARTATE MONOHYDRATE, DEXTROAMPHETAMINE SULFATE AND AMPHETAMINE SULFATE 7.5; 7.5; 7.5; 7.5 MG/1; MG/1; MG/1; MG/1
30 CAPSULE, EXTENDED RELEASE ORAL DAILY
Qty: 30 CAPSULE | Refills: 0 | Status: SHIPPED | OUTPATIENT
Start: 2025-01-15 | End: 2025-02-14

## 2025-01-15 NOTE — TELEPHONE ENCOUNTER
Medication:   Requested Prescriptions     Pending Prescriptions Disp Refills    amphetamine-dextroamphetamine (ADDERALL, 20MG,) 20 MG tablet 30 tablet 0     Sig: Take 1 tablet by mouth daily for 30 days. Max Daily Amount: 20 mg    amphetamine-dextroamphetamine (ADDERALL XR) 30 MG extended release capsule 30 capsule 0     Sig: Take 1 capsule by mouth daily for 30 days. Max Daily Amount: 30 mg     Last Filled:  adderall 30mg - 12/17/2024  Adderall 20mg - 12/16/2024    Last appt: 11/14/2024   Next appt: Visit date not found    Last OARRS:       12/16/2024     2:31 PM   RX Monitoring   Periodic Controlled Substance Monitoring No signs of potential drug abuse or diversion identified.

## 2025-01-15 NOTE — TELEPHONE ENCOUNTER
Medication:   Requested Prescriptions     Pending Prescriptions Disp Refills    testosterone cypionate (DEPOTESTOTERONE CYPIONATE) 200 MG/ML injection 3 mL 3     Sig: Inject 0.8 ml IM every 10 days       Last Filled:      Patient Phone Number: [phone] (home)     Last appt: 10/25/2024   Next appt: 1/31/2025    Last Labs DM:   Lab Results   Component Value Date/Time    LABA1C 4.9 05/17/2024 09:23 AM     Last Lipid:   Lab Results   Component Value Date/Time    CHOL 218 05/17/2024 09:23 AM    TRIG 78 05/17/2024 09:23 AM    HDL 53 05/17/2024 09:23 AM     Last PSA:   Lab Results   Component Value Date/Time    PSA 1.04 10/21/2024 08:10 AM     Last Thyroid:   Lab Results   Component Value Date/Time    TSH 2.94 04/16/2024 09:46 AM    TSH 3.84 05/17/2023 08:44 AM    FT3 4.2 04/16/2024 09:46 AM    T4FREE 1.1 04/16/2024 09:46 AM

## 2025-01-17 DIAGNOSIS — M79.642 BILATERAL HAND PAIN: ICD-10-CM

## 2025-01-17 DIAGNOSIS — H65.03 NON-RECURRENT ACUTE SEROUS OTITIS MEDIA OF BOTH EARS: ICD-10-CM

## 2025-01-17 DIAGNOSIS — M79.641 BILATERAL HAND PAIN: ICD-10-CM

## 2025-01-17 DIAGNOSIS — M25.551 RIGHT HIP PAIN: ICD-10-CM

## 2025-01-17 DIAGNOSIS — M25.561 RIGHT KNEE PAIN, UNSPECIFIED CHRONICITY: ICD-10-CM

## 2025-01-17 RX ORDER — MELOXICAM 7.5 MG/1
7.5 TABLET ORAL 2 TIMES DAILY PRN
Qty: 60 TABLET | Refills: 1 | Status: SHIPPED | OUTPATIENT
Start: 2025-01-17

## 2025-01-17 RX ORDER — PREDNISONE 10 MG/1
TABLET ORAL
Qty: 18 TABLET | Refills: 0 | OUTPATIENT
Start: 2025-01-17

## 2025-01-17 NOTE — TELEPHONE ENCOUNTER
Medication:   Requested Prescriptions     Pending Prescriptions Disp Refills    meloxicam (MOBIC) 7.5 MG tablet [Pharmacy Med Name: MELOXICAM 7.5MG TABLETS] 60 tablet 1     Sig: TAKE 1 TABLET BY MOUTH TWICE DAILY AS NEEDED FOR PAIN     Last filled: 6/13/24  Last appt: 11/14/2024   Next appt: Visit date not found    Last OARRS:       12/16/2024     2:31 PM   RX Monitoring   Periodic Controlled Substance Monitoring No signs of potential drug abuse or diversion identified.

## 2025-01-21 ENCOUNTER — TELEPHONE (OUTPATIENT)
Dept: ENDOCRINOLOGY | Age: 50
End: 2025-01-21

## 2025-01-27 DIAGNOSIS — K21.9 GASTROESOPHAGEAL REFLUX DISEASE, UNSPECIFIED WHETHER ESOPHAGITIS PRESENT: ICD-10-CM

## 2025-01-27 RX ORDER — OMEPRAZOLE 40 MG/1
40 CAPSULE, DELAYED RELEASE ORAL DAILY
Qty: 90 CAPSULE | Refills: 1 | Status: SHIPPED | OUTPATIENT
Start: 2025-01-27

## 2025-01-27 NOTE — TELEPHONE ENCOUNTER
Medication:   Requested Prescriptions     Pending Prescriptions Disp Refills    omeprazole (PRILOSEC) 40 MG delayed release capsule [Pharmacy Med Name: OMEPRAZOLE 40MG CAPSULES] 90 capsule      Sig: TAKE 1 CAPSULE BY MOUTH DAILY     Last Filled:  12.20.24    Last appt: 11/14/2024   Next appt: Visit date not found    Last OARRS:       12/16/2024     2:31 PM   RX Monitoring   Periodic Controlled Substance Monitoring No signs of potential drug abuse or diversion identified.

## 2025-01-29 DIAGNOSIS — E29.1 HYPOGONADISM IN MALE: ICD-10-CM

## 2025-01-29 DIAGNOSIS — E34.8 EXCESS ESTROGEN IN MALE: ICD-10-CM

## 2025-01-29 DIAGNOSIS — R53.83 OTHER FATIGUE: ICD-10-CM

## 2025-01-29 DIAGNOSIS — N52.9 ERECTILE DYSFUNCTION, UNSPECIFIED ERECTILE DYSFUNCTION TYPE: ICD-10-CM

## 2025-01-30 LAB
ALBUMIN SERPL-MCNC: 4.8 G/DL (ref 3.4–5)
ALBUMIN/GLOB SERPL: 2 {RATIO} (ref 1.1–2.2)
ALP SERPL-CCNC: 42 U/L (ref 40–129)
ALT SERPL-CCNC: 48 U/L (ref 10–40)
ANION GAP SERPL CALCULATED.3IONS-SCNC: 11 MMOL/L (ref 3–16)
AST SERPL-CCNC: 31 U/L (ref 15–37)
BASOPHILS # BLD: 0 K/UL (ref 0–0.2)
BASOPHILS NFR BLD: 1 %
BILIRUB SERPL-MCNC: 0.5 MG/DL (ref 0–1)
BUN SERPL-MCNC: 11 MG/DL (ref 7–20)
CALCIUM SERPL-MCNC: 10.4 MG/DL (ref 8.3–10.6)
CHLORIDE SERPL-SCNC: 102 MMOL/L (ref 99–110)
CO2 SERPL-SCNC: 30 MMOL/L (ref 21–32)
CREAT SERPL-MCNC: 1.2 MG/DL (ref 0.9–1.3)
DEPRECATED RDW RBC AUTO: 14.2 % (ref 12.4–15.4)
EOSINOPHIL # BLD: 0.1 K/UL (ref 0–0.6)
EOSINOPHIL NFR BLD: 3.1 %
GFR SERPLBLD CREATININE-BSD FMLA CKD-EPI: 74 ML/MIN/{1.73_M2}
GLUCOSE SERPL-MCNC: 87 MG/DL (ref 70–99)
HCT VFR BLD AUTO: 50.8 % (ref 40.5–52.5)
HGB BLD-MCNC: 17.3 G/DL (ref 13.5–17.5)
LYMPHOCYTES # BLD: 1.3 K/UL (ref 1–5.1)
LYMPHOCYTES NFR BLD: 33.8 %
MCH RBC QN AUTO: 31 PG (ref 26–34)
MCHC RBC AUTO-ENTMCNC: 34.1 G/DL (ref 31–36)
MCV RBC AUTO: 90.7 FL (ref 80–100)
MONOCYTES # BLD: 0.3 K/UL (ref 0–1.3)
MONOCYTES NFR BLD: 8.1 %
NEUTROPHILS # BLD: 2 K/UL (ref 1.7–7.7)
NEUTROPHILS NFR BLD: 54 %
PLATELET # BLD AUTO: 195 K/UL (ref 135–450)
PMV BLD AUTO: 8.1 FL (ref 5–10.5)
POTASSIUM SERPL-SCNC: 4.5 MMOL/L (ref 3.5–5.1)
PROT SERPL-MCNC: 7.2 G/DL (ref 6.4–8.2)
RBC # BLD AUTO: 5.59 M/UL (ref 4.2–5.9)
SODIUM SERPL-SCNC: 143 MMOL/L (ref 136–145)
WBC # BLD AUTO: 3.7 K/UL (ref 4–11)

## 2025-01-30 NOTE — PROGRESS NOTES
Patient ID:   Chidi Dobbs is a 49 y.o. male    Chief Complaint:   Chidi Dobbs is seen for an evaluation of hypogonadism     Subjective:   Chidi Dobbs in TRT since 2021.   Previously under treatment with Serenity schwartz.     Before FOV he was taking Testosterone cypionate 0.8 ml SC twice a week     Interval history:   Testosterone Cypionate.8 0 ml IM every 10 days      Anastrozole 1 mg to five days a week. He has a pill box. He may have missed a few days 1-2 weeks ago. .     Energy levels are good   No acne   Libido is okay   performance is okay  Has morning erections daily   Sleep is better. Clinically he has PTSD    Just started 3 miles walk 2 days ago, and legs at gym. Some what limited due to corn (weather permitting)  No depression or anxiety     FOV:     Denies nipple discharge or gynecomastia   No head traumas   Fathered three kids. S/p vasectomy     Does not smoke   No alcohol   Denies use of drugs, opioids, steroids       The following portions of the patient's history were reviewed and updated as appropriate:        Family History   Problem Relation Age of Onset    Heart Attack Mother     High Blood Pressure Mother     High Cholesterol Mother     Breast Cancer Mother     Alcohol Abuse Mother     Heart Attack Father     Other Father         COVID    High Blood Pressure Father     High Cholesterol Father     Cancer Maternal Grandmother         breast     Heart Attack Maternal Grandfather     Cancer Paternal Grandmother         \"female region\"    Heart Attack Paternal Grandfather     Cancer Paternal Aunt     Heart Attack Paternal Uncle          Social History     Socioeconomic History    Marital status: Single     Spouse name: Not on file    Number of children: Not on file    Years of education: Not on file    Highest education level: Master's degree (e.g., MA, MS, Alan, MEd, MSW, SAMSON)   Occupational History    Not on file   Tobacco Use    Smoking status: Never     Passive exposure: Never

## 2025-01-31 ENCOUNTER — OFFICE VISIT (OUTPATIENT)
Dept: ENDOCRINOLOGY | Age: 50
End: 2025-01-31
Payer: OTHER GOVERNMENT

## 2025-01-31 VITALS
HEIGHT: 68 IN | SYSTOLIC BLOOD PRESSURE: 144 MMHG | WEIGHT: 181.2 LBS | OXYGEN SATURATION: 97 % | DIASTOLIC BLOOD PRESSURE: 98 MMHG | HEART RATE: 92 BPM | BODY MASS INDEX: 27.46 KG/M2

## 2025-01-31 DIAGNOSIS — R53.83 OTHER FATIGUE: ICD-10-CM

## 2025-01-31 DIAGNOSIS — E29.1 HYPOGONADISM IN MALE: Primary | ICD-10-CM

## 2025-01-31 DIAGNOSIS — N52.9 ERECTILE DYSFUNCTION, UNSPECIFIED ERECTILE DYSFUNCTION TYPE: ICD-10-CM

## 2025-01-31 DIAGNOSIS — E34.8 EXCESS ESTROGEN IN MALE: ICD-10-CM

## 2025-01-31 LAB
SHBG SERPL-SCNC: 38 NMOL/L (ref 10–60)
TESTOST FREE SERPL-MCNC: 108.1 PG/ML (ref 47–244)
TESTOST SERPL-MCNC: 552 NG/DL (ref 249–836)

## 2025-01-31 PROCEDURE — 99214 OFFICE O/P EST MOD 30 MIN: CPT | Performed by: INTERNAL MEDICINE

## 2025-01-31 PROCEDURE — 3080F DIAST BP >= 90 MM HG: CPT | Performed by: INTERNAL MEDICINE

## 2025-01-31 PROCEDURE — 3074F SYST BP LT 130 MM HG: CPT | Performed by: INTERNAL MEDICINE

## 2025-01-31 RX ORDER — TESTOSTERONE CYPIONATE 200 MG/ML
INJECTION, SOLUTION INTRAMUSCULAR
Qty: 3 ML | Refills: 3 | Status: SHIPPED | OUTPATIENT
Start: 2025-01-31 | End: 2025-04-11

## 2025-02-03 LAB
ESTRADIOL SERPL HS-MCNC: 10.1 PG/ML (ref 10–42)
ESTROGEN SERPL CALC-MCNC: 17.9 PG/ML (ref 19–69)
ESTRONE SERPL-MCNC: 7.8 PG/ML (ref 9–36)

## 2025-02-12 DIAGNOSIS — F90.2 ATTENTION DEFICIT HYPERACTIVITY DISORDER (ADHD), COMBINED TYPE: ICD-10-CM

## 2025-02-12 RX ORDER — DEXTROAMPHETAMINE SACCHARATE, AMPHETAMINE ASPARTATE, DEXTROAMPHETAMINE SULFATE AND AMPHETAMINE SULFATE 5; 5; 5; 5 MG/1; MG/1; MG/1; MG/1
20 TABLET ORAL DAILY
Qty: 30 TABLET | Refills: 0 | Status: SHIPPED | OUTPATIENT
Start: 2025-02-12 | End: 2025-03-14

## 2025-02-12 RX ORDER — DEXTROAMPHETAMINE SACCHARATE, AMPHETAMINE ASPARTATE MONOHYDRATE, DEXTROAMPHETAMINE SULFATE AND AMPHETAMINE SULFATE 7.5; 7.5; 7.5; 7.5 MG/1; MG/1; MG/1; MG/1
30 CAPSULE, EXTENDED RELEASE ORAL DAILY
Qty: 30 CAPSULE | Refills: 0 | Status: SHIPPED | OUTPATIENT
Start: 2025-02-12 | End: 2025-03-14

## 2025-02-12 NOTE — TELEPHONE ENCOUNTER
Pt has appointment schedule tomorrow. He would prefer his medication gets filled and process today so there is not a 24 hor delay in him getting his medication. Pt states he sometime have trouble getting his medicaton on time @ [facility]'s

## 2025-02-12 NOTE — TELEPHONE ENCOUNTER
Medication:   Requested Prescriptions     Pending Prescriptions Disp Refills    amphetamine-dextroamphetamine (ADDERALL, 20MG,) 20 MG tablet 30 tablet 0     Sig: Take 1 tablet by mouth daily for 30 days. Max Daily Amount: 20 mg    amphetamine-dextroamphetamine (ADDERALL XR) 30 MG extended release capsule 30 capsule 0     Sig: Take 1 capsule by mouth daily for 30 days. Max Daily Amount: 30 mg     Last Filled:  1.15.25    Last appt: 11/14/2024   Next appt: Visit date not found  Sent mcm due for ov    Last OARRS:       12/16/2024     2:31 PM   RX Monitoring   Periodic Controlled Substance Monitoring No signs of potential drug abuse or diversion identified.

## 2025-02-13 ENCOUNTER — OFFICE VISIT (OUTPATIENT)
Dept: PRIMARY CARE CLINIC | Age: 50
End: 2025-02-13
Payer: OTHER GOVERNMENT

## 2025-02-13 VITALS
HEART RATE: 90 BPM | OXYGEN SATURATION: 97 % | TEMPERATURE: 97.6 F | BODY MASS INDEX: 27.92 KG/M2 | DIASTOLIC BLOOD PRESSURE: 80 MMHG | SYSTOLIC BLOOD PRESSURE: 132 MMHG | HEIGHT: 68 IN | WEIGHT: 184.2 LBS

## 2025-02-13 DIAGNOSIS — D72.819 LEUKOPENIA, UNSPECIFIED TYPE: ICD-10-CM

## 2025-02-13 DIAGNOSIS — I10 PRIMARY HYPERTENSION: ICD-10-CM

## 2025-02-13 DIAGNOSIS — F90.9 ATTENTION DEFICIT HYPERACTIVITY DISORDER (ADHD), UNSPECIFIED ADHD TYPE: Primary | ICD-10-CM

## 2025-02-13 DIAGNOSIS — E29.1 HYPOGONADISM IN MALE: ICD-10-CM

## 2025-02-13 PROBLEM — Z63.0 PROBLEMS IN RELATIONSHIP WITH SPOUSE OR PARTNER: Status: RESOLVED | Noted: 2017-08-25 | Resolved: 2025-02-13

## 2025-02-13 PROBLEM — M76.60 ACHILLES TENDINITIS: Status: RESOLVED | Noted: 2021-06-28 | Resolved: 2025-02-13

## 2025-02-13 PROBLEM — R03.0 ELEVATED BLOOD PRESSURE READING: Status: RESOLVED | Noted: 2023-05-17 | Resolved: 2025-02-13

## 2025-02-13 PROBLEM — Z48.89 ENCOUNTER FOR POSTOPERATIVE CARE: Status: RESOLVED | Noted: 2021-06-28 | Resolved: 2025-02-13

## 2025-02-13 PROBLEM — R79.89 LOW TESTOSTERONE IN MALE: Status: RESOLVED | Noted: 2023-12-19 | Resolved: 2025-02-13

## 2025-02-13 PROBLEM — Z02.89 HEALTH EXAMINATION OF DEFINED SUBPOPULATION: Status: RESOLVED | Noted: 2021-06-28 | Resolved: 2025-02-13

## 2025-02-13 PROBLEM — M25.539 ARTHRALGIA OF WRIST: Status: RESOLVED | Noted: 2021-06-28 | Resolved: 2025-02-13

## 2025-02-13 PROBLEM — R03.0 FINDING OF ABOVE NORMAL BLOOD PRESSURE: Status: RESOLVED | Noted: 2021-06-28 | Resolved: 2025-02-13

## 2025-02-13 PROBLEM — D50.9 IRON DEFICIENCY ANEMIA: Status: ACTIVE | Noted: 2025-02-13

## 2025-02-13 PROBLEM — R53.83 OTHER FATIGUE: Status: RESOLVED | Noted: 2021-06-28 | Resolved: 2025-02-13

## 2025-02-13 PROBLEM — R09.81 NASAL CONGESTION: Status: RESOLVED | Noted: 2021-06-28 | Resolved: 2025-02-13

## 2025-02-13 PROCEDURE — 3075F SYST BP GE 130 - 139MM HG: CPT | Performed by: FAMILY MEDICINE

## 2025-02-13 PROCEDURE — 99214 OFFICE O/P EST MOD 30 MIN: CPT | Performed by: FAMILY MEDICINE

## 2025-02-13 PROCEDURE — 3079F DIAST BP 80-89 MM HG: CPT | Performed by: FAMILY MEDICINE

## 2025-02-13 SDOH — ECONOMIC STABILITY: FOOD INSECURITY: WITHIN THE PAST 12 MONTHS, THE FOOD YOU BOUGHT JUST DIDN'T LAST AND YOU DIDN'T HAVE MONEY TO GET MORE.: NEVER TRUE

## 2025-02-13 SDOH — ECONOMIC STABILITY: FOOD INSECURITY: WITHIN THE PAST 12 MONTHS, YOU WORRIED THAT YOUR FOOD WOULD RUN OUT BEFORE YOU GOT MONEY TO BUY MORE.: NEVER TRUE

## 2025-02-13 ASSESSMENT — ENCOUNTER SYMPTOMS
EYE DISCHARGE: 0
NAUSEA: 0
DIARRHEA: 0
VOMITING: 0
TROUBLE SWALLOWING: 0
SHORTNESS OF BREATH: 0
COUGH: 0
EYE ITCHING: 0
SORE THROAT: 0
ABDOMINAL PAIN: 0

## 2025-02-13 ASSESSMENT — PATIENT HEALTH QUESTIONNAIRE - PHQ9
4. FEELING TIRED OR HAVING LITTLE ENERGY: NOT AT ALL
6. FEELING BAD ABOUT YOURSELF - OR THAT YOU ARE A FAILURE OR HAVE LET YOURSELF OR YOUR FAMILY DOWN: NOT AT ALL
3. TROUBLE FALLING OR STAYING ASLEEP: NOT AT ALL
9. THOUGHTS THAT YOU WOULD BE BETTER OFF DEAD, OR OF HURTING YOURSELF: NOT AT ALL
1. LITTLE INTEREST OR PLEASURE IN DOING THINGS: NOT AT ALL
7. TROUBLE CONCENTRATING ON THINGS, SUCH AS READING THE NEWSPAPER OR WATCHING TELEVISION: NOT AT ALL
2. FEELING DOWN, DEPRESSED OR HOPELESS: NOT AT ALL
SUM OF ALL RESPONSES TO PHQ QUESTIONS 1-9: 0
5. POOR APPETITE OR OVEREATING: NOT AT ALL
SUM OF ALL RESPONSES TO PHQ QUESTIONS 1-9: 0
SUM OF ALL RESPONSES TO PHQ QUESTIONS 1-9: 0
8. MOVING OR SPEAKING SO SLOWLY THAT OTHER PEOPLE COULD HAVE NOTICED. OR THE OPPOSITE, BEING SO FIGETY OR RESTLESS THAT YOU HAVE BEEN MOVING AROUND A LOT MORE THAN USUAL: NOT AT ALL
10. IF YOU CHECKED OFF ANY PROBLEMS, HOW DIFFICULT HAVE THESE PROBLEMS MADE IT FOR YOU TO DO YOUR WORK, TAKE CARE OF THINGS AT HOME, OR GET ALONG WITH OTHER PEOPLE: NOT DIFFICULT AT ALL
SUM OF ALL RESPONSES TO PHQ QUESTIONS 1-9: 0
SUM OF ALL RESPONSES TO PHQ9 QUESTIONS 1 & 2: 0

## 2025-02-13 NOTE — PROGRESS NOTES
Chidi Dobbs (:  1975) is a 49 y.o. male,Established patient, here for evaluation of the following chief complaint(s):  ADHD      ASSESSMENT/PLAN:  1. Attention deficit hyperactivity disorder (ADHD), unspecified ADHD type  2. Leukopenia, unspecified type  Assessment & Plan:   Monitored by specialist- no acute findings meriting change in the plan  3. Primary hypertension  Assessment & Plan:   Chronic, at goal (stable), continue current treatment plan      More than 30 minutes of the visit, including face to face, prechart and post chart was spent on counseling or coordination of care or reviewing reviewing chart          Return in about 3 months (around 2025), or if symptoms worsen or fail to improve, for PHYSICAL/ADHD.    SUBJECTIVE/OBJECTIVE:  HPI    Leukopenia  Family history of genetic disorder  -Patient following with Dr. Pham at [facility]  -scans were negative for any acute finding  -Patient is getting scheduled for a genetic test to rule out his susceptibility gene, patient not sure of the name of the gene    Hypertension  -Stable on hydrochlorothiazide    This is a chronic problem. The problem is well controlled.  Patient monitors readings regularly. Pertinent negatives include no chest pain, focal sensory loss, focal weakness, leg pain, myalgias or shortness of breath. No headaches or chest pain. Takes medications regularly.  Blood pressure has been stable, blood work was reviewed, and advised patient to continue the current instructions or medications.     Hypergonadism  Low testosterone  -Follows with Endo  -currently on anastrozole and testosterone    Patient is compliant w medications, no side effects, effective, provides adequate symptom relief. No new symptoms or problems as noted by patient.  The problem is stable, no changes noted by patient. Will consider monitoring labs and refill medications as appropriate. Patient counseled and will continue current plan.        ADHD  - adderall 20

## 2025-02-14 DIAGNOSIS — M25.561 RIGHT KNEE PAIN, UNSPECIFIED CHRONICITY: ICD-10-CM

## 2025-02-14 DIAGNOSIS — M25.551 RIGHT HIP PAIN: ICD-10-CM

## 2025-02-14 DIAGNOSIS — M79.641 BILATERAL HAND PAIN: ICD-10-CM

## 2025-02-14 DIAGNOSIS — M79.642 BILATERAL HAND PAIN: ICD-10-CM

## 2025-02-14 RX ORDER — CYCLOBENZAPRINE HCL 5 MG
5 TABLET ORAL 3 TIMES DAILY PRN
Qty: 30 TABLET | Refills: 5 | Status: SHIPPED | OUTPATIENT
Start: 2025-02-14

## 2025-02-14 NOTE — TELEPHONE ENCOUNTER
Medication:   Requested Prescriptions     Pending Prescriptions Disp Refills    cyclobenzaprine (FLEXERIL) 5 MG tablet [Pharmacy Med Name: CYCLOBENZAPRINE 5MG TABLETS] 30 tablet 5     Sig: TAKE 1 TABLET BY MOUTH THREE TIMES DAILY AS NEEDED FOR MUSCLE SPASMS     Last Filled:  6.14.24    Last appt: 2/13/2025   Next appt: Visit date not found    Last OARRS:       2/13/2025    10:28 AM   RX Monitoring   Periodic Controlled Substance Monitoring No signs of potential drug abuse or diversion identified.

## 2025-02-28 ENCOUNTER — TELEPHONE (OUTPATIENT)
Dept: ADMINISTRATIVE | Age: 50
End: 2025-02-28

## 2025-02-28 NOTE — TELEPHONE ENCOUNTER
Submitted RENEWAL PA for Testosterone Via [facility] Key: [id number] STATUS: PENDING.    Follow up done daily; if no decision with in three days we will refax.  If another three days goes by with no decision will call the insurance for status.

## 2025-03-17 DIAGNOSIS — M79.642 BILATERAL HAND PAIN: ICD-10-CM

## 2025-03-17 DIAGNOSIS — M25.561 RIGHT KNEE PAIN, UNSPECIFIED CHRONICITY: ICD-10-CM

## 2025-03-17 DIAGNOSIS — F90.2 ATTENTION DEFICIT HYPERACTIVITY DISORDER (ADHD), COMBINED TYPE: ICD-10-CM

## 2025-03-17 DIAGNOSIS — M25.551 RIGHT HIP PAIN: ICD-10-CM

## 2025-03-17 DIAGNOSIS — M79.641 BILATERAL HAND PAIN: ICD-10-CM

## 2025-03-17 RX ORDER — DEXTROAMPHETAMINE SACCHARATE, AMPHETAMINE ASPARTATE, DEXTROAMPHETAMINE SULFATE AND AMPHETAMINE SULFATE 5; 5; 5; 5 MG/1; MG/1; MG/1; MG/1
20 TABLET ORAL DAILY
Qty: 30 TABLET | Refills: 0 | Status: SHIPPED | OUTPATIENT
Start: 2025-03-17 | End: 2025-04-16

## 2025-03-17 RX ORDER — MELOXICAM 7.5 MG/1
7.5 TABLET ORAL 2 TIMES DAILY PRN
Qty: 60 TABLET | Refills: 1 | Status: SHIPPED | OUTPATIENT
Start: 2025-03-17

## 2025-03-17 RX ORDER — DEXTROAMPHETAMINE SACCHARATE, AMPHETAMINE ASPARTATE MONOHYDRATE, DEXTROAMPHETAMINE SULFATE AND AMPHETAMINE SULFATE 7.5; 7.5; 7.5; 7.5 MG/1; MG/1; MG/1; MG/1
30 CAPSULE, EXTENDED RELEASE ORAL DAILY
Qty: 30 CAPSULE | Refills: 0 | Status: SHIPPED | OUTPATIENT
Start: 2025-03-17 | End: 2025-04-16

## 2025-03-17 NOTE — TELEPHONE ENCOUNTER
Requested Prescriptions     Pending Prescriptions Disp Refills    amphetamine-dextroamphetamine (ADDERALL, 20MG,) 20 MG tablet 30 tablet 0     Sig: Take 1 tablet by mouth daily for 30 days. Max Daily Amount: 20 mg    amphetamine-dextroamphetamine (ADDERALL XR) 30 MG extended release capsule 30 capsule 0     Sig: Take 1 capsule by mouth daily for 30 days. Max Daily Amount: 30 mg     Last OV - 2/13/25  Next OV - none  Last refill - 2/12/25  Last labs - 1/29/25

## 2025-04-14 DIAGNOSIS — M25.551 RIGHT HIP PAIN: ICD-10-CM

## 2025-04-14 DIAGNOSIS — M25.561 RIGHT KNEE PAIN, UNSPECIFIED CHRONICITY: ICD-10-CM

## 2025-04-14 DIAGNOSIS — M79.641 BILATERAL HAND PAIN: ICD-10-CM

## 2025-04-14 DIAGNOSIS — M79.642 BILATERAL HAND PAIN: ICD-10-CM

## 2025-04-14 RX ORDER — CYCLOBENZAPRINE HCL 5 MG
5 TABLET ORAL 3 TIMES DAILY PRN
Qty: 30 TABLET | Refills: 2 | Status: SHIPPED | OUTPATIENT
Start: 2025-04-14

## 2025-04-14 NOTE — TELEPHONE ENCOUNTER
Medication:   Requested Prescriptions     Pending Prescriptions Disp Refills    cyclobenzaprine (FLEXERIL) 5 MG tablet [Pharmacy Med Name: CYCLOBENZAPRINE 5MG TABLETS] 30 tablet 5     Sig: TAKE 1 TABLET BY MOUTH THREE TIMES DAILY AS NEEDED FOR MUSCLE SPASMS     Last Filled:  02/14/2025    Last appt: 2/13/2025   Next appt: Visit date not found    Last OARRS:       2/13/2025    10:28 AM   RX Monitoring   Periodic Controlled Substance Monitoring No signs of potential drug abuse or diversion identified.      I was present during the key portion of the procedure.

## 2025-04-15 DIAGNOSIS — F90.2 ATTENTION DEFICIT HYPERACTIVITY DISORDER (ADHD), COMBINED TYPE: ICD-10-CM

## 2025-04-15 NOTE — TELEPHONE ENCOUNTER
Medication:   Requested Prescriptions     Pending Prescriptions Disp Refills    amphetamine-dextroamphetamine (ADDERALL, 20MG,) 20 MG tablet 30 tablet 0     Sig: Take 1 tablet by mouth daily for 30 days. Max Daily Amount: 20 mg    amphetamine-dextroamphetamine (ADDERALL XR) 30 MG extended release capsule 30 capsule 0     Sig: Take 1 capsule by mouth daily for 30 days. Max Daily Amount: 30 mg     Last Filled:  3.17.25    Last appt: 2/13/2025   Next appt: Visit date not found    Last OARRS:       2/13/2025    10:28 AM   RX Monitoring   Periodic Controlled Substance Monitoring No signs of potential drug abuse or diversion identified.

## 2025-04-16 RX ORDER — DEXTROAMPHETAMINE SACCHARATE, AMPHETAMINE ASPARTATE, DEXTROAMPHETAMINE SULFATE AND AMPHETAMINE SULFATE 5; 5; 5; 5 MG/1; MG/1; MG/1; MG/1
20 TABLET ORAL DAILY
Qty: 30 TABLET | Refills: 0 | Status: SHIPPED | OUTPATIENT
Start: 2025-04-16 | End: 2025-05-16

## 2025-04-16 RX ORDER — DEXTROAMPHETAMINE SACCHARATE, AMPHETAMINE ASPARTATE MONOHYDRATE, DEXTROAMPHETAMINE SULFATE AND AMPHETAMINE SULFATE 7.5; 7.5; 7.5; 7.5 MG/1; MG/1; MG/1; MG/1
30 CAPSULE, EXTENDED RELEASE ORAL DAILY
Qty: 30 CAPSULE | Refills: 0 | Status: SHIPPED | OUTPATIENT
Start: 2025-04-16 | End: 2025-05-16

## 2025-05-14 DIAGNOSIS — M79.641 BILATERAL HAND PAIN: ICD-10-CM

## 2025-05-14 DIAGNOSIS — M25.561 RIGHT KNEE PAIN, UNSPECIFIED CHRONICITY: ICD-10-CM

## 2025-05-14 DIAGNOSIS — M25.551 RIGHT HIP PAIN: ICD-10-CM

## 2025-05-14 DIAGNOSIS — M79.642 BILATERAL HAND PAIN: ICD-10-CM

## 2025-05-14 RX ORDER — MELOXICAM 7.5 MG/1
7.5 TABLET ORAL 2 TIMES DAILY PRN
Qty: 60 TABLET | Refills: 1 | Status: SHIPPED | OUTPATIENT
Start: 2025-05-14

## 2025-05-14 NOTE — TELEPHONE ENCOUNTER
Medication:   Requested Prescriptions     Pending Prescriptions Disp Refills    meloxicam (MOBIC) 7.5 MG tablet [Pharmacy Med Name: MELOXICAM 7.5MG TABLETS] 60 tablet 1     Sig: TAKE 1 TABLET BY MOUTH TWICE DAILY AS NEEDED FOR PAIN     Last Filled:  3.17.25    Last appt: 2/13/2025   Next appt: Visit date not found    Last OARRS:       2/13/2025    10:28 AM   RX Monitoring   Periodic Controlled Substance Monitoring No signs of potential drug abuse or diversion identified.

## 2025-05-15 DIAGNOSIS — F90.2 ATTENTION DEFICIT HYPERACTIVITY DISORDER (ADHD), COMBINED TYPE: ICD-10-CM

## 2025-05-16 DIAGNOSIS — I10 HYPERTENSION, UNSPECIFIED TYPE: ICD-10-CM

## 2025-05-16 RX ORDER — DEXTROAMPHETAMINE SACCHARATE, AMPHETAMINE ASPARTATE, DEXTROAMPHETAMINE SULFATE AND AMPHETAMINE SULFATE 5; 5; 5; 5 MG/1; MG/1; MG/1; MG/1
20 TABLET ORAL DAILY
Qty: 30 TABLET | Refills: 0 | Status: SHIPPED | OUTPATIENT
Start: 2025-05-16 | End: 2025-06-15

## 2025-05-16 RX ORDER — DEXTROAMPHETAMINE SACCHARATE, AMPHETAMINE ASPARTATE MONOHYDRATE, DEXTROAMPHETAMINE SULFATE AND AMPHETAMINE SULFATE 7.5; 7.5; 7.5; 7.5 MG/1; MG/1; MG/1; MG/1
30 CAPSULE, EXTENDED RELEASE ORAL DAILY
Qty: 30 CAPSULE | Refills: 0 | Status: SHIPPED | OUTPATIENT
Start: 2025-05-16 | End: 2025-06-15

## 2025-05-16 RX ORDER — HYDROCHLOROTHIAZIDE 25 MG/1
25 TABLET ORAL EVERY MORNING
Qty: 90 TABLET | Refills: 1 | Status: SHIPPED | OUTPATIENT
Start: 2025-05-16

## 2025-05-16 NOTE — TELEPHONE ENCOUNTER
Medication:   Requested Prescriptions     Pending Prescriptions Disp Refills    hydroCHLOROthiazide (HYDRODIURIL) 25 MG tablet [Pharmacy Med Name: HYDROCHLOROTHIAZIDE 25MG TABLETS] 90 tablet 1     Sig: TAKE 1 TABLET BY MOUTH EVERY MORNING     Last Filled:  11.14.24    Last appt: 2/13/2025   Next appt: Visit date not found    Last OARRS:       2/13/2025    10:28 AM   RX Monitoring   Periodic Controlled Substance Monitoring No signs of potential drug abuse or diversion identified.

## 2025-06-19 ENCOUNTER — OFFICE VISIT (OUTPATIENT)
Dept: PRIMARY CARE CLINIC | Age: 50
End: 2025-06-19
Payer: OTHER GOVERNMENT

## 2025-06-19 VITALS
RESPIRATION RATE: 13 BRPM | TEMPERATURE: 97.8 F | OXYGEN SATURATION: 99 % | HEART RATE: 68 BPM | HEIGHT: 68 IN | BODY MASS INDEX: 27.28 KG/M2 | WEIGHT: 180 LBS | DIASTOLIC BLOOD PRESSURE: 88 MMHG | SYSTOLIC BLOOD PRESSURE: 130 MMHG

## 2025-06-19 DIAGNOSIS — E78.5 HYPERLIPIDEMIA, UNSPECIFIED HYPERLIPIDEMIA TYPE: ICD-10-CM

## 2025-06-19 DIAGNOSIS — Z00.00 ANNUAL PHYSICAL EXAM: Primary | ICD-10-CM

## 2025-06-19 DIAGNOSIS — F90.2 ATTENTION DEFICIT HYPERACTIVITY DISORDER (ADHD), COMBINED TYPE: ICD-10-CM

## 2025-06-19 DIAGNOSIS — N18.2 STAGE 2 CHRONIC KIDNEY DISEASE: ICD-10-CM

## 2025-06-19 DIAGNOSIS — Z00.00 ANNUAL PHYSICAL EXAM: ICD-10-CM

## 2025-06-19 DIAGNOSIS — I10 PRIMARY HYPERTENSION: ICD-10-CM

## 2025-06-19 LAB
CHOLEST SERPL-MCNC: 253 MG/DL (ref 0–199)
EST. AVERAGE GLUCOSE BLD GHB EST-MCNC: 85.3 MG/DL
HBA1C MFR BLD: 4.6 %
HDLC SERPL-MCNC: 48 MG/DL (ref 40–60)
LDLC SERPL CALC-MCNC: 161 MG/DL
PSA SERPL DL<=0.01 NG/ML-MCNC: 1 NG/ML (ref 0–4)
TRIGL SERPL-MCNC: 222 MG/DL (ref 0–150)
TSH SERPL DL<=0.005 MIU/L-ACNC: 2.28 UIU/ML (ref 0.27–4.2)
VLDLC SERPL CALC-MCNC: 44 MG/DL

## 2025-06-19 PROCEDURE — 99396 PREV VISIT EST AGE 40-64: CPT | Performed by: FAMILY MEDICINE

## 2025-06-19 PROCEDURE — 3075F SYST BP GE 130 - 139MM HG: CPT | Performed by: FAMILY MEDICINE

## 2025-06-19 PROCEDURE — 3079F DIAST BP 80-89 MM HG: CPT | Performed by: FAMILY MEDICINE

## 2025-06-19 RX ORDER — DEXTROAMPHETAMINE SACCHARATE, AMPHETAMINE ASPARTATE, DEXTROAMPHETAMINE SULFATE AND AMPHETAMINE SULFATE 5; 5; 5; 5 MG/1; MG/1; MG/1; MG/1
20 TABLET ORAL DAILY
Qty: 30 TABLET | Refills: 0 | Status: SHIPPED | OUTPATIENT
Start: 2025-06-19 | End: 2025-07-19

## 2025-06-19 RX ORDER — DEXTROAMPHETAMINE SACCHARATE, AMPHETAMINE ASPARTATE MONOHYDRATE, DEXTROAMPHETAMINE SULFATE AND AMPHETAMINE SULFATE 7.5; 7.5; 7.5; 7.5 MG/1; MG/1; MG/1; MG/1
30 CAPSULE, EXTENDED RELEASE ORAL DAILY
Qty: 30 CAPSULE | Refills: 0 | Status: SHIPPED | OUTPATIENT
Start: 2025-06-19 | End: 2025-07-19

## 2025-06-19 ASSESSMENT — ENCOUNTER SYMPTOMS
COUGH: 0
VOMITING: 0
ABDOMINAL PAIN: 0
SORE THROAT: 0
TROUBLE SWALLOWING: 0
NAUSEA: 0
EYE DISCHARGE: 0
SHORTNESS OF BREATH: 0
EYE ITCHING: 0
DIARRHEA: 0

## 2025-06-19 NOTE — PROGRESS NOTES
2025    Chidi Dobbs (:  1975) is a 50 y.o. male, here for a preventive medicine evaluation.    Subjective   Patient Active Problem List   Diagnosis    PTSD (post-traumatic stress disorder)    Anxiety    Attention deficit hyperactivity disorder (ADHD)    Adjustment disorder with anxiety    Allergic rhinitis    Astigmatism    Cervicalgia    Decreased libido    Esophageal reflux    Gastroesophageal reflux disease with esophagitis    Herniated lumbar intervertebral disc    Hyperlipidemia    Hypertension    Primary insomnia    Low back pain    Myopia, bilateral    Nephrolithiasis    Obstructive sleep apnea syndrome    Sciatica    Stage 2 chronic kidney disease    Thrombosed external hemorrhoids    Regular astigmatism, bilateral    Abnormal EKG    Family history of heart disease    Peripheral retinal degeneration of both eyes    History of hemorrhoidectomy    Hemorrhoids    Decreased testosterone level    Erectile dysfunction    Sleep disorder    Hypogonadism in male    Leukopenia    Iron deficiency anemia       Annual Physical  - labs      Leukopenia  Family history of genetic disorder  -Patient following with Dr. Pham at [facility]  -scans were negative for any acute finding  -Patient is getting scheduled for a genetic test to rule out his susceptibility gene, patient not sure of the name of the gene     Hypertension  -Stable on hydrochlorothiazide        Hypergonadism  Low testosterone  -Follows with Endo  -currently on anastrozole and testosterone       HLD    The 10-year ASCVD risk score (Johanny CHRISTOPHER, et al., 2019) is: 4.4%    Values used to calculate the score:      Age: 50 years      Sex: Male      Is Non- : No      Diabetic: No      Tobacco smoker: No      Systolic Blood Pressure: 130 mmHg      Is BP treated: Yes      HDL Cholesterol: 53 mg/dL      Total Cholesterol: 218 mg/dL         ADHD  - adderall 20 mg daily - afternoon 2:30 pm  and 30 mg XR  - morning 5:45 am  -

## 2025-06-19 NOTE — ASSESSMENT & PLAN NOTE
Orders:    amphetamine-dextroamphetamine (ADDERALL, 20MG,) 20 MG tablet; Take 1 tablet by mouth daily for 30 days. Max Daily Amount: 20 mg    amphetamine-dextroamphetamine (ADDERALL XR) 30 MG extended release capsule; Take 1 capsule by mouth daily for 30 days. Max Daily Amount: 30 mg

## 2025-06-20 ENCOUNTER — RESULTS FOLLOW-UP (OUTPATIENT)
Dept: PRIMARY CARE CLINIC | Age: 50
End: 2025-06-20

## 2025-07-16 DIAGNOSIS — M79.641 BILATERAL HAND PAIN: ICD-10-CM

## 2025-07-16 DIAGNOSIS — M25.561 RIGHT KNEE PAIN, UNSPECIFIED CHRONICITY: ICD-10-CM

## 2025-07-16 DIAGNOSIS — M25.551 RIGHT HIP PAIN: ICD-10-CM

## 2025-07-16 DIAGNOSIS — M79.642 BILATERAL HAND PAIN: ICD-10-CM

## 2025-07-16 RX ORDER — MELOXICAM 7.5 MG/1
7.5 TABLET ORAL 2 TIMES DAILY PRN
Qty: 60 TABLET | Refills: 1 | Status: SHIPPED | OUTPATIENT
Start: 2025-07-16

## 2025-07-16 NOTE — TELEPHONE ENCOUNTER
Medication:   Requested Prescriptions     Pending Prescriptions Disp Refills    meloxicam (MOBIC) 7.5 MG tablet [Pharmacy Med Name: MELOXICAM 7.5MG TABLETS] 60 tablet 1     Sig: TAKE 1 TABLET BY MOUTH TWICE DAILY AS NEEDED FOR PAIN     Last Filled:  5/14/2025    Last appt: 6/19/2025   Next appt: 9/11/2025  Last OARRS:       6/19/2025    11:40 AM   RX Monitoring   Periodic Controlled Substance Monitoring No signs of potential drug abuse or diversion identified.

## 2025-07-16 NOTE — TELEPHONE ENCOUNTER
Pt called back in and he is scheduled for a follow up on 9/11.    His las visit was a physical with Dr. Schwartz was on 6/19.

## 2025-07-19 DIAGNOSIS — F90.2 ATTENTION DEFICIT HYPERACTIVITY DISORDER (ADHD), COMBINED TYPE: ICD-10-CM

## 2025-07-21 RX ORDER — DEXTROAMPHETAMINE SACCHARATE, AMPHETAMINE ASPARTATE, DEXTROAMPHETAMINE SULFATE AND AMPHETAMINE SULFATE 5; 5; 5; 5 MG/1; MG/1; MG/1; MG/1
20 TABLET ORAL DAILY
Qty: 30 TABLET | Refills: 0 | Status: SHIPPED | OUTPATIENT
Start: 2025-07-21 | End: 2025-08-20

## 2025-07-21 RX ORDER — DEXTROAMPHETAMINE SACCHARATE, AMPHETAMINE ASPARTATE MONOHYDRATE, DEXTROAMPHETAMINE SULFATE AND AMPHETAMINE SULFATE 7.5; 7.5; 7.5; 7.5 MG/1; MG/1; MG/1; MG/1
30 CAPSULE, EXTENDED RELEASE ORAL DAILY
Qty: 30 CAPSULE | Refills: 0 | Status: SHIPPED | OUTPATIENT
Start: 2025-07-21 | End: 2025-08-20

## 2025-07-21 NOTE — TELEPHONE ENCOUNTER
Medication:   Requested Prescriptions     Pending Prescriptions Disp Refills    amphetamine-dextroamphetamine (ADDERALL, 20MG,) 20 MG tablet 30 tablet 0     Sig: Take 1 tablet by mouth daily for 30 days. Max Daily Amount: 20 mg    amphetamine-dextroamphetamine (ADDERALL XR) 30 MG extended release capsule 30 capsule 0     Sig: Take 1 capsule by mouth daily for 30 days. Max Daily Amount: 30 mg     Last Filled:  06/19/2025, 06/19/2025    Last appt: 6/19/2025   Next appt: 9/11/2025    Last OARRS:       6/19/2025    11:40 AM   RX Monitoring   Periodic Controlled Substance Monitoring No signs of potential drug abuse or diversion identified.

## 2025-07-26 DIAGNOSIS — K21.9 GASTROESOPHAGEAL REFLUX DISEASE, UNSPECIFIED WHETHER ESOPHAGITIS PRESENT: ICD-10-CM

## 2025-07-29 RX ORDER — OMEPRAZOLE 40 MG/1
40 CAPSULE, DELAYED RELEASE ORAL DAILY
Qty: 90 CAPSULE | Refills: 1 | Status: SHIPPED | OUTPATIENT
Start: 2025-07-29

## 2025-07-29 NOTE — TELEPHONE ENCOUNTER
Medication:   Requested Prescriptions     Pending Prescriptions Disp Refills    omeprazole (PRILOSEC) 40 MG delayed release capsule [Pharmacy Med Name: OMEPRAZOLE 40MG CAPSULES] 90 capsule 1     Sig: TAKE 1 CAPSULE BY MOUTH DAILY     Last filled: 1/27/25  Last appt: 6/19/2025   Next appt: 9/11/2025    Last OARRS:       6/19/2025    11:40 AM   RX Monitoring   Periodic Controlled Substance Monitoring No signs of potential drug abuse or diversion identified.

## 2025-08-08 ENCOUNTER — TELEPHONE (OUTPATIENT)
Dept: PRIMARY CARE CLINIC | Age: 50
End: 2025-08-08

## 2025-08-13 ENCOUNTER — OFFICE VISIT (OUTPATIENT)
Dept: PRIMARY CARE CLINIC | Age: 50
End: 2025-08-13
Payer: OTHER GOVERNMENT

## 2025-08-13 VITALS
HEART RATE: 97 BPM | TEMPERATURE: 97.6 F | BODY MASS INDEX: 25.55 KG/M2 | DIASTOLIC BLOOD PRESSURE: 92 MMHG | SYSTOLIC BLOOD PRESSURE: 138 MMHG | WEIGHT: 168.6 LBS | OXYGEN SATURATION: 99 % | HEIGHT: 68 IN

## 2025-08-13 DIAGNOSIS — Z98.890 HISTORY OF HEMORRHOIDECTOMY: ICD-10-CM

## 2025-08-13 DIAGNOSIS — K64.4 EXTERNAL HEMORRHOID: Primary | ICD-10-CM

## 2025-08-13 DIAGNOSIS — F90.2 ATTENTION DEFICIT HYPERACTIVITY DISORDER (ADHD), COMBINED TYPE: ICD-10-CM

## 2025-08-13 PROCEDURE — 3080F DIAST BP >= 90 MM HG: CPT | Performed by: FAMILY MEDICINE

## 2025-08-13 PROCEDURE — 3075F SYST BP GE 130 - 139MM HG: CPT | Performed by: FAMILY MEDICINE

## 2025-08-13 PROCEDURE — 99214 OFFICE O/P EST MOD 30 MIN: CPT | Performed by: FAMILY MEDICINE

## 2025-08-13 RX ORDER — DEXTROAMPHETAMINE SACCHARATE, AMPHETAMINE ASPARTATE, DEXTROAMPHETAMINE SULFATE AND AMPHETAMINE SULFATE 5; 5; 5; 5 MG/1; MG/1; MG/1; MG/1
20 TABLET ORAL DAILY
Qty: 30 TABLET | Refills: 0 | Status: SHIPPED | OUTPATIENT
Start: 2025-09-19 | End: 2025-10-19

## 2025-08-13 RX ORDER — BETAMETHASONE DIPROPIONATE 0.05 %
OINTMENT (GRAM) TOPICAL
Qty: 50 G | Refills: 3 | Status: SHIPPED | OUTPATIENT
Start: 2025-08-13

## 2025-08-13 RX ORDER — DEXTROAMPHETAMINE SACCHARATE, AMPHETAMINE ASPARTATE MONOHYDRATE, DEXTROAMPHETAMINE SULFATE AND AMPHETAMINE SULFATE 7.5; 7.5; 7.5; 7.5 MG/1; MG/1; MG/1; MG/1
30 CAPSULE, EXTENDED RELEASE ORAL DAILY
Qty: 30 CAPSULE | Refills: 0 | Status: SHIPPED | OUTPATIENT
Start: 2025-09-19 | End: 2025-10-19

## 2025-08-13 ASSESSMENT — ENCOUNTER SYMPTOMS
SORE THROAT: 0
SHORTNESS OF BREATH: 0
NAUSEA: 0
DIARRHEA: 0
COUGH: 0
EYE DISCHARGE: 0
TROUBLE SWALLOWING: 0
ABDOMINAL PAIN: 0
EYE ITCHING: 0
VOMITING: 0

## 2025-08-19 ENCOUNTER — PATIENT MESSAGE (OUTPATIENT)
Dept: PRIMARY CARE CLINIC | Age: 50
End: 2025-08-19

## 2025-08-19 DIAGNOSIS — F90.2 ATTENTION DEFICIT HYPERACTIVITY DISORDER (ADHD), COMBINED TYPE: ICD-10-CM

## 2025-08-19 RX ORDER — DEXTROAMPHETAMINE SACCHARATE, AMPHETAMINE ASPARTATE MONOHYDRATE, DEXTROAMPHETAMINE SULFATE AND AMPHETAMINE SULFATE 7.5; 7.5; 7.5; 7.5 MG/1; MG/1; MG/1; MG/1
30 CAPSULE, EXTENDED RELEASE ORAL DAILY
Qty: 30 CAPSULE | Refills: 0 | Status: SHIPPED | OUTPATIENT
Start: 2025-08-19 | End: 2025-09-18

## 2025-08-19 RX ORDER — DEXTROAMPHETAMINE SACCHARATE, AMPHETAMINE ASPARTATE, DEXTROAMPHETAMINE SULFATE AND AMPHETAMINE SULFATE 5; 5; 5; 5 MG/1; MG/1; MG/1; MG/1
20 TABLET ORAL DAILY
Qty: 30 TABLET | Refills: 0 | Status: SHIPPED | OUTPATIENT
Start: 2025-08-19 | End: 2025-09-18

## 2025-08-22 DIAGNOSIS — M79.641 BILATERAL HAND PAIN: ICD-10-CM

## 2025-08-22 DIAGNOSIS — M25.551 RIGHT HIP PAIN: ICD-10-CM

## 2025-08-22 DIAGNOSIS — M79.642 BILATERAL HAND PAIN: ICD-10-CM

## 2025-08-22 DIAGNOSIS — M25.561 RIGHT KNEE PAIN, UNSPECIFIED CHRONICITY: ICD-10-CM

## 2025-08-22 DIAGNOSIS — I10 HYPERTENSION, UNSPECIFIED TYPE: ICD-10-CM

## 2025-08-22 RX ORDER — HYDROCHLOROTHIAZIDE 25 MG/1
25 TABLET ORAL EVERY MORNING
Qty: 90 TABLET | Refills: 0 | Status: SHIPPED | OUTPATIENT
Start: 2025-08-22

## 2025-08-22 RX ORDER — MELOXICAM 7.5 MG/1
7.5 TABLET ORAL 2 TIMES DAILY PRN
Qty: 60 TABLET | Refills: 0 | Status: SHIPPED | OUTPATIENT
Start: 2025-08-22

## (undated) DEVICE — CONMED SCOPE SAVER BITE BLOCK, 20X27 MM: Brand: SCOPE SAVER

## (undated) DEVICE — SNARE ENDOSCP L240CM SHTH DIA24MM LOOP W10MM POLYP RND REINF

## (undated) DEVICE — TRAP SPEC POLYPR SGL CHMBR FN MESH SCRN

## (undated) DEVICE — FORCEPS BX L240CM JAW DIA2.8MM L CAP W/ NDL MIC MESH TOOTH

## (undated) DEVICE — ENDOSCOPIC KIT 2 12 FT OP4 DE2 GWN SYR

## (undated) DEVICE — CANNULA NSL AD TBNG L7FT PVC STR NONFLARED PRNG O2 DEL W STD

## (undated) DEVICE — ELECTRODE,ECG,STRESS,FOAM,3PK: Brand: MEDLINE